# Patient Record
Sex: FEMALE | Race: BLACK OR AFRICAN AMERICAN | Employment: FULL TIME | URBAN - METROPOLITAN AREA
[De-identification: names, ages, dates, MRNs, and addresses within clinical notes are randomized per-mention and may not be internally consistent; named-entity substitution may affect disease eponyms.]

---

## 2017-01-01 ENCOUNTER — HOSPITAL ENCOUNTER (OUTPATIENT)
Dept: OTHER | Age: 47
Discharge: HOME | End: 2017-01-31
Attending: NEUROLOGICAL SURGERY | Admitting: NEUROLOGICAL SURGERY

## 2017-02-28 ENCOUNTER — OFFICE VISIT (OUTPATIENT)
Dept: FAMILY MEDICINE CLINIC | Age: 47
End: 2017-02-28

## 2017-02-28 VITALS
BODY MASS INDEX: 36.73 KG/M2 | SYSTOLIC BLOOD PRESSURE: 108 MMHG | HEART RATE: 74 BPM | WEIGHT: 248 LBS | HEIGHT: 69 IN | DIASTOLIC BLOOD PRESSURE: 64 MMHG

## 2017-02-28 DIAGNOSIS — J45.20 MILD INTERMITTENT ASTHMA WITHOUT COMPLICATION: ICD-10-CM

## 2017-02-28 DIAGNOSIS — R73.02 IMPAIRED GLUCOSE TOLERANCE: Primary | ICD-10-CM

## 2017-02-28 DIAGNOSIS — E66.01 SEVERE OBESITY (BMI 35.0-39.9): ICD-10-CM

## 2017-02-28 DIAGNOSIS — I10 ESSENTIAL HYPERTENSION: ICD-10-CM

## 2017-02-28 DIAGNOSIS — M79.672 LEFT FOOT PAIN: ICD-10-CM

## 2017-02-28 DIAGNOSIS — E78.49 OTHER HYPERLIPIDEMIA: ICD-10-CM

## 2017-02-28 LAB
A/G RATIO: 1.5 (ref 1.1–2.2)
ALBUMIN SERPL-MCNC: 4.6 G/DL (ref 3.4–5)
ALP BLD-CCNC: 66 U/L (ref 40–129)
ALT SERPL-CCNC: 18 U/L (ref 10–40)
ANION GAP SERPL CALCULATED.3IONS-SCNC: 17 MMOL/L (ref 3–16)
AST SERPL-CCNC: 17 U/L (ref 15–37)
BILIRUB SERPL-MCNC: 0.9 MG/DL (ref 0–1)
BUN BLDV-MCNC: 10 MG/DL (ref 7–20)
CALCIUM SERPL-MCNC: 9.8 MG/DL (ref 8.3–10.6)
CHLORIDE BLD-SCNC: 95 MMOL/L (ref 99–110)
CHOLESTEROL, TOTAL: 260 MG/DL (ref 0–199)
CO2: 24 MMOL/L (ref 21–32)
CREAT SERPL-MCNC: 0.6 MG/DL (ref 0.6–1.1)
GFR AFRICAN AMERICAN: >60
GFR NON-AFRICAN AMERICAN: >60
GLOBULIN: 3 G/DL
GLUCOSE BLD-MCNC: 102 MG/DL (ref 70–99)
HDLC SERPL-MCNC: 59 MG/DL (ref 40–60)
LDL CHOLESTEROL CALCULATED: 178 MG/DL
POTASSIUM SERPL-SCNC: 4.1 MMOL/L (ref 3.5–5.1)
SODIUM BLD-SCNC: 136 MMOL/L (ref 136–145)
TOTAL PROTEIN: 7.6 G/DL (ref 6.4–8.2)
TRIGL SERPL-MCNC: 114 MG/DL (ref 0–150)
VLDLC SERPL CALC-MCNC: 23 MG/DL

## 2017-02-28 PROCEDURE — 36415 COLL VENOUS BLD VENIPUNCTURE: CPT | Performed by: FAMILY MEDICINE

## 2017-02-28 PROCEDURE — 99214 OFFICE O/P EST MOD 30 MIN: CPT | Performed by: FAMILY MEDICINE

## 2017-02-28 RX ORDER — ATORVASTATIN CALCIUM 80 MG/1
80 TABLET, FILM COATED ORAL DAILY
Qty: 30 TABLET | Refills: 11 | Status: SHIPPED | OUTPATIENT
Start: 2017-02-28 | End: 2018-03-30 | Stop reason: SDUPTHER

## 2017-02-28 RX ORDER — METOPROLOL SUCCINATE 100 MG/1
100 TABLET, EXTENDED RELEASE ORAL DAILY
Qty: 30 TABLET | Refills: 5 | Status: SHIPPED | OUTPATIENT
Start: 2017-02-28 | End: 2018-04-25 | Stop reason: SDUPTHER

## 2017-02-28 RX ORDER — TRIAMTERENE AND HYDROCHLOROTHIAZIDE 37.5; 25 MG/1; MG/1
1 CAPSULE ORAL EVERY MORNING
Qty: 30 CAPSULE | Refills: 11 | Status: SHIPPED | OUTPATIENT
Start: 2017-02-28 | End: 2018-03-30 | Stop reason: SDUPTHER

## 2017-02-28 RX ORDER — GABAPENTIN 300 MG/1
300 CAPSULE ORAL NIGHTLY
Qty: 30 CAPSULE | Refills: 5 | Status: SHIPPED | OUTPATIENT
Start: 2017-02-28 | End: 2018-01-31

## 2017-02-28 RX ORDER — ALBUTEROL SULFATE 90 UG/1
2 AEROSOL, METERED RESPIRATORY (INHALATION) EVERY 6 HOURS PRN
Qty: 1 INHALER | Refills: 1 | Status: SHIPPED | OUTPATIENT
Start: 2017-02-28 | End: 2018-03-30 | Stop reason: SDUPTHER

## 2017-02-28 RX ORDER — SUMATRIPTAN 100 MG/1
1 TABLET, FILM COATED ORAL PRN
Refills: 11 | COMMUNITY
Start: 2017-02-07 | End: 2017-02-28

## 2017-02-28 ASSESSMENT — ENCOUNTER SYMPTOMS
CHEST TIGHTNESS: 0
SHORTNESS OF BREATH: 0

## 2017-03-01 LAB
ESTIMATED AVERAGE GLUCOSE: 125.5 MG/DL
HBA1C MFR BLD: 6 %

## 2017-03-06 ENCOUNTER — TELEPHONE (OUTPATIENT)
Dept: FAMILY MEDICINE CLINIC | Age: 47
End: 2017-03-06

## 2017-03-06 DIAGNOSIS — R73.02 IMPAIRED GLUCOSE TOLERANCE: Primary | ICD-10-CM

## 2017-05-10 ENCOUNTER — OFFICE VISIT (OUTPATIENT)
Dept: FAMILY MEDICINE CLINIC | Age: 47
End: 2017-05-10

## 2017-05-10 VITALS
WEIGHT: 252.2 LBS | HEART RATE: 78 BPM | DIASTOLIC BLOOD PRESSURE: 82 MMHG | BODY MASS INDEX: 37.36 KG/M2 | SYSTOLIC BLOOD PRESSURE: 118 MMHG | HEIGHT: 69 IN | TEMPERATURE: 98.5 F

## 2017-05-10 DIAGNOSIS — J06.9 VIRAL URI: Primary | ICD-10-CM

## 2017-05-10 PROCEDURE — 99213 OFFICE O/P EST LOW 20 MIN: CPT | Performed by: FAMILY MEDICINE

## 2017-05-10 RX ORDER — BENZONATATE 100 MG/1
200 CAPSULE ORAL 3 TIMES DAILY PRN
Qty: 30 CAPSULE | Refills: 0 | Status: SHIPPED | OUTPATIENT
Start: 2017-05-10 | End: 2017-09-05

## 2017-07-20 ENCOUNTER — OFFICE VISIT (OUTPATIENT)
Dept: FAMILY MEDICINE CLINIC | Age: 47
End: 2017-07-20

## 2017-07-20 VITALS
SYSTOLIC BLOOD PRESSURE: 108 MMHG | WEIGHT: 254.4 LBS | HEIGHT: 69 IN | BODY MASS INDEX: 37.68 KG/M2 | DIASTOLIC BLOOD PRESSURE: 62 MMHG | HEART RATE: 74 BPM

## 2017-07-20 DIAGNOSIS — N64.4 MASTODYNIA OF RIGHT BREAST: Primary | ICD-10-CM

## 2017-07-20 DIAGNOSIS — M77.11 RIGHT TENNIS ELBOW: ICD-10-CM

## 2017-07-20 DIAGNOSIS — Z85.3 HISTORY OF BREAST CANCER: ICD-10-CM

## 2017-07-20 PROCEDURE — 99213 OFFICE O/P EST LOW 20 MIN: CPT | Performed by: FAMILY MEDICINE

## 2017-08-15 ENCOUNTER — TELEPHONE (OUTPATIENT)
Dept: FAMILY MEDICINE CLINIC | Age: 47
End: 2017-08-15

## 2017-08-23 ENCOUNTER — HOSPITAL ENCOUNTER (OUTPATIENT)
Dept: WOMENS IMAGING | Age: 47
Discharge: HOME | End: 2017-08-23
Attending: FAMILY MEDICINE | Admitting: FAMILY MEDICINE

## 2017-08-23 DIAGNOSIS — N64.4 MASTODYNIA OF RIGHT BREAST: ICD-10-CM

## 2017-08-23 DIAGNOSIS — Z85.3 PERSONAL HISTORY OF MALIGNANT NEOPLASM OF BREAST: ICD-10-CM

## 2017-08-23 DIAGNOSIS — N64.4 BREAST PAIN, RIGHT: ICD-10-CM

## 2017-08-23 DIAGNOSIS — Z85.3 HISTORY OF BREAST CANCER: ICD-10-CM

## 2017-08-23 LAB
ALBUMIN SERPL-MCNC: 4.3 GM/DL (ref 3.4–5)
ALP BLD-CCNC: 80 IU/L (ref 40–128)
ALT SERPL-CCNC: 16 U/L (ref 10–40)
ANION GAP SERPL CALCULATED.3IONS-SCNC: 12 MMOL/L (ref 4–16)
AST SERPL-CCNC: 15 IU/L (ref 15–37)
BILIRUB SERPL-MCNC: 0.9 MG/DL (ref 0–1)
BUN BLDV-MCNC: 13 MG/DL (ref 6–23)
CALCIUM SERPL-MCNC: 9.5 MG/DL (ref 8.3–10.6)
CHLORIDE BLD-SCNC: 96 MMOL/L (ref 99–110)
CHOLESTEROL: 287 MG/DL
CO2: 30 MMOL/L (ref 21–32)
CREAT SERPL-MCNC: 0.8 MG/DL (ref 0.6–1.1)
ESTIMATED AVERAGE GLUCOSE: 117 MG/DL
GFR AFRICAN AMERICAN: >60 ML/MIN/1.73M2
GFR NON-AFRICAN AMERICAN: >60 ML/MIN/1.73M2
GLUCOSE FASTING: 92 MG/DL (ref 70–99)
HBA1C MFR BLD: 5.7 % (ref 4.2–6.3)
HDLC SERPL-MCNC: 50 MG/DL
LDL CHOLESTEROL DIRECT: 221 MG/DL
POTASSIUM SERPL-SCNC: 4 MMOL/L (ref 3.5–5.1)
SODIUM BLD-SCNC: 138 MMOL/L (ref 135–145)
TOTAL PROTEIN: 7.7 GM/DL (ref 6.4–8.2)
TRIGL SERPL-MCNC: 182 MG/DL

## 2017-08-30 ENCOUNTER — NURSE ONLY (OUTPATIENT)
Dept: FAMILY MEDICINE CLINIC | Age: 47
End: 2017-08-30

## 2017-08-30 DIAGNOSIS — Z23 NEED FOR INFLUENZA VACCINATION: Primary | ICD-10-CM

## 2017-08-30 PROCEDURE — 90471 IMMUNIZATION ADMIN: CPT | Performed by: FAMILY MEDICINE

## 2017-08-30 PROCEDURE — 90686 IIV4 VACC NO PRSV 0.5 ML IM: CPT | Performed by: FAMILY MEDICINE

## 2017-09-05 ENCOUNTER — OFFICE VISIT (OUTPATIENT)
Dept: FAMILY MEDICINE CLINIC | Age: 47
End: 2017-09-05

## 2017-09-05 VITALS
HEIGHT: 67 IN | RESPIRATION RATE: 14 BRPM | HEART RATE: 68 BPM | DIASTOLIC BLOOD PRESSURE: 72 MMHG | BODY MASS INDEX: 40.62 KG/M2 | WEIGHT: 258.8 LBS | SYSTOLIC BLOOD PRESSURE: 122 MMHG

## 2017-09-05 DIAGNOSIS — M77.11 RIGHT TENNIS ELBOW: ICD-10-CM

## 2017-09-05 DIAGNOSIS — E78.5 HYPERLIPIDEMIA, UNSPECIFIED HYPERLIPIDEMIA TYPE: ICD-10-CM

## 2017-09-05 DIAGNOSIS — G43.909 MIGRAINE WITHOUT STATUS MIGRAINOSUS, NOT INTRACTABLE, UNSPECIFIED MIGRAINE TYPE: ICD-10-CM

## 2017-09-05 DIAGNOSIS — I10 ESSENTIAL HYPERTENSION: ICD-10-CM

## 2017-09-05 DIAGNOSIS — Z00.00 ANNUAL PHYSICAL EXAM: Primary | ICD-10-CM

## 2017-09-05 DIAGNOSIS — J45.20 MILD INTERMITTENT ASTHMA WITHOUT COMPLICATION: ICD-10-CM

## 2017-09-05 DIAGNOSIS — F10.10 ALCOHOL ABUSE: ICD-10-CM

## 2017-09-05 DIAGNOSIS — R73.02 IMPAIRED GLUCOSE TOLERANCE: ICD-10-CM

## 2017-09-05 PROCEDURE — 99396 PREV VISIT EST AGE 40-64: CPT | Performed by: FAMILY MEDICINE

## 2017-09-05 PROCEDURE — 99214 OFFICE O/P EST MOD 30 MIN: CPT | Performed by: FAMILY MEDICINE

## 2017-09-05 RX ORDER — SUMATRIPTAN 100 MG/1
100 TABLET, FILM COATED ORAL
Qty: 9 TABLET | Refills: 11 | Status: SHIPPED | OUTPATIENT
Start: 2017-09-05 | End: 2018-04-25 | Stop reason: SDUPTHER

## 2017-10-12 ENCOUNTER — OFFICE VISIT (OUTPATIENT)
Dept: ORTHOPEDIC SURGERY | Age: 47
End: 2017-10-12

## 2017-10-12 VITALS — BODY MASS INDEX: 40.49 KG/M2 | RESPIRATION RATE: 16 BRPM | HEIGHT: 67 IN | WEIGHT: 258 LBS

## 2017-10-12 DIAGNOSIS — M77.11 LATERAL EPICONDYLITIS OF RIGHT ELBOW: ICD-10-CM

## 2017-10-12 DIAGNOSIS — R52 PAIN: Primary | ICD-10-CM

## 2017-10-12 PROCEDURE — 73080 X-RAY EXAM OF ELBOW: CPT | Performed by: ORTHOPAEDIC SURGERY

## 2017-10-12 PROCEDURE — 99244 OFF/OP CNSLTJ NEW/EST MOD 40: CPT | Performed by: ORTHOPAEDIC SURGERY

## 2017-10-12 PROCEDURE — 20551 NJX 1 TENDON ORIGIN/INSJ: CPT | Performed by: ORTHOPAEDIC SURGERY

## 2017-10-12 ASSESSMENT — ENCOUNTER SYMPTOMS
BACK PAIN: 1
COUGH: 1

## 2017-10-12 NOTE — LETTER
[facility]  [address]  [facility] [address]  Phone: [phone]  Fax: [phone]    Jessie Kaur MD        October 12, 2017     Patient: Neo Rahman   YOB: 1970   Date of Visit: 10/12/2017       To Whom it May Concern:    Emelyn Trevizo was seen in my clinic on 10/12/2017. If you have any questions or concerns, please don't hesitate to call.     Sincerely,           Jessie Kaur MD

## 2017-10-12 NOTE — PROGRESS NOTES
Review of Systems   Respiratory: Positive for cough. Musculoskeletal: Positive for back pain, myalgias and neck pain. Endo/Heme/Allergies: Positive for environmental allergies. All other systems reviewed and are negative. Scribe Authentication Statement  Sabina M[facility]dez [facility], scribed portions of this documentation for and in the presence of Dr. Baljinder Ellington on 10/12/17 at 4:02 PM.    Provider Ania Alvarez M.D., personally performed the services described in this documentation and they were scribed in my presence by the above listed scribe. The documentation is both accurate and complete. Ms. Osiel Cuevas c/o right elbow pain, pain is 3-4/10, lateral, aching. Dr. Benitez Joseph has treated her with tennis elbow brace, gabapentin, and inbuprofen. She is here today stating that the overall course is symptoms have progressed to a point and plateaued. 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 Referred by Dr. Benitez Joseph for right elbow pain    The patient's past medical history, medications, allergies,  family history, social history, and review of systems have been reviewed and are recorded in the chart. There are not any recent changes in their medical history. Physical Exam:  Vitals  Resp: 16  Height: 5' 7\" (170.2 cm)  Weight: 258 lb (117 kg)  Ms. Osiel Cuevas appears well, she is in no apparent distress, she demonstrates appropriate mood & affect. Gait is normal.    right Arm exam:  Skin: Skin color, texture, turgor normal. No rashes or lesions. Sensation is subjectively and objectively normal in the extremity. Vascular examination reveals 2+ radial.  Muscular strength is clinically appropriate bilaterally.     right elbow exam:  -Swelling is absent   -moderate tenderness to palpation of Her lateral epicondylar area, none medially, none posterio  -ROM is full range of motion  -there is no significant instability     taken and reviewed  3 views of the right elbow show no fracture, dislocation, swelling or degenerative changes noted      Impression:  right lateral epicondylitis    Plan:  Right elbow steroid injection complete today   NSAIDs as needed. Continue elbow strap as needed. Follow up as needed. Add Tylenol (also known as acetaminophen) as needed   Take 2 extra strength (500 mg) or 3 regular strength (325 mg) 3-4 times a day  It is OK to take Tylenol in conjunction with NSAID's (Advil, Aleve, Naprosyn, etc)  If taking other medications that have acetaminophen ensure total acetaminophen dose for any 24 period is less than 4,000 mg    The patient was advised that NSAID-type medications have two very important potential side effects: gastrointestinal irritation including hemorrhage and renal injuries. She was asked to take the medication with food and to stop if she experiences any GI upset. I asked her to call for vomiting, abdominal pain or black/bloody stools. The patient expresses understanding of these issues and questions were answered. right elbow lateral epicondyle injection procedure note    Procedure: The planned procedure/risks/benefits/alternatives were discussed with the patient. Risks include, but are not limited to, increased pain, drug reaction, infection, bleeding, lack of improvement, neurovascular injury, damage to tendons, and increased blood sugar levels. The patient understood and all of their questions were answered. The right elbow lateral epicondylar area was prepped with alcohol and betadine, then anesthetized with Ethyl Chloride spray. A 25 gauge needle was advanced into the area of maximal tenderness just distal to the lateral epicondyle without difficulty. The area was then injected with 1 ml 1% lidocaine, 1 ml 0.5% bupivicaine and 1 ml of depo-medrol 80mg/ml. The injection site was cleansed with isopropyl alcohol and a band-aid was placed. Complications:  None, the patient tolerated the procedure well. Instructions: The patient was advised to rest the elbow and decrease activity for the next 24 to 48 hours. May use prescription or OTC pain relievers as needed for any post-injection pain as well as ice. We greatly appreciate the referral from Dr. Chris Mario, and will send a copy of this note to her office.

## 2017-10-12 NOTE — PATIENT INSTRUCTIONS
Right elbow steroid injection   Tylenol  NSAIDs  Follow up as needed     Add Tylenol (also known as acetaminophen) as needed   Take 2 extra strength (500 mg) or 3 regular strength (325 mg) 3-4 times a day  It is OK to take Tylenol in conjunction with NSAID's (Advil, Aleve, Naprosyn, etc)  If taking other medications that have acetaminophen ensure total acetaminophen dose for any 24 period is less than 4,000 mg     The patient was advised that NSAID-type medications have two very important potential side effects: gastrointestinal irritation including hemorrhage and renal injuries. She was asked to take the medication with food and to stop if she experiences any GI upset. I asked her to call for vomiting, abdominal pain or black/bloody stools.  The patient expresses understanding of these issues and questions were answered.

## 2017-10-17 ENCOUNTER — OFFICE VISIT (OUTPATIENT)
Dept: FAMILY MEDICINE CLINIC | Age: 47
End: 2017-10-17

## 2017-10-17 VITALS
HEIGHT: 67 IN | DIASTOLIC BLOOD PRESSURE: 68 MMHG | WEIGHT: 257.2 LBS | SYSTOLIC BLOOD PRESSURE: 110 MMHG | HEART RATE: 69 BPM | BODY MASS INDEX: 40.37 KG/M2

## 2017-10-17 DIAGNOSIS — F43.21 GRIEF: ICD-10-CM

## 2017-10-17 DIAGNOSIS — R05.9 COUGH: ICD-10-CM

## 2017-10-17 DIAGNOSIS — E78.5 HYPERLIPIDEMIA, UNSPECIFIED HYPERLIPIDEMIA TYPE: Primary | ICD-10-CM

## 2017-10-17 LAB
CHOLESTEROL, TOTAL: 231 MG/DL (ref 0–199)
HDLC SERPL-MCNC: 55 MG/DL (ref 40–60)
LDL CHOLESTEROL CALCULATED: 133 MG/DL
TRIGL SERPL-MCNC: 216 MG/DL (ref 0–150)
VLDLC SERPL CALC-MCNC: 43 MG/DL

## 2017-10-17 PROCEDURE — 99213 OFFICE O/P EST LOW 20 MIN: CPT | Performed by: FAMILY MEDICINE

## 2017-10-17 PROCEDURE — 36415 COLL VENOUS BLD VENIPUNCTURE: CPT | Performed by: FAMILY MEDICINE

## 2017-10-17 ASSESSMENT — ENCOUNTER SYMPTOMS: COUGH: 1

## 2017-10-17 NOTE — PATIENT INSTRUCTIONS
Patient Education        Learning About Sleeping Well  What does sleeping well mean? Sleeping well means getting enough sleep. How much sleep is enough varies among people. The number of hours you sleep is not as important as how you feel when you wake up. If you do not feel refreshed, you probably need more sleep. Another sign of not getting enough sleep is feeling tired during the day. The average total nightly sleep time is 7½ to 8 hours. Healthy adults may need a little more or a little less than this. Why is getting enough sleep important? Getting enough quality sleep is a basic part of good health. When your sleep suffers, your mood and your thoughts can suffer too. You may find yourself feeling more grumpy or stressed. Not getting enough sleep also can lead to serious problems, including injury, accidents, anxiety, and depression. What might cause poor sleeping? Many things can cause sleep problems, including:  · Stress. Stress can be caused by fear about a single event, such as giving a speech. Or you may have ongoing stress, such as worry about work or school. · Depression, anxiety, and other mental or emotional conditions. · Changes in your sleep habits or surroundings. This includes changes that happen where you sleep, such as noise, light, or sleeping in a different bed. It also includes changes in your sleep pattern, such as having jet lag or working a late shift. · Health problems, such as pain, breathing problems, and restless legs syndrome. · Lack of regular exercise. How can you help yourself? Here are some tips that may help you sleep more soundly and wake up feeling more refreshed. Your sleeping area  · Use your bedroom only for sleeping and sex. A bit of light reading may help you fall asleep. But if it doesn't, do your reading elsewhere in the house. Don't watch TV in bed.   · Be sure your bed is big enough to stretch out comfortably, especially if you have a sleep

## 2017-10-17 NOTE — PROGRESS NOTES
Subjective:      Patient ID: Shilpa Guzman is a 49 y.o. female. Hyperlipidemia   This is a chronic (She did not go get her labs done for today's appointment. She is fasting today.) problem. The current episode started more than 1 year ago. The problem is uncontrolled. Recent lipid tests were reviewed and are high. Exacerbating diseases include obesity. The current treatment provides no improvement of lipids. Compliance problems include psychosocial issues. Risk factors for coronary artery disease include obesity. Asthma   She complains of cough. This is a chronic problem. The problem occurs daily. Progression since onset: her pharmacy did not give her the QVAR and this is why she is coughing. Her past medical history is significant for asthma. Past Medical History:   Diagnosis Date    Asthma     Cancer [facility])     right breast    History of breast cancer     HTN (hypertension) 2013    Hyperlipidemia 10/2012     Past Surgical History:   Procedure Laterality Date    BREAST BIOPSY      COLONOSCOPY      polyp. Repeat in     COLONOSCOPY  2016    diverticulosis    HYSTERECTOMY      Cysts, Endometriosis. 1 ovary remains    TONSILLECTOMY AND ADENOIDECTOMY      TUBAL LIGATION       Patient Active Problem List   Diagnosis    History of breast cancer    Hyperlipidemia    Headache    Asthma    Impaired glucose tolerance    Essential hypertension    Severe obesity (BMI 35.0-39.9) (HCC)    Left foot pain    Alcohol abuse    Cellulitis and abscess of trunk         Review of Systems   Respiratory: Positive for cough. Psychiatric/Behavioral: Positive for behavioral problems, dysphoric mood and sleep disturbance. Grief: Mother just  10 weeks ago from a stroke. Patient is having a hard time sleeping. She does not have any support at home from her . Her kids are supportive. She does not want any medication at this time.     Objective:   Physical Exam Constitutional: She appears well-developed and well-nourished. Obese   HENT:   Head: Normocephalic and atraumatic. Neck: Neck supple. Cardiovascular: Normal rate, regular rhythm and normal heart sounds. Pulmonary/Chest: Effort normal and breath sounds normal. No respiratory distress. She has no wheezes. Neurological: She is alert. She displays no tremor. Moving all extremities. No facial droop   Psychiatric: She has a normal mood and affect. Her speech is normal and behavior is normal. Judgment and thought content normal.   Tearful      Vitals:    10/17/17 1418   BP: 110/68   Site: Right Arm   Position: Sitting   Cuff Size: Large Adult   Pulse: 69   Weight: 257 lb 3.2 oz (116.7 kg)   Height: 5' 7\" (1.702 m)     Body mass index is 40.28 kg/m².      Wt Readings from Last 3 Encounters:   10/17/17 257 lb 3.2 oz (116.7 kg)   10/12/17 258 lb (117 kg)   09/05/17 258 lb 12.8 oz (117.4 kg)     BP Readings from Last 3 Encounters:   10/17/17 110/68   09/05/17 122/72   09/03/17 106/77      The 10-year ASCVD risk score (James Gauthier et al., 2013) is: 2.5%    Values used to calculate the score:      Age: 49 years      Sex: Female      Is Non- : Yes      Diabetic: No      Tobacco smoker: No      Systolic Blood Pressure: 758 mmHg      Is BP treated: Yes      HDL Cholesterol: 50 MG/DL      Total Cholesterol: 287 MG/DL    Lab Review   Admission on 09/03/2017, Discharged on 09/03/2017   Component Date Value    Ventricular Rate 09/07/2017 78     Atrial Rate 09/07/2017 78     P-R Interval 09/07/2017 148     QRS Duration 09/07/2017 82     Q-T Interval 09/07/2017 392     QTc Calculation (Bazett) 09/07/2017 446     P Axis 09/07/2017 71     R Axis 09/07/2017 54     T Axis 09/07/2017 48     Diagnosis 09/07/2017                      Value:Normal sinus rhythm  Normal ECG  No previous ECGs available  Confirmed by [facility] DOREEN MOBLEY ([id number]) on 9/4/2017 6:19:28 PM      WBC 09/03/2017 9.1     RBC 08/23/2017 16     AST 08/23/2017 15     Alkaline Phosphatase 08/23/2017 80     GFR Non- 08/23/2017 >60     GFR  08/23/2017 >60     Anion Gap 08/23/2017 12     Triglycerides 08/23/2017 182*    Cholesterol 08/23/2017 287*    HDL 08/23/2017 50     LDL Direct 08/23/2017 221*    Hemoglobin A1C 08/23/2017 5.7     eAG 08/23/2017 117        Assessment:      1. Hyperlipidemia, unspecified hyperlipidemia type  LIPID PANEL   2. Grief     3. Cough             Plan:      Sounds like her cough is asthma related. Pharmacy should be able to give her the refills of her Qvar.     Sorry For the loss of her mother

## 2017-10-18 ENCOUNTER — TELEPHONE (OUTPATIENT)
Dept: FAMILY MEDICINE CLINIC | Age: 47
End: 2017-10-18

## 2017-10-18 ENCOUNTER — PATIENT MESSAGE (OUTPATIENT)
Dept: FAMILY MEDICINE CLINIC | Age: 47
End: 2017-10-18

## 2017-10-18 DIAGNOSIS — J45.909 UNCOMPLICATED ASTHMA, UNSPECIFIED ASTHMA SEVERITY, UNSPECIFIED WHETHER PERSISTENT: Primary | ICD-10-CM

## 2017-10-18 NOTE — TELEPHONE ENCOUNTER
Patient went to the pharmacy to get QVAR refill and pharmacy said since she never got the February script filled it is .  Patient needs new script sent to [facility]

## 2018-01-31 ENCOUNTER — OFFICE VISIT (OUTPATIENT)
Dept: FAMILY MEDICINE CLINIC | Age: 48
End: 2018-01-31

## 2018-01-31 VITALS
HEIGHT: 67 IN | HEART RATE: 68 BPM | WEIGHT: 255.8 LBS | SYSTOLIC BLOOD PRESSURE: 124 MMHG | BODY MASS INDEX: 40.15 KG/M2 | DIASTOLIC BLOOD PRESSURE: 70 MMHG

## 2018-01-31 DIAGNOSIS — N89.8 VAGINAL DISCHARGE: Primary | ICD-10-CM

## 2018-01-31 PROCEDURE — 99213 OFFICE O/P EST LOW 20 MIN: CPT | Performed by: NURSE PRACTITIONER

## 2018-01-31 ASSESSMENT — PATIENT HEALTH QUESTIONNAIRE - PHQ9
SUM OF ALL RESPONSES TO PHQ9 QUESTIONS 1 & 2: 0
SUM OF ALL RESPONSES TO PHQ QUESTIONS 1-9: 0
1. LITTLE INTEREST OR PLEASURE IN DOING THINGS: 0
2. FEELING DOWN, DEPRESSED OR HOPELESS: 0

## 2018-01-31 ASSESSMENT — ENCOUNTER SYMPTOMS
SHORTNESS OF BREATH: 0
WHEEZING: 0
COUGH: 1

## 2018-01-31 NOTE — PROGRESS NOTES
Nel Vasquez  1970  49 y.o. SUBJECT MARILEE:    Chief Complaint   Patient presents with    Vaginal Discharge     x 1 week, yellow in color, patient denies odor. Denies any new partners. 49year old female presents for aforementioned complaints which began about a week ago after using baby powder for vaginal sweating      Vaginal Discharge   The patient's primary symptoms include vaginal discharge. The patient's pertinent negatives include no genital itching, genital lesions, genital odor, genital rash or pelvic pain. This is a new problem. The current episode started in the past 7 days. The problem occurs intermittently. The problem has been gradually worsening. The patient is experiencing no pain. She is not pregnant (s/p hysterectomy). Pertinent negatives include no dysuria, fever, frequency, headaches, hematuria or urgency. There has been no bleeding. Nothing aggravates the symptoms. She has tried nothing for the symptoms. She is not sexually active. No, her partner does not have an STD. She uses hysterectomy for contraception. Her past medical history is significant for vaginosis (about 3 years ago). There is no history of an STD.        Past Medical History:   Diagnosis Date    Asthma     Cancer [facility])     right breast    History of breast cancer 2009    HTN (hypertension) 9/18/2013    Hyperlipidemia 10/2012    Migraines        Current Outpatient Prescriptions on File Prior to Visit   Medication Sig Dispense Refill    SUMAtriptan (IMITREX) 100 MG tablet Take 1 tablet by mouth once as needed for Migraine 9 tablet 11    albuterol-ipratropium (COMBIVENT RESPIMAT)  MCG/ACT AERS inhaler Inhale 1 puff into the lungs every 6 hours 1 Inhaler 1    Elastic Bandages & Supports (TENNIS ELBOW NEOPRENE BRACE) MISC Wear daily (Patient taking differently: Wear daily) 1 each 0    atorvastatin (LIPITOR) 80 MG tablet Take 1 tablet by mouth daily 30 tablet 11    beclomethasone (QVAR) 80 MCG/ACT inhaler

## 2018-02-02 DIAGNOSIS — A59.01 TRICHOMONAL VAGINITIS: ICD-10-CM

## 2018-02-02 DIAGNOSIS — N76.0 BV (BACTERIAL VAGINOSIS): Primary | ICD-10-CM

## 2018-02-02 DIAGNOSIS — B96.89 BV (BACTERIAL VAGINOSIS): Primary | ICD-10-CM

## 2018-02-02 LAB
CANDIDA SPECIES, DNA PROBE: ABNORMAL
GARDNERELLA VAGINALIS, DNA PROBE: ABNORMAL
TRICHOMONAS VAGINALIS DNA: ABNORMAL

## 2018-02-02 RX ORDER — METRONIDAZOLE 500 MG/1
500 TABLET ORAL 2 TIMES DAILY
Qty: 14 TABLET | Refills: 0 | Status: SHIPPED | OUTPATIENT
Start: 2018-02-02 | End: 2018-02-09

## 2018-02-20 ENCOUNTER — OFFICE VISIT (OUTPATIENT)
Dept: ORTHOPEDIC SURGERY | Age: 48
End: 2018-02-20

## 2018-02-20 VITALS — RESPIRATION RATE: 16 BRPM | WEIGHT: 255 LBS | HEIGHT: 67 IN | BODY MASS INDEX: 40.02 KG/M2

## 2018-02-20 DIAGNOSIS — S76.312A HAMSTRING MUSCLE STRAIN, LEFT, INITIAL ENCOUNTER: Primary | ICD-10-CM

## 2018-02-20 DIAGNOSIS — R52 PAIN: ICD-10-CM

## 2018-02-20 DIAGNOSIS — M17.12 PRIMARY OSTEOARTHRITIS OF LEFT KNEE: ICD-10-CM

## 2018-02-20 PROCEDURE — 99215 OFFICE O/P EST HI 40 MIN: CPT | Performed by: ORTHOPAEDIC SURGERY

## 2018-02-20 RX ORDER — GABAPENTIN 300 MG/1
CAPSULE ORAL NIGHTLY
COMMUNITY
Start: 2018-02-19 | End: 2018-09-10

## 2018-02-20 RX ORDER — METRONIDAZOLE 500 MG/1
TABLET ORAL
Refills: 0 | COMMUNITY
Start: 2018-02-02 | End: 2018-04-25

## 2018-02-20 ASSESSMENT — ENCOUNTER SYMPTOMS
SHORTNESS OF BREATH: 1
COUGH: 1
GASTROINTESTINAL NEGATIVE: 1
BACK PAIN: 1
EYES NEGATIVE: 1

## 2018-02-20 NOTE — PROGRESS NOTES
Scribe Authentication Statement  Natacha Augustine, scribed portions of this documentation for and in the presence of Dr. Tristin Ahn on 2/20/18 at 4:06 PM.    Provider Ellen Nava M.D., personally performed the services described in this documentation and they were scribed in my presence by the above listed scribe. The documentation is both accurate and complete. Review of Systems   Constitutional: Negative. HENT: Negative. Eyes: Negative. Respiratory: Positive for cough and shortness of breath. Cardiovascular: Positive for palpitations and leg swelling. Gastrointestinal: Negative. Genitourinary: Negative. Musculoskeletal: Positive for back pain and joint pain. Skin: Negative. Neurological: Negative. Endo/Heme/Allergies: Positive for environmental allergies. Psychiatric/Behavioral: Positive for depression. HPI:  Iván Fernandez is a 49y.o. year old female who complains of Left knee pain and swelling. The Left knee pain assessment is:   Intensity: 5/10   Location:        Posterior, global   Description:    aching, pressure, sharp and throbbing    The symptoms started 4-5 months ago  Cause of injury was  Unknown, was sitting and stood up to walk, felt sharp pain in posterior knee. Previous treatment for this episode has included none. Symptoms improve with avoiding painful activities. The symptoms are worse with sitting still, standing, beginning of activity, stairs. The progression of symptoms, overall course: waxing and waning but worse overall.  Prior to this episode, knee history is: negative for prior surgery, trauma, arthritis or disorders      Past Medical History:   Diagnosis Date    Asthma     Cancer ([facility] [address]     right breast    History of breast cancer 2009    HTN (hypertension) 9/18/2013    Hyperlipidemia 10/2012    Migraines        Past Surgical History:   Procedure Laterality Date    BREAST BIOPSY  2009   

## 2018-03-01 ENCOUNTER — HOSPITAL ENCOUNTER (OUTPATIENT)
Dept: PHYSICAL THERAPY | Age: 48
Discharge: HOME | End: 2018-03-31
Attending: ORTHOPAEDIC SURGERY | Admitting: ORTHOPAEDIC SURGERY

## 2018-03-01 ASSESSMENT — PAIN SCALES - GENERAL: PAINLEVEL_OUTOF10: 5

## 2018-03-01 ASSESSMENT — PAIN DESCRIPTION - LOCATION: LOCATION: KNEE

## 2018-03-01 ASSESSMENT — PAIN DESCRIPTION - FREQUENCY: FREQUENCY: INTERMITTENT

## 2018-03-01 ASSESSMENT — PAIN DESCRIPTION - PAIN TYPE: TYPE: CHRONIC PAIN

## 2018-03-01 ASSESSMENT — PAIN DESCRIPTION - ONSET: ONSET: ON-GOING

## 2018-03-01 ASSESSMENT — PAIN DESCRIPTION - ORIENTATION: ORIENTATION: LEFT

## 2018-03-01 ASSESSMENT — PAIN DESCRIPTION - DESCRIPTORS: DESCRIPTORS: SHARP;ACHING

## 2018-03-30 ENCOUNTER — TELEPHONE (OUTPATIENT)
Dept: FAMILY MEDICINE CLINIC | Age: 48
End: 2018-03-30

## 2018-03-30 DIAGNOSIS — I10 ESSENTIAL HYPERTENSION: ICD-10-CM

## 2018-03-30 DIAGNOSIS — J45.20 MILD INTERMITTENT ASTHMA WITHOUT COMPLICATION: ICD-10-CM

## 2018-03-30 RX ORDER — TRIAMTERENE AND HYDROCHLOROTHIAZIDE 37.5; 25 MG/1; MG/1
1 CAPSULE ORAL EVERY MORNING
Qty: 30 CAPSULE | Refills: 0 | Status: SHIPPED | OUTPATIENT
Start: 2018-03-30 | End: 2018-06-01 | Stop reason: SDUPTHER

## 2018-03-30 RX ORDER — ATORVASTATIN CALCIUM 80 MG/1
80 TABLET, FILM COATED ORAL DAILY
Qty: 30 TABLET | Refills: 0 | Status: SHIPPED | OUTPATIENT
Start: 2018-03-30 | End: 2018-06-01 | Stop reason: SDUPTHER

## 2018-03-30 RX ORDER — ALBUTEROL SULFATE 90 UG/1
2 AEROSOL, METERED RESPIRATORY (INHALATION) EVERY 6 HOURS PRN
Qty: 1 INHALER | Refills: 0 | Status: SHIPPED | OUTPATIENT
Start: 2018-03-30 | End: 2018-12-11 | Stop reason: SDUPTHER

## 2018-04-01 ENCOUNTER — HOSPITAL ENCOUNTER (OUTPATIENT)
Dept: OTHER | Age: 48
Discharge: HOME | End: 2018-04-30
Attending: ORTHOPAEDIC SURGERY | Admitting: ORTHOPAEDIC SURGERY

## 2018-04-14 ENCOUNTER — HOSPITAL ENCOUNTER (OUTPATIENT)
Dept: PHYSICAL THERAPY | Age: 48
Discharge: HOME | End: 2018-04-14
Admitting: ORTHOPAEDIC SURGERY

## 2018-04-19 ENCOUNTER — TELEPHONE (OUTPATIENT)
Dept: FAMILY MEDICINE CLINIC | Age: 48
End: 2018-04-19

## 2018-04-19 DIAGNOSIS — I10 ESSENTIAL HYPERTENSION: Primary | ICD-10-CM

## 2018-04-19 RX ORDER — METOPROLOL SUCCINATE 100 MG/1
100 TABLET, EXTENDED RELEASE ORAL DAILY
Qty: 30 TABLET | Refills: 0 | Status: SHIPPED | OUTPATIENT
Start: 2018-04-19 | End: 2018-04-25

## 2018-04-25 ENCOUNTER — OFFICE VISIT (OUTPATIENT)
Dept: FAMILY MEDICINE CLINIC | Age: 48
End: 2018-04-25

## 2018-04-25 VITALS
HEIGHT: 67 IN | BODY MASS INDEX: 41.97 KG/M2 | DIASTOLIC BLOOD PRESSURE: 70 MMHG | SYSTOLIC BLOOD PRESSURE: 102 MMHG | WEIGHT: 267.4 LBS | HEART RATE: 74 BPM

## 2018-04-25 DIAGNOSIS — R73.02 IMPAIRED GLUCOSE TOLERANCE: ICD-10-CM

## 2018-04-25 DIAGNOSIS — E66.01 SEVERE OBESITY (BMI 35.0-39.9): ICD-10-CM

## 2018-04-25 DIAGNOSIS — E78.5 HYPERLIPIDEMIA, UNSPECIFIED HYPERLIPIDEMIA TYPE: ICD-10-CM

## 2018-04-25 DIAGNOSIS — G43.909 MIGRAINE WITHOUT STATUS MIGRAINOSUS, NOT INTRACTABLE, UNSPECIFIED MIGRAINE TYPE: ICD-10-CM

## 2018-04-25 DIAGNOSIS — E66.01 OBESITY, CLASS III, BMI 40-49.9 (MORBID OBESITY) (HCC): ICD-10-CM

## 2018-04-25 DIAGNOSIS — I10 ESSENTIAL HYPERTENSION: Primary | ICD-10-CM

## 2018-04-25 DIAGNOSIS — F32.2 SEVERE SINGLE CURRENT EPISODE OF MAJOR DEPRESSIVE DISORDER, WITHOUT PSYCHOTIC FEATURES (HCC): ICD-10-CM

## 2018-04-25 DIAGNOSIS — F43.21 GRIEF: ICD-10-CM

## 2018-04-25 PROBLEM — E66.813 OBESITY, CLASS III, BMI 40-49.9 (MORBID OBESITY) (HCC): Status: ACTIVE | Noted: 2018-04-25

## 2018-04-25 PROBLEM — F10.10 ALCOHOL ABUSE: Status: RESOLVED | Noted: 2017-09-05 | Resolved: 2018-04-25

## 2018-04-25 LAB
CHOLESTEROL, TOTAL: 267 MG/DL (ref 0–199)
HDLC SERPL-MCNC: 47 MG/DL (ref 40–60)
LDL CHOLESTEROL CALCULATED: 179 MG/DL
TRIGL SERPL-MCNC: 204 MG/DL (ref 0–150)
VLDLC SERPL CALC-MCNC: 41 MG/DL

## 2018-04-25 PROCEDURE — 99214 OFFICE O/P EST MOD 30 MIN: CPT | Performed by: FAMILY MEDICINE

## 2018-04-25 PROCEDURE — 36415 COLL VENOUS BLD VENIPUNCTURE: CPT | Performed by: FAMILY MEDICINE

## 2018-04-25 RX ORDER — METOPROLOL SUCCINATE 100 MG/1
100 TABLET, EXTENDED RELEASE ORAL DAILY
Qty: 30 TABLET | Refills: 5 | Status: SHIPPED | OUTPATIENT
Start: 2018-04-25 | End: 2018-12-11 | Stop reason: SDUPTHER

## 2018-04-25 RX ORDER — SUMATRIPTAN 100 MG/1
100 TABLET, FILM COATED ORAL
Qty: 9 TABLET | Refills: 11 | Status: SHIPPED | OUTPATIENT
Start: 2018-04-25 | End: 2018-09-05

## 2018-04-25 ASSESSMENT — ENCOUNTER SYMPTOMS: SHORTNESS OF BREATH: 0

## 2018-04-26 ENCOUNTER — TELEPHONE (OUTPATIENT)
Dept: FAMILY MEDICINE CLINIC | Age: 48
End: 2018-04-26

## 2018-04-26 ENCOUNTER — OFFICE VISIT (OUTPATIENT)
Dept: ORTHOPEDIC SURGERY | Age: 48
End: 2018-04-26

## 2018-04-26 VITALS — WEIGHT: 267 LBS | RESPIRATION RATE: 16 BRPM | HEIGHT: 67 IN | BODY MASS INDEX: 41.91 KG/M2

## 2018-04-26 DIAGNOSIS — M77.11 LATERAL EPICONDYLITIS OF RIGHT ELBOW: Primary | ICD-10-CM

## 2018-04-26 LAB
ESTIMATED AVERAGE GLUCOSE: 131.2 MG/DL
HBA1C MFR BLD: 6.2 %

## 2018-04-26 PROCEDURE — 20550 NJX 1 TENDON SHEATH/LIGAMENT: CPT | Performed by: ORTHOPAEDIC SURGERY

## 2018-04-26 PROCEDURE — 99213 OFFICE O/P EST LOW 20 MIN: CPT | Performed by: ORTHOPAEDIC SURGERY

## 2018-04-26 RX ORDER — BECLOMETHASONE DIPROPIONATE HFA 80 UG/1
AEROSOL, METERED RESPIRATORY (INHALATION)
Refills: 0 | COMMUNITY
Start: 2018-04-20 | End: 2018-12-11 | Stop reason: SDUPTHER

## 2018-04-26 ASSESSMENT — ENCOUNTER SYMPTOMS
EYES NEGATIVE: 1
GASTROINTESTINAL NEGATIVE: 1
COUGH: 1
BACK PAIN: 1

## 2018-05-01 ENCOUNTER — HOSPITAL ENCOUNTER (OUTPATIENT)
Dept: OTHER | Age: 48
Discharge: HOME | End: 2018-05-31
Attending: ORTHOPAEDIC SURGERY | Admitting: ORTHOPAEDIC SURGERY

## 2018-05-14 ENCOUNTER — OFFICE VISIT (OUTPATIENT)
Dept: FAMILY MEDICINE CLINIC | Age: 48
End: 2018-05-14

## 2018-05-14 VITALS
SYSTOLIC BLOOD PRESSURE: 120 MMHG | BODY MASS INDEX: 41.4 KG/M2 | HEIGHT: 67 IN | WEIGHT: 263.8 LBS | DIASTOLIC BLOOD PRESSURE: 70 MMHG | HEART RATE: 57 BPM

## 2018-05-14 DIAGNOSIS — F33.41 RECURRENT MAJOR DEPRESSIVE DISORDER, IN PARTIAL REMISSION (HCC): Primary | ICD-10-CM

## 2018-05-14 DIAGNOSIS — E78.5 HYPERLIPIDEMIA, UNSPECIFIED HYPERLIPIDEMIA TYPE: ICD-10-CM

## 2018-05-14 DIAGNOSIS — R73.02 IMPAIRED GLUCOSE TOLERANCE: ICD-10-CM

## 2018-05-14 PROCEDURE — 99213 OFFICE O/P EST LOW 20 MIN: CPT | Performed by: FAMILY MEDICINE

## 2018-05-14 RX ORDER — SUMATRIPTAN 100 MG/1
100 TABLET, FILM COATED ORAL
COMMUNITY
End: 2019-03-11

## 2018-06-01 DIAGNOSIS — I10 ESSENTIAL HYPERTENSION: ICD-10-CM

## 2018-06-01 DIAGNOSIS — E78.5 HYPERLIPIDEMIA, UNSPECIFIED HYPERLIPIDEMIA TYPE: Primary | ICD-10-CM

## 2018-06-01 RX ORDER — TRIAMTERENE AND HYDROCHLOROTHIAZIDE 37.5; 25 MG/1; MG/1
1 CAPSULE ORAL EVERY MORNING
Qty: 30 CAPSULE | Refills: 5 | Status: SHIPPED | OUTPATIENT
Start: 2018-06-01 | End: 2018-12-11 | Stop reason: SDUPTHER

## 2018-06-01 RX ORDER — ATORVASTATIN CALCIUM 80 MG/1
80 TABLET, FILM COATED ORAL DAILY
Qty: 30 TABLET | Refills: 5 | Status: SHIPPED | OUTPATIENT
Start: 2018-06-01 | End: 2018-12-11 | Stop reason: SDUPTHER

## 2018-08-09 ENCOUNTER — OFFICE VISIT (OUTPATIENT)
Dept: ORTHOPEDIC SURGERY | Age: 48
End: 2018-08-09

## 2018-08-09 VITALS — HEIGHT: 67 IN | BODY MASS INDEX: 41.12 KG/M2 | WEIGHT: 262 LBS | RESPIRATION RATE: 14 BRPM

## 2018-08-09 DIAGNOSIS — M17.12 PRIMARY OSTEOARTHRITIS OF LEFT KNEE: ICD-10-CM

## 2018-08-09 DIAGNOSIS — S83.282A TEAR OF LATERAL MENISCUS OF LEFT KNEE, CURRENT, UNSPECIFIED TEAR TYPE, INITIAL ENCOUNTER: Primary | ICD-10-CM

## 2018-08-09 PROCEDURE — 99213 OFFICE O/P EST LOW 20 MIN: CPT | Performed by: PHYSICIAN ASSISTANT

## 2018-08-09 ASSESSMENT — ENCOUNTER SYMPTOMS
COUGH: 1
EYES NEGATIVE: 1
SHORTNESS OF BREATH: 1
BACK PAIN: 1
GASTROINTESTINAL NEGATIVE: 1

## 2018-08-09 NOTE — PROGRESS NOTES
inhaler Inhale 2 puffs into the lungs 2 times daily 1 Inhaler 0    albuterol sulfate  (90 Base) MCG/ACT inhaler Inhale 2 puffs into the lungs every 6 hours as needed for Wheezing or Shortness of Breath 1 Inhaler 0    gabapentin (NEURONTIN) 300 MG capsule nightly. Julianna Shen albuterol-ipratropium (COMBIVENT RESPIMAT)  MCG/ACT AERS inhaler Inhale 1 puff into the lungs every 6 hours 1 Inhaler 1    Elastic Bandages & Supports (TENNIS ELBOW NEOPRENE BRACE) MISC Wear daily (Patient taking differently: Wear daily) 1 each 0     Current Facility-Administered Medications   Medication Dose Route Frequency Provider Last Rate Last Dose    promethazine (PHENERGAN) injection 25 mg  25 mg Intramuscular Q6H PRN Marisa Soria MD   25 mg at 03/25/14 1541       No Known Allergies    Review of Systems:  See above      Physical Exam:   Resp 14   Ht 5' 7\" (1.702 m)   Wt 262 lb (118.8 kg)   LMP  (Exact Date)   BMI 41.04 kg/m²        Gait is trace Antalgic. The patient can bear weight on the injured extremity. Gen/Psych: Examination reveals a pleasant individual in no acute distress. The patient is oriented to time, place and person. The patient's mood and affect are appropriate.     Lymph: The lymphatic examination bilaterally reveals all areas to be without enlargement or induration.      Skin intact without lymphadenopathy, discoloration, or abnormal temperature.      Vascular: There is intact, symmetric circulation in both lower extremities.     left leg/knee exam:  Leg alignment:     neutral  Quadriceps/hamstring atrophy:   none  Knee effusion:    none   Knee erythema:   no  ROM:     0-140  Lachman:    no  Pivot Shift:    no  Posterior drawer:   no  Lateral patella glide at 30 deg's: 15%       With no apprehension  Medial patella glide at 30 deg's: 5mm  Increased ER at 30 deg's:  no  Varus laxity at 0 and 30 deg's: no  Valgus laxity at 0 and 30 deg's: no  Recurvatum:    no  Tenderness at:   Lateral joint line with a positive lateral Payam's     Knee strength is 5/5 flexion and extension  There is + L2-S1 motor and sensory function. Reviewed left knee x-rays 4 views done previously which showed some mild osteoarthritis but no significant joint space narrowing. Impression:  Left lateral meniscus tear (most likely) v. Early arthritis of the lateral compartment      Plan:  Natural history and expected course discussed. Questions answered. Given the persistent sharp lateral joint line pain that she experiences I feel that an MRI is necessary to rule out a lateral meniscus tear. I talked with patient about treatment plans and just give this more time versus ordering an MRI to look for possible lateral meniscus tear in the patient would prefer going forward with the MRI to  Evaluate for a lateral meniscus tear  MRI left knee   Follow up once MRI is complete    Central scheduling [phone]    The patient was seen and evaluated with KELLY Wilson.

## 2018-08-30 ENCOUNTER — HOSPITAL ENCOUNTER (OUTPATIENT)
Dept: MRI IMAGING | Age: 48
Discharge: HOME | End: 2018-08-30
Attending: PHYSICIAN ASSISTANT | Admitting: PHYSICIAN ASSISTANT

## 2018-08-30 DIAGNOSIS — S83.282A TEAR OF LATERAL MENISCUS OF LEFT KNEE, CURRENT, UNSPECIFIED TEAR TYPE, INITIAL ENCOUNTER: ICD-10-CM

## 2018-09-05 ENCOUNTER — OFFICE VISIT (OUTPATIENT)
Dept: FAMILY MEDICINE CLINIC | Age: 48
End: 2018-09-05

## 2018-09-05 VITALS
BODY MASS INDEX: 40.77 KG/M2 | HEART RATE: 68 BPM | WEIGHT: 269 LBS | HEIGHT: 68 IN | DIASTOLIC BLOOD PRESSURE: 70 MMHG | SYSTOLIC BLOOD PRESSURE: 112 MMHG

## 2018-09-05 DIAGNOSIS — G44.209 TENSION HEADACHE: Primary | ICD-10-CM

## 2018-09-05 DIAGNOSIS — Z23 NEED FOR INFLUENZA VACCINATION: ICD-10-CM

## 2018-09-05 DIAGNOSIS — Z85.3 HISTORY OF BREAST CANCER: ICD-10-CM

## 2018-09-05 DIAGNOSIS — Z12.39 SCREENING BREAST EXAMINATION: ICD-10-CM

## 2018-09-05 PROCEDURE — 90686 IIV4 VACC NO PRSV 0.5 ML IM: CPT | Performed by: FAMILY MEDICINE

## 2018-09-05 PROCEDURE — 96372 THER/PROPH/DIAG INJ SC/IM: CPT | Performed by: FAMILY MEDICINE

## 2018-09-05 PROCEDURE — 90471 IMMUNIZATION ADMIN: CPT | Performed by: FAMILY MEDICINE

## 2018-09-05 PROCEDURE — 99213 OFFICE O/P EST LOW 20 MIN: CPT | Performed by: FAMILY MEDICINE

## 2018-09-05 RX ORDER — KETOROLAC TROMETHAMINE 30 MG/ML
60 INJECTION, SOLUTION INTRAMUSCULAR; INTRAVENOUS ONCE
Status: COMPLETED | OUTPATIENT
Start: 2018-09-05 | End: 2018-09-05

## 2018-09-05 RX ORDER — METHOCARBAMOL 500 MG/1
500 TABLET, FILM COATED ORAL 4 TIMES DAILY
Qty: 40 TABLET | Refills: 0 | Status: SHIPPED | OUTPATIENT
Start: 2018-09-05 | End: 2018-12-11

## 2018-09-05 RX ADMIN — KETOROLAC TROMETHAMINE 60 MG: 30 INJECTION, SOLUTION INTRAMUSCULAR; INTRAVENOUS at 11:05

## 2018-09-05 NOTE — PROGRESS NOTES
04/25/18 102/70          No results found for this visit on 09/05/18. The 10-year ASCVD risk score (Alicia Weiss, et al., 2013) is: 3%    Values used to calculate the score:      Age: 51 years      Sex: Female      Is Non- : Yes      Diabetic: No      Tobacco smoker: No      Systolic Blood Pressure: 569 mmHg      Is BP treated: Yes      HDL Cholesterol: 47 mg/dL      Total Cholesterol: 267 mg/dL  Lab Review         Assessment:       Diagnosis Orders   1. Tension headache  methocarbamol (ROBAXIN) 500 MG tablet    ketorolac (TORADOL) injection 60 mg   2. Need for influenza vaccination  INFLUENZA, QUADV, 3 YRS AND OLDER, IM, PF, PREFILL SYR OR SDV, 0.5ML (FLUZONE QUADV, PF)   3. Screening breast examination  Mammography screening bilateral   4. History of breast cancer  Mammography screening bilateral           Plan:      See orders    Will let the ortho doctor manage the leg pain. Ice/heat/ROM.   Try her NSAIDS and Ibuprofen

## 2018-09-05 NOTE — LETTER
[address]  Phone: [phone]  Fax: [phone]    Jace Hardy MD        September 5, 2018     Patient: Sharifa Rasheed   YOB: 1970   Date of Visit: 9/5/2018       To Whom it May Concern:    Mariya Poon was seen in my clinic on 9/5/2018. If you have any questions or concerns, please don't hesitate to call.     Sincerely,         Jace Hardy MD

## 2018-09-06 ENCOUNTER — TELEPHONE (OUTPATIENT)
Dept: FAMILY MEDICINE CLINIC | Age: 48
End: 2018-09-06

## 2018-09-06 ENCOUNTER — TELEPHONE (OUTPATIENT)
Dept: ORTHOPEDIC SURGERY | Age: 48
End: 2018-09-06

## 2018-09-06 NOTE — TELEPHONE ENCOUNTER
Patient's MRI of the knee is normal and she can follow up as needed with me to discuss further options.

## 2018-09-07 NOTE — TELEPHONE ENCOUNTER
Looks like I last prescribed it in February. Since I'm seeing her Monday, will write it then. This is a controlled substance and needs proper documentation at the time of the appointment.

## 2018-09-10 ENCOUNTER — OFFICE VISIT (OUTPATIENT)
Dept: FAMILY MEDICINE CLINIC | Age: 48
End: 2018-09-10

## 2018-09-10 VITALS
HEART RATE: 63 BPM | WEIGHT: 264 LBS | BODY MASS INDEX: 41.44 KG/M2 | SYSTOLIC BLOOD PRESSURE: 110 MMHG | DIASTOLIC BLOOD PRESSURE: 70 MMHG | HEIGHT: 67 IN

## 2018-09-10 DIAGNOSIS — G89.29 CHRONIC PAIN OF LEFT LOWER EXTREMITY: ICD-10-CM

## 2018-09-10 DIAGNOSIS — Z00.00 ANNUAL PHYSICAL EXAM: Primary | ICD-10-CM

## 2018-09-10 DIAGNOSIS — M79.605 CHRONIC PAIN OF LEFT LOWER EXTREMITY: ICD-10-CM

## 2018-09-10 DIAGNOSIS — E66.01 OBESITY, CLASS III, BMI 40-49.9 (MORBID OBESITY) (HCC): ICD-10-CM

## 2018-09-10 LAB — GLUCOSE BLD-MCNC: 121 MG/DL

## 2018-09-10 PROCEDURE — 82962 GLUCOSE BLOOD TEST: CPT | Performed by: FAMILY MEDICINE

## 2018-09-10 PROCEDURE — 99396 PREV VISIT EST AGE 40-64: CPT | Performed by: FAMILY MEDICINE

## 2018-09-10 RX ORDER — GABAPENTIN 300 MG/1
300 CAPSULE ORAL NIGHTLY
Qty: 30 CAPSULE | Refills: 5 | Status: SHIPPED | OUTPATIENT
Start: 2018-09-10 | End: 2019-03-11
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
## 2018-09-11 ENCOUNTER — HOSPITAL ENCOUNTER (OUTPATIENT)
Dept: WOMENS IMAGING | Age: 48
Discharge: HOME | End: 2018-09-11
Attending: FAMILY MEDICINE | Admitting: FAMILY MEDICINE

## 2018-09-11 DIAGNOSIS — Z85.3 HISTORY OF BREAST CANCER: ICD-10-CM

## 2018-09-11 DIAGNOSIS — Z12.39 SCREENING BREAST EXAMINATION: ICD-10-CM

## 2018-10-09 ENCOUNTER — OFFICE VISIT (OUTPATIENT)
Dept: ORTHOPEDIC SURGERY | Age: 48
End: 2018-10-09

## 2018-10-09 VITALS — BODY MASS INDEX: 41.44 KG/M2 | HEIGHT: 67 IN | RESPIRATION RATE: 16 BRPM | WEIGHT: 264 LBS

## 2018-10-09 DIAGNOSIS — M77.11 LATERAL EPICONDYLITIS OF RIGHT ELBOW: Primary | ICD-10-CM

## 2018-10-09 PROCEDURE — 20550 NJX 1 TENDON SHEATH/LIGAMENT: CPT | Performed by: PHYSICIAN ASSISTANT

## 2018-10-09 PROCEDURE — 99212 OFFICE O/P EST SF 10 MIN: CPT | Performed by: PHYSICIAN ASSISTANT

## 2018-10-09 ASSESSMENT — ENCOUNTER SYMPTOMS
EYES NEGATIVE: 1
GASTROINTESTINAL NEGATIVE: 1
BACK PAIN: 1

## 2018-10-09 NOTE — LETTER
[facility]  [address] [place] [address]. [address] [place] [address]  Phone: [phone]  Fax: [phone]    Mary Villalpando        October 9, 2018     Patient: Lee Rock   YOB: 1970   Date of Visit: 10/9/2018       To Whom it May Concern:    Marlin Jiménez was seen in my clinic on 10/9/2018. She may return to work on 10/9/18. If you have any questions or concerns, please don't hesitate to call.     Sincerely,         Palomo Lafleur PA-C

## 2018-10-09 NOTE — PATIENT INSTRUCTIONS
Start Physical therapy  Continue to wear elbow strap as needed  Follow-up with Dr. Abram Robison if persistent pain after 6 weeks.

## 2018-10-22 ENCOUNTER — HOSPITAL ENCOUNTER (OUTPATIENT)
Dept: OCCUPATIONAL THERAPY | Age: 48
Discharge: HOME | End: 2018-10-22

## 2018-10-22 NOTE — FLOWSHEET NOTE
Occupational Therapy  Cancellation/No-show Note  Patient Name:  Indira June  :  1970   Date:  10/22/2018  Cancelled visits to date: 1  No-shows to date: 0    For today's appointment patient:  [x]    Cancelled  []    Rescheduled appointment  []    No-show     Reason given by patient:  []    Patient ill  []    Conflicting appointment  []    No transportation    []    Conflict with work  []    No reason given  []    Other:     Comments:  Cancelled evaluation OT    Electronically signed by:  VANESA Jacques, 10/22/2018, 5:25 PM

## 2018-12-11 ENCOUNTER — OFFICE VISIT (OUTPATIENT)
Dept: FAMILY MEDICINE CLINIC | Age: 48
End: 2018-12-11
Payer: COMMERCIAL

## 2018-12-11 VITALS
HEART RATE: 73 BPM | BODY MASS INDEX: 41.4 KG/M2 | WEIGHT: 263.8 LBS | SYSTOLIC BLOOD PRESSURE: 120 MMHG | DIASTOLIC BLOOD PRESSURE: 84 MMHG | OXYGEN SATURATION: 97 % | TEMPERATURE: 98.2 F | HEIGHT: 67 IN

## 2018-12-11 DIAGNOSIS — Z77.22 EXPOSURE TO SECOND HAND TOBACCO SMOKE: ICD-10-CM

## 2018-12-11 DIAGNOSIS — J20.9 ACUTE BRONCHITIS, UNSPECIFIED ORGANISM: ICD-10-CM

## 2018-12-11 DIAGNOSIS — R73.02 IMPAIRED GLUCOSE TOLERANCE: Primary | ICD-10-CM

## 2018-12-11 DIAGNOSIS — E78.5 HYPERLIPIDEMIA, UNSPECIFIED HYPERLIPIDEMIA TYPE: ICD-10-CM

## 2018-12-11 DIAGNOSIS — I10 ESSENTIAL HYPERTENSION: ICD-10-CM

## 2018-12-11 DIAGNOSIS — J45.20 MILD INTERMITTENT ASTHMA WITHOUT COMPLICATION: ICD-10-CM

## 2018-12-11 DIAGNOSIS — E66.01 OBESITY, CLASS III, BMI 40-49.9 (MORBID OBESITY) (HCC): ICD-10-CM

## 2018-12-11 LAB
ANION GAP SERPL CALCULATED.3IONS-SCNC: 10 MMOL/L (ref 3–16)
BUN BLDV-MCNC: 8 MG/DL (ref 7–20)
CALCIUM SERPL-MCNC: 9.8 MG/DL (ref 8.3–10.6)
CHLORIDE BLD-SCNC: 105 MMOL/L (ref 99–110)
CHOLESTEROL, TOTAL: 218 MG/DL (ref 0–199)
CO2: 30 MMOL/L (ref 21–32)
CREAT SERPL-MCNC: 0.6 MG/DL (ref 0.6–1.1)
GFR AFRICAN AMERICAN: >60
GFR NON-AFRICAN AMERICAN: >60
GLUCOSE BLD-MCNC: 88 MG/DL (ref 70–99)
HDLC SERPL-MCNC: 49 MG/DL (ref 40–60)
LDL CHOLESTEROL CALCULATED: 142 MG/DL
POTASSIUM SERPL-SCNC: 4.3 MMOL/L (ref 3.5–5.1)
SODIUM BLD-SCNC: 145 MMOL/L (ref 136–145)
TRIGL SERPL-MCNC: 134 MG/DL (ref 0–150)
VLDLC SERPL CALC-MCNC: 27 MG/DL

## 2018-12-11 PROCEDURE — 36415 COLL VENOUS BLD VENIPUNCTURE: CPT | Performed by: FAMILY MEDICINE

## 2018-12-11 PROCEDURE — 99214 OFFICE O/P EST MOD 30 MIN: CPT | Performed by: FAMILY MEDICINE

## 2018-12-11 PROCEDURE — 94640 AIRWAY INHALATION TREATMENT: CPT | Performed by: FAMILY MEDICINE

## 2018-12-11 RX ORDER — ATORVASTATIN CALCIUM 80 MG/1
80 TABLET, FILM COATED ORAL DAILY
Qty: 30 TABLET | Refills: 2 | Status: SHIPPED | OUTPATIENT
Start: 2018-12-11 | End: 2019-03-11 | Stop reason: SDUPTHER

## 2018-12-11 RX ORDER — ALBUTEROL SULFATE 90 UG/1
2 AEROSOL, METERED RESPIRATORY (INHALATION) EVERY 6 HOURS PRN
Qty: 1 INHALER | Refills: 2 | Status: SHIPPED | OUTPATIENT
Start: 2018-12-11 | End: 2020-03-10 | Stop reason: SDUPTHER

## 2018-12-11 RX ORDER — PROMETHAZINE HYDROCHLORIDE AND CODEINE PHOSPHATE 6.25; 1 MG/5ML; MG/5ML
5 SYRUP ORAL EVERY 4 HOURS PRN
Qty: 100 ML | Refills: 0 | Status: SHIPPED | OUTPATIENT
Start: 2018-12-11 | End: 2018-12-16

## 2018-12-11 RX ORDER — METOPROLOL SUCCINATE 100 MG/1
100 TABLET, EXTENDED RELEASE ORAL DAILY
Qty: 30 TABLET | Refills: 2 | Status: SHIPPED | OUTPATIENT
Start: 2018-12-11 | End: 2019-03-11 | Stop reason: SDUPTHER

## 2018-12-11 RX ORDER — BECLOMETHASONE DIPROPIONATE HFA 80 UG/1
2 AEROSOL, METERED RESPIRATORY (INHALATION) 2 TIMES DAILY
Qty: 1 INHALER | Refills: 2 | Status: SHIPPED | OUTPATIENT
Start: 2018-12-11 | End: 2019-03-11 | Stop reason: SDUPTHER

## 2018-12-11 RX ORDER — ALBUTEROL SULFATE 2.5 MG/3ML
2.5 SOLUTION RESPIRATORY (INHALATION) ONCE
Status: COMPLETED | OUTPATIENT
Start: 2018-12-11 | End: 2018-12-11

## 2018-12-11 RX ORDER — TRIAMTERENE AND HYDROCHLOROTHIAZIDE 37.5; 25 MG/1; MG/1
1 CAPSULE ORAL EVERY MORNING
Qty: 30 CAPSULE | Refills: 2 | Status: SHIPPED | OUTPATIENT
Start: 2018-12-11 | End: 2019-03-11

## 2018-12-11 RX ADMIN — ALBUTEROL SULFATE 2.5 MG: 2.5 SOLUTION RESPIRATORY (INHALATION) at 12:30

## 2018-12-11 ASSESSMENT — ENCOUNTER SYMPTOMS: COUGH: 1

## 2018-12-11 NOTE — PROGRESS NOTES
Patient ID: Ismael Harmon 1970    . URI    This is a new (Is living with a smoker and believes this is causing her to be sick.) problem. Episode onset: 2 days. Associated symptoms include coughing, headaches and sinus pain. Pertinent negatives include no chest pain. Associated symptoms comments: SOB. Hypertension   This is a chronic problem. The current episode started more than 1 year ago. Associated symptoms include headaches and shortness of breath. Pertinent negatives include no chest pain or palpitations. Past treatments include beta blockers and calcium channel blockers. Hyperlipidemia   This is a chronic problem. The current episode started more than 1 year ago. The problem is uncontrolled. Recent lipid tests were reviewed and are high. Associated symptoms include shortness of breath. Pertinent negatives include no chest pain. Current antihyperlipidemic treatment includes statins. The current treatment provides mild improvement of lipids. Compliance problems include psychosocial issues. Risk factors for coronary artery disease include obesity, stress and a sedentary lifestyle. Headache    Associated symptoms include coughing. Mental Health Problem   The primary symptoms include dysphoric mood. The current episode started more than 1 month ago. This is a new problem. The onset of the illness is precipitated by a stressful event and emotional stress (death of mother, then sister, then uncle.   unfaithful again so going through divorce). The degree of incapacity that she is experiencing as a consequence of her illness is moderate. Sequelae of the illness include harmed interpersonal relations. Additional symptoms of the illness include anhedonia and headaches. She does not admit to suicidal ideas. She does not have a plan to commit suicide. She has not already injured self. Review of Systems   HENT: Positive for sinus pain.     Respiratory: Positive for cough and shortness of breath. Negative for chest tightness. Cardiovascular: Negative for chest pain, palpitations and leg swelling. Neurological: Positive for headaches. Psychiatric/Behavioral: Positive for dysphoric mood. The patient is nervous/anxious. Patient Active Problem List   Diagnosis    History of breast cancer    Hyperlipidemia    Headache    Asthma    Impaired glucose tolerance    Essential hypertension    Left foot pain    Obesity, Class III, BMI 40-49.9 (morbid obesity) ([address]    Chronic pain of left lower extremity       Past Medical History:   Diagnosis Date    Asthma     History of breast cancer     HTN (hypertension) 2013    Hyperlipidemia 10/2012    Migraines        Past Surgical History:   Procedure Laterality Date    BREAST BIOPSY      COLONOSCOPY      polyp. Repeat in     COLONOSCOPY  2016    diverticulosis    HYSTERECTOMY      Cysts, Endometriosis. 1 ovary remains    TONSILLECTOMY AND ADENOIDECTOMY      TUBAL LIGATION         Family History   Problem Relation Age of Onset    Hypertension Father     Stroke Maternal Aunt     Diabetes Paternal Aunt     Diabetes Paternal Uncle     Stroke Maternal Grandmother     Depression Son     Asthma Son     Stroke Mother 79         in    Corry Fuentes Sister                Current Outpatient Prescriptions on File Prior to Visit   Medication Sig Dispense Refill    sertraline (ZOLOFT) 50 MG tablet Take 1 tablet by mouth daily 30 tablet 11    Elastic Bandages & Supports (TENNIS ELBOW NEOPRENE BRACE) MISC Wear daily (Patient taking differently: Wear daily) 1 each 0    gabapentin (NEURONTIN) 300 MG capsule Take 1 capsule by mouth nightly for 153 days. . 30 capsule 5    SUMAtriptan (IMITREX) 100 MG tablet Take 100 mg by mouth once as needed for Migraine       Current Facility-Administered Medications on File Prior to Visit   Medication Dose Route Frequency Provider Last Rate Last Dose    promethazine

## 2018-12-12 ENCOUNTER — TELEPHONE (OUTPATIENT)
Dept: FAMILY MEDICINE CLINIC | Age: 48
End: 2018-12-12

## 2018-12-12 LAB
ESTIMATED AVERAGE GLUCOSE: 119.8 MG/DL
HBA1C MFR BLD: 5.8 %

## 2018-12-12 ASSESSMENT — ENCOUNTER SYMPTOMS
SINUS PAIN: 1
CHEST TIGHTNESS: 0
SHORTNESS OF BREATH: 1

## 2018-12-14 ENCOUNTER — TELEPHONE (OUTPATIENT)
Dept: FAMILY MEDICINE CLINIC | Age: 48
End: 2018-12-14

## 2018-12-14 DIAGNOSIS — J20.9 ACUTE BRONCHITIS, UNSPECIFIED ORGANISM: Primary | ICD-10-CM

## 2018-12-14 RX ORDER — SULFAMETHOXAZOLE AND TRIMETHOPRIM 800; 160 MG/1; MG/1
1 TABLET ORAL 2 TIMES DAILY
Qty: 14 TABLET | Refills: 0 | Status: SHIPPED | OUTPATIENT
Start: 2018-12-14 | End: 2018-12-21

## 2019-03-11 ENCOUNTER — OFFICE VISIT (OUTPATIENT)
Dept: FAMILY MEDICINE CLINIC | Age: 49
End: 2019-03-11
Payer: COMMERCIAL

## 2019-03-11 VITALS
BODY MASS INDEX: 39.65 KG/M2 | WEIGHT: 252.6 LBS | HEART RATE: 97 BPM | DIASTOLIC BLOOD PRESSURE: 70 MMHG | HEIGHT: 67 IN | SYSTOLIC BLOOD PRESSURE: 100 MMHG

## 2019-03-11 DIAGNOSIS — E78.5 HYPERLIPIDEMIA, UNSPECIFIED HYPERLIPIDEMIA TYPE: ICD-10-CM

## 2019-03-11 DIAGNOSIS — F33.41 RECURRENT MAJOR DEPRESSIVE DISORDER, IN PARTIAL REMISSION (HCC): ICD-10-CM

## 2019-03-11 DIAGNOSIS — G43.909 MIGRAINE WITHOUT STATUS MIGRAINOSUS, NOT INTRACTABLE, UNSPECIFIED MIGRAINE TYPE: ICD-10-CM

## 2019-03-11 DIAGNOSIS — F33.1 MODERATE EPISODE OF RECURRENT MAJOR DEPRESSIVE DISORDER (HCC): Primary | ICD-10-CM

## 2019-03-11 DIAGNOSIS — J45.20 MILD INTERMITTENT ASTHMA WITHOUT COMPLICATION: ICD-10-CM

## 2019-03-11 DIAGNOSIS — I10 ESSENTIAL HYPERTENSION: ICD-10-CM

## 2019-03-11 PROCEDURE — 99214 OFFICE O/P EST MOD 30 MIN: CPT | Performed by: FAMILY MEDICINE

## 2019-03-11 PROCEDURE — G0444 DEPRESSION SCREEN ANNUAL: HCPCS | Performed by: FAMILY MEDICINE

## 2019-03-11 RX ORDER — METOPROLOL SUCCINATE 100 MG/1
100 TABLET, EXTENDED RELEASE ORAL DAILY
Qty: 30 TABLET | Refills: 5 | Status: SHIPPED | OUTPATIENT
Start: 2019-03-11 | End: 2019-08-20 | Stop reason: SDUPTHER

## 2019-03-11 RX ORDER — METOPROLOL SUCCINATE 100 MG/1
100 TABLET, EXTENDED RELEASE ORAL DAILY
Qty: 30 TABLET | Refills: 2 | Status: SHIPPED | OUTPATIENT
Start: 2019-03-11 | End: 2019-03-11 | Stop reason: SDUPTHER

## 2019-03-11 RX ORDER — ATORVASTATIN CALCIUM 80 MG/1
80 TABLET, FILM COATED ORAL DAILY
Qty: 30 TABLET | Refills: 5 | Status: SHIPPED | OUTPATIENT
Start: 2019-03-11 | End: 2019-08-20 | Stop reason: SDUPTHER

## 2019-03-11 RX ORDER — ATORVASTATIN CALCIUM 80 MG/1
80 TABLET, FILM COATED ORAL DAILY
Qty: 30 TABLET | Refills: 2 | Status: SHIPPED | OUTPATIENT
Start: 2019-03-11 | End: 2019-03-11 | Stop reason: SDUPTHER

## 2019-03-11 RX ORDER — BECLOMETHASONE DIPROPIONATE HFA 80 UG/1
2 AEROSOL, METERED RESPIRATORY (INHALATION) 2 TIMES DAILY
Qty: 1 INHALER | Refills: 2 | Status: SHIPPED | OUTPATIENT
Start: 2019-03-11 | End: 2019-03-11 | Stop reason: SDUPTHER

## 2019-03-11 RX ORDER — ATORVASTATIN CALCIUM 80 MG/1
80 TABLET, FILM COATED ORAL DAILY
Qty: 30 TABLET | Refills: 5 | Status: SHIPPED | OUTPATIENT
Start: 2019-03-11 | End: 2019-04-17

## 2019-03-11 RX ORDER — SUMATRIPTAN 100 MG/1
100 TABLET, FILM COATED ORAL
Qty: 9 TABLET | Refills: 11 | Status: SHIPPED | OUTPATIENT
Start: 2019-03-11 | End: 2020-03-10 | Stop reason: SDUPTHER

## 2019-03-11 RX ORDER — BECLOMETHASONE DIPROPIONATE HFA 80 UG/1
2 AEROSOL, METERED RESPIRATORY (INHALATION) 2 TIMES DAILY
Qty: 1 INHALER | Refills: 5 | Status: SHIPPED | OUTPATIENT
Start: 2019-03-11 | End: 2019-08-20 | Stop reason: SDUPTHER

## 2019-03-11 RX ORDER — SERTRALINE HYDROCHLORIDE 100 MG/1
100 TABLET, FILM COATED ORAL DAILY
Qty: 30 TABLET | Refills: 0 | Status: SHIPPED | OUTPATIENT
Start: 2019-03-11 | End: 2019-04-24 | Stop reason: SDUPTHER

## 2019-03-11 ASSESSMENT — PATIENT HEALTH QUESTIONNAIRE - PHQ9
SUM OF ALL RESPONSES TO PHQ QUESTIONS 1-9: 20
1. LITTLE INTEREST OR PLEASURE IN DOING THINGS: 3
7. TROUBLE CONCENTRATING ON THINGS, SUCH AS READING THE NEWSPAPER OR WATCHING TELEVISION: 3
6. FEELING BAD ABOUT YOURSELF - OR THAT YOU ARE A FAILURE OR HAVE LET YOURSELF OR YOUR FAMILY DOWN: 3
SUM OF ALL RESPONSES TO PHQ9 QUESTIONS 1 & 2: 6
9. THOUGHTS THAT YOU WOULD BE BETTER OFF DEAD, OR OF HURTING YOURSELF: 0
8. MOVING OR SPEAKING SO SLOWLY THAT OTHER PEOPLE COULD HAVE NOTICED. OR THE OPPOSITE, BEING SO FIGETY OR RESTLESS THAT YOU HAVE BEEN MOVING AROUND A LOT MORE THAN USUAL: 3
5. POOR APPETITE OR OVEREATING: 1
SUM OF ALL RESPONSES TO PHQ QUESTIONS 1-9: 20
4. FEELING TIRED OR HAVING LITTLE ENERGY: 3
3. TROUBLE FALLING OR STAYING ASLEEP: 1
2. FEELING DOWN, DEPRESSED OR HOPELESS: 3
10. IF YOU CHECKED OFF ANY PROBLEMS, HOW DIFFICULT HAVE THESE PROBLEMS MADE IT FOR YOU TO DO YOUR WORK, TAKE CARE OF THINGS AT HOME, OR GET ALONG WITH OTHER PEOPLE: 2

## 2019-03-12 ASSESSMENT — ENCOUNTER SYMPTOMS: SHORTNESS OF BREATH: 1

## 2019-04-17 ENCOUNTER — APPOINTMENT (OUTPATIENT)
Dept: GENERAL RADIOLOGY | Age: 49
End: 2019-04-17
Payer: COMMERCIAL

## 2019-04-17 ENCOUNTER — HOSPITAL ENCOUNTER (EMERGENCY)
Age: 49
Discharge: HOME | End: 2019-04-17
Attending: EMERGENCY MEDICINE
Payer: COMMERCIAL

## 2019-04-17 ENCOUNTER — OFFICE VISIT (OUTPATIENT)
Dept: FAMILY MEDICINE CLINIC | Age: 49
End: 2019-04-17
Payer: COMMERCIAL

## 2019-04-17 VITALS
SYSTOLIC BLOOD PRESSURE: 104 MMHG | HEIGHT: 67 IN | TEMPERATURE: 98.1 F | BODY MASS INDEX: 39.58 KG/M2 | DIASTOLIC BLOOD PRESSURE: 80 MMHG | OXYGEN SATURATION: 97 % | WEIGHT: 252.2 LBS | HEART RATE: 60 BPM

## 2019-04-17 VITALS
RESPIRATION RATE: 18 BRPM | HEART RATE: 54 BPM | OXYGEN SATURATION: 99 % | HEIGHT: 68 IN | SYSTOLIC BLOOD PRESSURE: 124 MMHG | WEIGHT: 252 LBS | DIASTOLIC BLOOD PRESSURE: 82 MMHG | BODY MASS INDEX: 38.19 KG/M2 | TEMPERATURE: 98.5 F

## 2019-04-17 DIAGNOSIS — R42 DIZZINESS: ICD-10-CM

## 2019-04-17 DIAGNOSIS — R00.2 PALPITATIONS: Primary | ICD-10-CM

## 2019-04-17 DIAGNOSIS — R51.9 ACUTE NONINTRACTABLE HEADACHE, UNSPECIFIED HEADACHE TYPE: ICD-10-CM

## 2019-04-17 DIAGNOSIS — G44.209 ACUTE NON INTRACTABLE TENSION-TYPE HEADACHE: ICD-10-CM

## 2019-04-17 DIAGNOSIS — R00.2 HEART PALPITATIONS: Primary | ICD-10-CM

## 2019-04-17 LAB
ALBUMIN SERPL-MCNC: 4.3 GM/DL (ref 3.4–5)
ALP BLD-CCNC: 73 IU/L (ref 40–129)
ALT SERPL-CCNC: 20 U/L (ref 10–40)
ANION GAP SERPL CALCULATED.3IONS-SCNC: 11 MMOL/L (ref 4–16)
AST SERPL-CCNC: 20 IU/L (ref 15–37)
BASOPHILS ABSOLUTE: 0.1 K/CU MM
BASOPHILS RELATIVE PERCENT: 0.9 % (ref 0–1)
BILIRUB SERPL-MCNC: 1 MG/DL (ref 0–1)
BUN BLDV-MCNC: 8 MG/DL (ref 6–23)
CALCIUM SERPL-MCNC: 9.8 MG/DL (ref 8.3–10.6)
CHLORIDE BLD-SCNC: 101 MMOL/L (ref 99–110)
CO2: 25 MMOL/L (ref 21–32)
CREAT SERPL-MCNC: 0.7 MG/DL (ref 0.6–1.1)
DIFFERENTIAL TYPE: ABNORMAL
EOSINOPHILS ABSOLUTE: 0.1 K/CU MM
EOSINOPHILS RELATIVE PERCENT: 1.4 % (ref 0–3)
GFR AFRICAN AMERICAN: >60 ML/MIN/1.73M2
GFR NON-AFRICAN AMERICAN: >60 ML/MIN/1.73M2
GLUCOSE BLD-MCNC: 94 MG/DL (ref 70–99)
HCT VFR BLD CALC: 42.6 % (ref 37–47)
HEMOGLOBIN: 13.1 GM/DL (ref 12.5–16)
IMMATURE NEUTROPHIL %: 0.1 % (ref 0–0.43)
LYMPHOCYTES ABSOLUTE: 2.4 K/CU MM
LYMPHOCYTES RELATIVE PERCENT: 30.1 % (ref 24–44)
MCH RBC QN AUTO: 29.3 PG (ref 27–31)
MCHC RBC AUTO-ENTMCNC: 30.8 % (ref 32–36)
MCV RBC AUTO: 95.3 FL (ref 78–100)
MONOCYTES ABSOLUTE: 0.5 K/CU MM
MONOCYTES RELATIVE PERCENT: 6 % (ref 0–4)
NUCLEATED RBC %: 0 %
PDW BLD-RTO: 13.5 % (ref 11.7–14.9)
PLATELET # BLD: 267 K/CU MM (ref 140–440)
PMV BLD AUTO: 10.6 FL (ref 7.5–11.1)
POTASSIUM SERPL-SCNC: 3.8 MMOL/L (ref 3.5–5.1)
RBC # BLD: 4.47 M/CU MM (ref 4.2–5.4)
REASON FOR REJECTION: NORMAL
REJECTED TEST: NORMAL
SEGMENTED NEUTROPHILS ABSOLUTE COUNT: 4.9 K/CU MM
SEGMENTED NEUTROPHILS RELATIVE PERCENT: 61.5 % (ref 36–66)
SODIUM BLD-SCNC: 137 MMOL/L (ref 135–145)
TOTAL IMMATURE NEUTOROPHIL: 0.01 K/CU MM
TOTAL NUCLEATED RBC: 0 K/CU MM
TOTAL PROTEIN: 7.7 GM/DL (ref 6.4–8.2)
TROPONIN T: <0.01 NG/ML
WBC # BLD: 7.9 K/CU MM (ref 4–10.5)

## 2019-04-17 PROCEDURE — 96372 THER/PROPH/DIAG INJ SC/IM: CPT | Performed by: FAMILY MEDICINE

## 2019-04-17 PROCEDURE — 71046 X-RAY EXAM CHEST 2 VIEWS: CPT

## 2019-04-17 PROCEDURE — 93000 ELECTROCARDIOGRAM COMPLETE: CPT | Performed by: FAMILY MEDICINE

## 2019-04-17 PROCEDURE — 93226 XTRNL ECG REC<48 HR SCAN A/R: CPT

## 2019-04-17 PROCEDURE — 84484 ASSAY OF TROPONIN QUANT: CPT

## 2019-04-17 PROCEDURE — 93005 ELECTROCARDIOGRAM TRACING: CPT | Performed by: EMERGENCY MEDICINE

## 2019-04-17 PROCEDURE — 99285 EMERGENCY DEPT VISIT HI MDM: CPT

## 2019-04-17 PROCEDURE — 99215 OFFICE O/P EST HI 40 MIN: CPT | Performed by: FAMILY MEDICINE

## 2019-04-17 PROCEDURE — 36415 COLL VENOUS BLD VENIPUNCTURE: CPT

## 2019-04-17 PROCEDURE — 93225 XTRNL ECG REC<48 HRS REC: CPT

## 2019-04-17 PROCEDURE — 80053 COMPREHEN METABOLIC PANEL: CPT

## 2019-04-17 PROCEDURE — 85025 COMPLETE CBC W/AUTO DIFF WBC: CPT

## 2019-04-17 RX ORDER — TRIAMTERENE AND HYDROCHLOROTHIAZIDE 37.5; 25 MG/1; MG/1
1 CAPSULE ORAL DAILY
Refills: 2 | COMMUNITY
Start: 2019-03-11 | End: 2019-04-24 | Stop reason: SDUPTHER

## 2019-04-17 RX ORDER — KETOROLAC TROMETHAMINE 30 MG/ML
60 INJECTION, SOLUTION INTRAMUSCULAR; INTRAVENOUS ONCE
Status: COMPLETED | OUTPATIENT
Start: 2019-04-17 | End: 2019-04-17

## 2019-04-17 RX ADMIN — KETOROLAC TROMETHAMINE 60 MG: 30 INJECTION, SOLUTION INTRAMUSCULAR; INTRAVENOUS at 11:49

## 2019-04-17 ASSESSMENT — ENCOUNTER SYMPTOMS
NAUSEA: 1
PHOTOPHOBIA: 1
VOMITING: 0

## 2019-04-17 NOTE — PROGRESS NOTES
Patient ID: Indira June 1970    Chief Complaint   Patient presents with    Headache     since this morning    Nausea     since today as headache getting worse    Dizziness     it's every day     Palpitations    Swelling     of hand         Headache    This is a new problem. The current episode started today. The pain is located in the frontal region. The pain quality is not similar to prior headaches. Associated symptoms include dizziness, nausea and photophobia. Pertinent negatives include no vomiting. She has tried nothing for the symptoms. The treatment provided no relief. Her past medical history is significant for migraine headaches. Dizziness   Associated symptoms include headaches and nausea. Pertinent negatives include no vomiting. Associated symptoms comments: palpitations. Palpitations    This is a new problem. The current episode started in the past 7 days. The problem occurs constantly. The problem has been unchanged. Nothing aggravates the symptoms. Associated symptoms include dizziness and nausea. Pertinent negatives include no vomiting. Review of Systems   Unable to perform ROS: Acuity of condition   Constitutional: Positive for activity change. Eyes: Positive for photophobia. Cardiovascular: Positive for palpitations. Gastrointestinal: Positive for nausea. Negative for vomiting. Neurological: Positive for dizziness and headaches.        Patient Active Problem List   Diagnosis    History of breast cancer    Hyperlipidemia    Headache    Asthma    Impaired glucose tolerance    Essential hypertension    Left foot pain    Obesity, Class III, BMI 40-49.9 (morbid obesity) ([address]    Chronic pain of left lower extremity       Past Medical History:   Diagnosis Date    Asthma     History of breast cancer 2009    HTN (hypertension) 9/18/2013    Hyperlipidemia 10/2012    Migraines        Past Surgical History:   Procedure Laterality Date    BREAST BIOPSY  2009    Eyes: Pupils are equal, round, and reactive to light. Neck: Neck supple. No tracheal deviation present. No thyromegaly present. Cardiovascular: Normal rate, regular rhythm, S1 normal, S2 normal and normal heart sounds. Pulmonary/Chest: Effort normal and breath sounds normal. No respiratory distress. She has no wheezes. Abdominal: Soft. She exhibits no distension and no mass. There is no tenderness. Musculoskeletal: She exhibits no edema. Neurological: She is alert and oriented to person, place, and time. No cranial nerve deficit or sensory deficit. She displays no seizure activity. .     Skin: Skin is warm, dry and intact. Psychiatric: She has a normal mood and affect. Her speech is normal and behavior is normal. Judgment and thought content normal. She is not actively hallucinating. Cognition and memory are normal. She is attentive. Nursing note and vitals reviewed. Vitals:    04/17/19 1100   BP: 104/80   Site: Left Upper Arm   Position: Sitting   Cuff Size: Large Adult   Pulse: 60   Temp: 98.1 °F (36.7 °C)   TempSrc: Oral   SpO2: 97%   Weight: 252 lb 3.2 oz (114.4 kg)   Height: 5' 7\" (1.702 m)     Body mass index is 39.5 kg/m². Wt Readings from Last 3 Encounters:   04/17/19 252 lb 3.2 oz (114.4 kg)   03/11/19 252 lb 9.6 oz (114.6 kg)   12/11/18 263 lb 12.8 oz (119.7 kg)     BP Readings from Last 3 Encounters:   04/17/19 129/85   04/17/19 104/80   03/11/19 100/70      EKG: normal EKG, normal sinus rhythm. No results found for this visit on 04/17/19. Assessment:       Diagnosis Orders   1. Heart palpitations  EKG 12 lead   2. Acute non intractable tension-type headache  ketorolac (TORADOL) injection 60 mg   3. Dizziness             Plan:      Spoke to charge nurse in ER. Gave summary of cardiac symptoms. Needs to be on heart monitor    F/u after ER    Patient's  will come to pick her up.     Patient did report relief in her headache after getting the Toradol shot    Severity of symptoms to high complex to be addressed here.

## 2019-04-17 NOTE — ED PROVIDER NOTES
Triage Chief Complaint:   Palpitations (x 1 week; sent by Dr Jose Stern, EKG in PCP office was NSR; pt denies chest pain; reports associated shortness of breath) and Dizziness (intermittent; reports this occurs with quick movements such as change in position from sitting to standing after sitting for a while)    [place]:  Kelsie Gandhi is a 51 y.o. female that presents with palpitations and headache. She states that over a week she has had intermittent palpitations that last for a few seconds at a time usually while sitting down or lying in bed. She denies any triggering events, alleviating or exacerbating factors. Sometimes has some associated shortness of breath. Denies chest pain, fever, cough, vomiting, nausea, vomiting, diarrhea. States that she had gradual onset frontal throbbing headache today and has not had a headache like that a few years. This was better after Toradol at her PCPs office. She was sent for further evaluation of palpitations. Denies lower extremity pain or swelling. Denies drug or alcohol use, recent medication changes. ROS:  At least 14 systems reviewed and otherwise acutely negative except as in the [address] Past Medical History:   Diagnosis Date    Asthma     History of breast cancer     HTN (hypertension) 2013    Hyperlipidemia 10/2012    Migraines      Past Surgical History:   Procedure Laterality Date    BREAST BIOPSY      COLONOSCOPY      polyp. Repeat in     COLONOSCOPY  2016    diverticulosis    HYSTERECTOMY      Cysts, Endometriosis.   1 ovary remains    TONSILLECTOMY AND ADENOIDECTOMY      TUBAL LIGATION       Family History   Problem Relation Age of Onset    Hypertension Father     Stroke Maternal Aunt     Diabetes Paternal Aunt     Diabetes Paternal Uncle     Stroke Maternal Grandmother     Depression Son     Asthma Son     Stroke Mother 79         in 2017   Conchis Marie Sister              Social History Socioeconomic History    Marital status: Legally      Spouse name: Not on file    Number of children: Not on file    Years of education: Not on file    Highest education level: Not on file   Occupational History    Not on file   Social Needs    Financial resource strain: Not on file    Food insecurity:     Worry: Not on file     Inability: Not on file    Transportation needs:     Medical: Not on file     Non-medical: Not on file   Tobacco Use    Smoking status: Never Smoker    Smokeless tobacco: Never Used   Substance and Sexual Activity    Alcohol use: Yes     Comment: occasionally    Drug use: No    Sexual activity: Not Currently     Partners: Male   Lifestyle    Physical activity:     Days per week: Not on file     Minutes per session: Not on file    Stress: Not on file   Relationships    Social connections:     Talks on phone: Not on file     Gets together: Not on file     Attends Jain service: Not on file     Active member of club or organization: Not on file     Attends meetings of clubs or organizations: Not on file     Relationship status: Not on file    Intimate partner violence:     Fear of current or ex partner: Not on file     Emotionally abused: Not on file     Physically abused: Not on file     Forced sexual activity: Not on file   Other Topics Concern    Not on file   Social History Narrative    Not on file     Current Facility-Administered Medications   Medication Dose Route Frequency Provider Last Rate Last Dose    promethazine (PHENERGAN) injection 25 mg  25 mg Intramuscular Q6H PRN Beatriz Schaeffer MD   25 mg at 03/25/14 9160     Current Outpatient Medications   Medication Sig Dispense Refill    triamterene-hydrochlorothiazide (DYAZIDE) 37.5-25 MG per capsule Take 1 capsule by mouth daily  2    sertraline (ZOLOFT) 100 MG tablet Take 1 tablet by mouth daily 30 tablet 0    SUMAtriptan (IMITREX) 100 MG tablet Take 1 tablet by mouth once as needed for Migraine 9 tablet 11    atorvastatin (LIPITOR) 80 MG tablet Take 1 tablet by mouth daily 30 tablet 5    QVAR REDIHALER 80 MCG/ACT AERB inhaler Inhale 2 puffs into the lungs 2 times daily 1 Inhaler 5    metoprolol succinate (TOPROL XL) 100 MG extended release tablet Take 1 tablet by mouth daily 30 tablet 5    albuterol sulfate  (90 Base) MCG/ACT inhaler Inhale 2 puffs into the lungs every 6 hours as needed for Wheezing or Shortness of Breath 1 Inhaler 2    Elastic Bandages & Supports (TENNIS ELBOW NEOPRENE BRACE) MISC Wear daily (Patient taking differently: Wear daily) 1 each 0     No Known Allergies    Nursing Notes Reviewed    Physical Exam:  ED Triage Vitals   Enc Vitals Group      BP 04/17/19 1236 129/85      Pulse 04/17/19 1236 52      Resp 04/17/19 1236 18      Temp 04/17/19 1236 98.5 °F (36.9 °C)      Temp Source 04/17/19 1236 Oral      SpO2 04/17/19 1236 100 %      Weight 04/17/19 1305 252 lb (114.3 kg)      Height 04/17/19 1305 5' 8\" (1.727 m)      Head Circumference --       Peak Flow --       Pain Score --       Pain Loc --       Pain Edu? --       Excl. in [address]? --      GENERAL APPEARANCE: Awake and alert. Cooperative. No acute distress. HEAD: Normocephalic. Atraumatic. EYES: EOM's grossly intact. Sclera anicteric. ENT: Mucous membranes are moist. Tolerates saliva. No trismus. NECK: Supple. Trachea midline. HEART: RRR. Radial pulses 2+. LUNGS: Respirations unlabored. CTAB  ABDOMEN: Soft. Non-tender. No guarding or rebound. EXTREMITIES: No acute deformities. SKIN: Warm and dry. NEUROLOGICAL: No gross facial drooping. Moves all 4 extremities spontaneously. PSYCHIATRIC: Normal mood.     I have reviewed and interpreted all of the currently available lab results from this visit (if applicable):  Results for orders placed or performed during the hospital encounter of 04/17/19   CBC auto diff   Result Value Ref Range    WBC 7.9 4.0 - 10.5 K/CU MM    RBC 4.47 4.2 - 5.4 M/CU MM    Hemoglobin 13.1 12.5 -

## 2019-04-18 PROCEDURE — 93010 ELECTROCARDIOGRAM REPORT: CPT | Performed by: INTERNAL MEDICINE

## 2019-04-22 LAB
EKG ATRIAL RATE: 53 BPM
EKG DIAGNOSIS: NORMAL
EKG P AXIS: 50 DEGREES
EKG P-R INTERVAL: 136 MS
EKG Q-T INTERVAL: 430 MS
EKG QRS DURATION: 86 MS
EKG QTC CALCULATION (BAZETT): 403 MS
EKG R AXIS: 23 DEGREES
EKG T AXIS: 34 DEGREES
EKG VENTRICULAR RATE: 53 BPM

## 2019-04-22 NOTE — PROCEDURES
[address] [place] [address]    PATIENT NAME: Julia Fong                     :        1970  MED REC NO:   [id number]                          ROOM:       ED18  ACCOUNT NO:   [de-identified]                           ADMIT DATE: 2019  PROVIDER:     Lizbeth Valdez MD    HOLTER MONITOR 24-HOURS    DATE OF STUDY:  2019    INDICATION:  Tachycardia. This is a 24-hour Holter monitor. Minimum heart rate is 45 beats per  minute. Average heart rate is 65 beats per minute. Maximum heart rate  is 119 beats per minute. The longest R-R interval is 1.5 seconds. The  patient's entire rhythm is sinus rhythm. The patient with a maximum  rate of 119, sinus tachycardia noted. The patient has isolated PVCs and  PACs noted. The longest run of short SVT of 4 beats present, but no  sustained supraventricular or ventricular arrhythmia present. IMPRESSION:  1. Sinus rhythm. 2.  Sinus tachycardia. 3.  Isolated PVCs. 4.  Short run of SVT noted. The longest was 4 beats present.         Patel Simpson MD    D: 2019 15:11:26       T: 2019 16:42:24     [email or web address]  Job#: [id number]     Doc#: [id number]    CC:

## 2019-04-23 LAB
ACQUISITION DURATION: NORMAL S
AVERAGE HEART RATE: 65 BPM
EKG DIAGNOSIS: NORMAL
FASTEST SUPRAVENTRICULAR RATE: 128 BPM
HOOKUP DATE: NORMAL
HOOKUP TIME: NORMAL
LONGEST SUPRAVENTRICULAR RATE: 82 BPM
MAX HEART RATE TIME/DATE: NORMAL
MAX HEART RATE: 119 BPM
MIN HEART RATE TIME/DATE: NORMAL
MIN HEART RATE: 45 BPM
NUMBER OF FASTEST SUPRAVENTRICULAR BEATS: 3
NUMBER OF LONGEST SUPRAVENTRICULAR BEATS: 4
NUMBER OF QRS COMPLEXES: NORMAL
NUMBER OF SUPRAVENTRICULAR BEATS IN RUNS: 7
NUMBER OF SUPRAVENTRICULAR COUPLETS: 0
NUMBER OF SUPRAVENTRICULAR ECTOPICS: 33
NUMBER OF SUPRAVENTRICULAR ISOLATED BEATS: 26
NUMBER OF SUPRAVENTRICULAR RUNS: 2
NUMBER OF VENTRICULAR BEATS IN RUNS: 0
NUMBER OF VENTRICULAR BIGEMINAL CYCLES: 0
NUMBER OF VENTRICULAR COUPLETS: 0
NUMBER OF VENTRICULAR ECTOPICS: 305
NUMBER OF VENTRICULAR ISOLATED BEATS: 305
NUMBER OF VENTRICULAR RUNS: 0

## 2019-04-24 ENCOUNTER — OFFICE VISIT (OUTPATIENT)
Dept: FAMILY MEDICINE CLINIC | Age: 49
End: 2019-04-24
Payer: COMMERCIAL

## 2019-04-24 VITALS
BODY MASS INDEX: 37.56 KG/M2 | HEART RATE: 62 BPM | SYSTOLIC BLOOD PRESSURE: 100 MMHG | WEIGHT: 247.8 LBS | HEIGHT: 68 IN | DIASTOLIC BLOOD PRESSURE: 80 MMHG

## 2019-04-24 DIAGNOSIS — R00.2 PALPITATIONS: Primary | ICD-10-CM

## 2019-04-24 DIAGNOSIS — F33.1 MODERATE EPISODE OF RECURRENT MAJOR DEPRESSIVE DISORDER (HCC): ICD-10-CM

## 2019-04-24 PROCEDURE — 99213 OFFICE O/P EST LOW 20 MIN: CPT | Performed by: FAMILY MEDICINE

## 2019-04-24 RX ORDER — SERTRALINE HYDROCHLORIDE 100 MG/1
100 TABLET, FILM COATED ORAL DAILY
Qty: 30 TABLET | Refills: 11 | Status: SHIPPED | OUTPATIENT
Start: 2019-04-24 | End: 2020-03-10 | Stop reason: SDUPTHER

## 2019-04-24 RX ORDER — TRIAMTERENE AND HYDROCHLOROTHIAZIDE 37.5; 25 MG/1; MG/1
1 CAPSULE ORAL DAILY
Qty: 30 CAPSULE | Refills: 2 | Status: SHIPPED | OUTPATIENT
Start: 2019-04-24 | End: 2019-08-20 | Stop reason: SDUPTHER

## 2019-04-24 NOTE — PROGRESS NOTES
Patient ID: Martha Palacio 1970    . Chief Complaint   Patient presents with    ED Follow-up    Results     holter monitor    Palpitations         HPI   Palpitations: Still having palpitations. Not as badly as before. But still daily. Worried because multiple family members with heart disease. Denies excessive anxiety. Palpitations ongoing for over a week now. Review of Systems   Neurological: Negative for headaches. Patient Active Problem List   Diagnosis    History of breast cancer    Hyperlipidemia    Headache    Asthma    Impaired glucose tolerance    Essential hypertension    Left foot pain    Obesity, Class III, BMI 40-49.9 (morbid obesity) ([address]    Chronic pain of left lower extremity       Past Medical History:   Diagnosis Date    Asthma     History of breast cancer     HTN (hypertension) 2013    Hyperlipidemia 10/2012    Migraines        Past Surgical History:   Procedure Laterality Date    BREAST BIOPSY      COLONOSCOPY      polyp. Repeat in     COLONOSCOPY  2016    diverticulosis    HYSTERECTOMY      Cysts, Endometriosis.   1 ovary remains    TONSILLECTOMY AND ADENOIDECTOMY      TUBAL LIGATION         Family History   Problem Relation Age of Onset    Hypertension Father     Stroke Maternal Aunt     Diabetes Paternal Aunt     Diabetes Paternal Uncle     Stroke Maternal Grandmother     Depression Son     Asthma Son     Stroke Mother 79         in    Fred Vu Sister                Current Outpatient Medications on File Prior to Visit   Medication Sig Dispense Refill    atorvastatin (LIPITOR) 80 MG tablet Take 1 tablet by mouth daily 30 tablet 5    QVAR REDIHALER 80 MCG/ACT AERB inhaler Inhale 2 puffs into the lungs 2 times daily 1 Inhaler 5    metoprolol succinate (TOPROL XL) 100 MG extended release tablet Take 1 tablet by mouth daily 30 tablet 5    albuterol sulfate  (90 Base) MCG/ACT inhaler Inhale 2 puffs into the lungs every 6 hours as needed for Wheezing or Shortness of Breath 1 Inhaler 2    SUMAtriptan (IMITREX) 100 MG tablet Take 1 tablet by mouth once as needed for Migraine 9 tablet 11    Elastic Bandages & Supports (TENNIS ELBOW NEOPRENE BRACE) MISC Wear daily (Patient taking differently: Wear daily) 1 each 0     Current Facility-Administered Medications on File Prior to Visit   Medication Dose Route Frequency Provider Last Rate Last Dose    promethazine (PHENERGAN) injection 25 mg  25 mg Intramuscular Q6H PRN Syeda Handley MD   25 mg at 03/25/14 1541                   Objective:     Physical Exam   Constitutional: She appears well-developed and well-nourished. Obese   HENT:   Head: Normocephalic and atraumatic. Neck: Neck supple. Cardiovascular: Normal rate, regular rhythm, S1 normal, S2 normal and normal heart sounds. Pulmonary/Chest: Effort normal and breath sounds normal. No respiratory distress. She has no wheezes. Neurological: She is alert. Skin: Skin is warm, dry and intact. Psychiatric: She has a normal mood and affect. Nursing note and vitals reviewed. Vitals:    04/24/19 1542 04/24/19 1549   BP: (!) 140/80 100/80   Pulse: 62    Weight: 247 lb 12.8 oz (112.4 kg)    Height: 5' 8\" (1.727 m)      Body mass index is 37.68 kg/m². Wt Readings from Last 3 Encounters:   04/24/19 247 lb 12.8 oz (112.4 kg)   04/17/19 252 lb (114.3 kg)   04/17/19 252 lb 3.2 oz (114.4 kg)     BP Readings from Last 3 Encounters:   04/24/19 100/80   04/17/19 124/82   04/17/19 104/80          No results found for this visit on 04/24/19.   The 10-year ASCVD risk score (Anthony Cheung et al., 2013) is: 1.5%    Values used to calculate the score:      Age: 51 years      Sex: Female      Is Non- : Yes      Diabetic: No      Tobacco smoker: No      Systolic Blood Pressure: 125 mmHg      Is BP treated: Yes      HDL Cholesterol: 49 mg/dL      Total Cholesterol: 218 mg/dL  Lab Review Admission on 04/17/2019, Discharged on 04/17/2019   Component Date Value    Ventricular Rate 04/17/2019 53     Atrial Rate 04/17/2019 53     P-R Interval 04/17/2019 136     QRS Duration 04/17/2019 86     Q-T Interval 04/17/2019 430     QTc Calculation (Bazett) 04/17/2019 403     P Axis 04/17/2019 50     R Axis 04/17/2019 23     T Axis 04/17/2019 34     Diagnosis 04/17/2019                      Value:Sinus bradycardia  Otherwise normal ECG  When compared with ECG of 03-SEP-2017 07:19,  QT has shortened  Confirmed by Jasmyne Knott MD, Sabrina Craft ([id number]) on 4/18/2019 5:01:58 AM      WBC 04/17/2019 7.9     RBC 04/17/2019 4.47     Hemoglobin 04/17/2019 13.1     Hematocrit 04/17/2019 42.6     MCV 04/17/2019 95.3     MCH 04/17/2019 29.3     MCHC 04/17/2019 30.8*    RDW 04/17/2019 13.5     Platelets 12/86/6939 267     MPV 04/17/2019 10.6     Differential Type 04/17/2019 AUTOMATED DIFFERENTIAL     Segs Relative 04/17/2019 61.5     Lymphocytes % 04/17/2019 30.1     Monocytes % 04/17/2019 6.0*    Eosinophils % 04/17/2019 1.4     Basophils % 04/17/2019 0.9     Segs Absolute 04/17/2019 4.9     Lymphocytes # 04/17/2019 2.4     Monocytes # 04/17/2019 0.5     Eosinophils # 04/17/2019 0.1     Basophils # 04/17/2019 0.1     Nucleated RBC % 04/17/2019 0.0     Total Nucleated RBC 04/17/2019 0.0     Total Immature Neutrophil 04/17/2019 0.01     Immature Neutrophil % 04/17/2019 0.1     Rejected Test 04/17/2019 CMPR TROT     Reason for Rejection 04/17/2019 HEMOLYZED     Sodium 04/17/2019 137     Potassium 04/17/2019 3.8     Chloride 04/17/2019 101     CO2 04/17/2019 25     BUN 04/17/2019 8     CREATININE 04/17/2019 0.7     Glucose 04/17/2019 94     Calcium 04/17/2019 9.8     Alb 04/17/2019 4.3     Total Protein 04/17/2019 7.7     Total Bilirubin 04/17/2019 1.0     ALT 04/17/2019 20     AST 04/17/2019 20     Alkaline Phosphatase 04/17/2019 73     GFR Non- 04/17/2019 >60  GFR  04/17/2019 >60     Anion Gap 04/17/2019 11     Troponin T 04/17/2019 <0.010     Hookup Date 04/17/2019 Apr 17 2019      Hookup Time 04/17/2019 15:08:00     Acquisition Duration 04/17/2019 76393     Number Of QRS Complexes 04/17/2019 75935     Number Of Ventricular Ec* 04/17/2019 305     Number Of Ventricular Is* 04/17/2019 305     Number Of Ventricular Bi* 04/17/2019 0     Number Of Ventricular Co* 04/17/2019 0     Number Of Ventricular Ru* 04/17/2019 0     Number Of Ventricular Be* 04/17/2019 0     Number Of Superventricul* 04/17/2019 33     Number Of Suptaventricul* 04/17/2019 26     Number Of Supraventricul* 04/17/2019 0     Number Of Supraventricul* 04/17/2019 2     Number Of Supraventricul* 04/17/2019 7     Number Of Longest Suprav* 04/17/2019 4     Longest Supraventricular* 04/17/2019 82     Number Of Fastest Suprav* 04/17/2019 3     Fastest Supraventricular* 04/17/2019 128     Average Heart Rate 04/17/2019 65     Max Heart Rate 04/17/2019 119     Min Heart Rate 04/17/2019 45     Max Heart Rate Time/Date 04/17/2019 [id number]     Min Heart Rate Time/Date 04/17/2019 [id number]     Diagnosis 04/17/2019                      Value:dictated -- [id number]  Confirmed by SAVANNA MOBLEY, Tomasa Kirkland ([id number]) on 4/21/2019 3:11:30 PM             Assessment:       Diagnosis Orders   1. Palpitations  Cardiac event monitor   2. Moderate episode of recurrent major depressive disorder (HCC)  sertraline (ZOLOFT) 100 MG tablet           Plan:      Event monitor.   Has appt to see cardiology in 3 weeks

## 2019-04-24 NOTE — LETTER
[facility] [address]  Phone: [phone]  Fax: [phone]    Leah Hubbard MD        April 24, 2019     Patient: Lisa Foreman   YOB: 1970   Date of Visit: 4/24/2019       To Whom it May Concern:    Maurice Remy was seen in my clinic on 4/24/2019. She may return to work on 4/25/19 without restrictions. If you have any questions or concerns, please don't hesitate to call.     Sincerely,         Leah Hubbard MD

## 2019-04-29 ENCOUNTER — TELEPHONE (OUTPATIENT)
Dept: FAMILY MEDICINE CLINIC | Age: 49
End: 2019-04-29

## 2019-05-06 ENCOUNTER — TELEPHONE (OUTPATIENT)
Dept: FAMILY MEDICINE CLINIC | Age: 49
End: 2019-05-06

## 2019-06-06 ENCOUNTER — TELEPHONE (OUTPATIENT)
Dept: FAMILY MEDICINE CLINIC | Age: 49
End: 2019-06-06

## 2019-06-06 NOTE — TELEPHONE ENCOUNTER
Patient received my chart results from ted in chart 4/17/19 has not been reviewed. Does she need to make an appointment?  Or can you review

## 2019-06-07 NOTE — TELEPHONE ENCOUNTER
There was no evidence of heart palpitations. In fact, it showed that the heart rate at times was slow, and we know this is due to her blood pressure medication. The feeling of having heart palpitations is most likely the result of anxiety; She can f/u if needed for the treatment of anxiety.

## 2019-06-12 ENCOUNTER — OFFICE VISIT (OUTPATIENT)
Dept: FAMILY MEDICINE CLINIC | Age: 49
End: 2019-06-12
Payer: COMMERCIAL

## 2019-06-12 VITALS
BODY MASS INDEX: 38.07 KG/M2 | WEIGHT: 251.2 LBS | HEIGHT: 68 IN | HEART RATE: 61 BPM | TEMPERATURE: 98.3 F | SYSTOLIC BLOOD PRESSURE: 127 MMHG | DIASTOLIC BLOOD PRESSURE: 72 MMHG

## 2019-06-12 DIAGNOSIS — R39.9 UTI SYMPTOMS: Primary | ICD-10-CM

## 2019-06-12 PROBLEM — R07.89 RIGHT-SIDED CHEST WALL PAIN: Status: ACTIVE | Noted: 2019-06-12

## 2019-06-12 LAB
BILIRUBIN, POC: NEGATIVE
BLOOD URINE, POC: NORMAL
CLARITY, POC: CLEAR
COLOR, POC: NORMAL
GLUCOSE URINE, POC: NEGATIVE
KETONES, POC: NEGATIVE
LEUKOCYTE EST, POC: NEGATIVE
NITRITE, POC: NORMAL
PH, POC: 8.5
PROTEIN, POC: NORMAL
SPECIFIC GRAVITY, POC: 0.02
UROBILINOGEN, POC: NORMAL

## 2019-06-12 PROCEDURE — 81002 URINALYSIS NONAUTO W/O SCOPE: CPT | Performed by: FAMILY MEDICINE

## 2019-06-12 PROCEDURE — 99213 OFFICE O/P EST LOW 20 MIN: CPT | Performed by: FAMILY MEDICINE

## 2019-06-12 RX ORDER — SULFAMETHOXAZOLE AND TRIMETHOPRIM 800; 160 MG/1; MG/1
1 TABLET ORAL 2 TIMES DAILY
Qty: 10 TABLET | Refills: 0 | Status: SHIPPED | OUTPATIENT
Start: 2019-06-12 | End: 2019-06-17

## 2019-06-12 NOTE — PROGRESS NOTES
Patient ID: Brooke Zafar 1970    Chief Complaint   Patient presents with    Urinary Tract Infection     x's 1d,tinkle,not normal flow, urge to urinate doesn't go away    Abdominal Pain     Right side         HPI UTI: see above    Review of Systems   Constitutional: Negative for chills, diaphoresis and fever. Patient Active Problem List   Diagnosis    History of breast cancer    Hyperlipidemia    Headache    Asthma    [facility] of right [facility] breast ([facility] [address]    Impaired glucose tolerance    Essential hypertension    Left foot pain    Obesity, Class III, BMI 40-49.9 (morbid obesity) ([facility] [address]    Chronic pain of left lower extremity    Right-sided chest wall pain       Past Medical History:   Diagnosis Date    Asthma     History of breast cancer     HTN (hypertension) 2013    Hyperlipidemia 10/2012    Migraines        Past Surgical History:   Procedure Laterality Date    BREAST BIOPSY      COLONOSCOPY      polyp. Repeat in     COLONOSCOPY  2016    diverticulosis    HYSTERECTOMY      Cysts, Endometriosis.   1 ovary remains    TONSILLECTOMY AND ADENOIDECTOMY      TUBAL LIGATION         Family History   Problem Relation Age of Onset    Hypertension Father     Stroke Maternal Aunt     Diabetes Paternal Aunt     Diabetes Paternal Uncle     Stroke Maternal Grandmother     Depression Son     Asthma Son     Stroke Mother 79         in 2017   Omayra Davis Sister                Current Outpatient Medications on File Prior to Visit   Medication Sig Dispense Refill    sertraline (ZOLOFT) 100 MG tablet Take 1 tablet by mouth daily 30 tablet 11    triamterene-hydrochlorothiazide (DYAZIDE) 37.5-25 MG per capsule Take 1 capsule by mouth daily 30 capsule 2    atorvastatin (LIPITOR) 80 MG tablet Take 1 tablet by mouth daily 30 tablet 5    QVAR REDIHALER 80 MCG/ACT AERB inhaler Inhale 2 puffs into the lungs 2 times daily 1 Inhaler 5    Elastic Bandages & Supports (TENNIS ELBOW NEOPRENE BRACE) MISC Wear daily (Patient taking differently: Wear daily) 1 each 0    SUMAtriptan (IMITREX) 100 MG tablet Take 1 tablet by mouth once as needed for Migraine 9 tablet 11    metoprolol succinate (TOPROL XL) 100 MG extended release tablet Take 1 tablet by mouth daily 30 tablet 5    albuterol sulfate  (90 Base) MCG/ACT inhaler Inhale 2 puffs into the lungs every 6 hours as needed for Wheezing or Shortness of Breath 1 Inhaler 2     Current Facility-Administered Medications on File Prior to Visit   Medication Dose Route Frequency Provider Last Rate Last Dose    promethazine (PHENERGAN) injection 25 mg  25 mg Intramuscular Q6H PRN Cleo Ring MD   25 mg at 03/25/14 1541                   Objective:           Physical Exam   Constitutional: She appears well-developed and well-nourished. HENT:   Head: Normocephalic and atraumatic. Neck: Neck supple. No tracheal deviation present. No thyromegaly present. Cardiovascular: Normal rate, regular rhythm and normal heart sounds. Pulmonary/Chest: Effort normal and breath sounds normal. No respiratory distress. She has no wheezes. Abdominal: Soft. She exhibits no distension and no mass. There is tenderness in the right lower quadrant. There is no CVA tenderness. Musculoskeletal: She exhibits no edema. Neurological: She is alert. Skin: Skin is warm, dry and intact. Psychiatric: She has a normal mood and affect. Nursing note and vitals reviewed. Vitals:    06/12/19 1339   BP: 127/72   Site: Left Upper Arm   Position: Sitting   Cuff Size: Medium Adult   Pulse: 61   Temp: 98.3 °F (36.8 °C)   Weight: 251 lb 3.2 oz (113.9 kg)   Height: 5' 8\" (1.727 m)     Body mass index is 38.19 kg/m².      Wt Readings from Last 3 Encounters:   06/12/19 251 lb 3.2 oz (113.9 kg)   06/11/19 252 lb (114.3 kg)   04/24/19 247 lb 12.8 oz (112.4 kg)     BP Readings from Last 3 Encounters:   06/12/19 127/72   06/11/19 118/72

## 2019-06-15 LAB
ORGANISM: ABNORMAL
URINE CULTURE, ROUTINE: ABNORMAL
URINE CULTURE, ROUTINE: ABNORMAL

## 2019-06-17 RX ORDER — NITROFURANTOIN 25; 75 MG/1; MG/1
100 CAPSULE ORAL 2 TIMES DAILY
Qty: 14 CAPSULE | Refills: 0 | Status: SHIPPED | OUTPATIENT
Start: 2019-06-17 | End: 2019-06-24

## 2019-08-07 ENCOUNTER — OFFICE VISIT (OUTPATIENT)
Dept: ORTHOPEDIC SURGERY | Age: 49
End: 2019-08-07
Payer: COMMERCIAL

## 2019-08-07 VITALS
WEIGHT: 246.8 LBS | BODY MASS INDEX: 38.74 KG/M2 | HEIGHT: 67 IN | RESPIRATION RATE: 14 BRPM | OXYGEN SATURATION: 97 % | HEART RATE: 78 BPM

## 2019-08-07 DIAGNOSIS — M77.11 LATERAL EPICONDYLITIS OF RIGHT ELBOW: Primary | ICD-10-CM

## 2019-08-07 PROCEDURE — 99212 OFFICE O/P EST SF 10 MIN: CPT | Performed by: PHYSICIAN ASSISTANT

## 2019-08-07 ASSESSMENT — ENCOUNTER SYMPTOMS
EYES NEGATIVE: 1
GASTROINTESTINAL NEGATIVE: 1
BACK PAIN: 1

## 2019-08-07 NOTE — PROGRESS NOTES
Review of Systems   Constitutional: Negative. HENT: Negative. Eyes: Negative. Cardiovascular: Negative. Gastrointestinal: Negative. Genitourinary: Negative. Musculoskeletal: Positive for back pain and myalgias. Negative for neck pain. Skin: Negative. Neurological: Negative. Endo/Heme/Allergies: Positive for environmental allergies. Psychiatric/Behavioral: The patient is nervous/anxious. All other systems reviewed and are negative. I reviewed and agree with the portions of the HPI, review of systems, vital documentation and plan performed by my staff and have added/addended where appropriate. HPI: Mayo Kaur is a 41-year-old female who presents to the office today for a follow up of right lateral elbow pain. Pain remains as a constant, aching, sharp pain , radiating down the right forearm. Previously treated with  corticosteroid injection for lateral epicondylitis which did provide good relief for several weeks with soreness but patient wore elbow brace which seemed to decrease the ache. Pain returned about a month ago and numbness within the right hand and loss of strength. Last injection was administered in October by Viki MENDOZA. Patient wishes to discuss other treatment options for elbow pain. The patient's past medical history, medications, allergies,  family history, social history, and review of systems have been reviewed and are recorded in the chart. There are not any recent changes in their medical history. Physical Exam:  Vitals  Pulse: 78  Resp: 14  SpO2: 97 %  Height: 5' 7\" (170.2 cm)  Weight: 246 lb 12.8 oz (111.9 kg)  Ms. Stone Hollins appears well, she is in no apparent distress, she demonstrates appropriate mood & affect. Gait is normal.  Patient is well-nourished, non-toxic appearing. Body habitus is obese    Gen/Psych:Examination reveals a pleasant individual in no acute distress. The patient is oriented to time, place and person.   The patient's mood and affect are appropriate. Patient appears well nourished. Body habitus is normal obese. Head: Head is atraumatic normocephalic,  ears are symmetric,     Eyes: Eyes show equal pupils bilaterally, extraocular muscles intact    Ears:  Hearing is intact    Nose: Nares are patent bilaterally, no epistaxis,no rhinorrhea     Lymph:  No obvious lymphedema in bilateral upper extremities     Skin intact in bilateral upper extremities with no ulcerations, lesions, rash, erythema. Vascular: There are no varicosities in bilateral upper  extremities, sensation intact to light touch over bilateral upper extremities. elbow exam: Right  Inspection: No erythema, edema, or gross deformity   Carrying angle: 3 degrees valgus  Palpation: Tenderness to palpation over the proximal forearm, extensor tendon, and lateral condyle of the humerus  Range of motion:   Extension: 3 Degree flexion contracture   Flexion: 115 degrees   Supination: 80 degrees   Pronation: 80 degrees  Positive Cozens sign    Prior x-rays reviewed showed no acute abnormality of the right elbow with no significant degenerative changes.          Impression:  right lateral epicondylitis-recurrent    Plan:  Patient has had 3 injections for tennis elbow, tried home exercises and an armband and at this point is seeking further treatment outside of injection  Fitted with right arm band for tennis elbow  Therapy ordered  Take steroid as prescribed  MRI of right elbow ordered  Follow-up after MRI

## 2019-08-12 ENCOUNTER — TELEPHONE (OUTPATIENT)
Dept: ORTHOPEDIC SURGERY | Age: 49
End: 2019-08-12

## 2019-08-13 RX ORDER — PREDNISONE 20 MG/1
20 TABLET ORAL 2 TIMES DAILY
Qty: 10 TABLET | Refills: 0 | Status: SHIPPED | OUTPATIENT
Start: 2019-08-13 | End: 2019-08-18

## 2019-08-15 ENCOUNTER — HOSPITAL ENCOUNTER (OUTPATIENT)
Dept: MRI IMAGING | Age: 49
Discharge: HOME | End: 2019-08-15
Payer: COMMERCIAL

## 2019-08-15 DIAGNOSIS — M77.11 LATERAL EPICONDYLITIS OF RIGHT ELBOW: ICD-10-CM

## 2019-08-15 PROCEDURE — 73221 MRI JOINT UPR EXTREM W/O DYE: CPT

## 2019-08-20 ENCOUNTER — OFFICE VISIT (OUTPATIENT)
Dept: FAMILY MEDICINE CLINIC | Age: 49
End: 2019-08-20
Payer: COMMERCIAL

## 2019-08-20 ENCOUNTER — TELEPHONE (OUTPATIENT)
Dept: ORTHOPEDIC SURGERY | Age: 49
End: 2019-08-20

## 2019-08-20 VITALS
HEART RATE: 56 BPM | HEIGHT: 67 IN | WEIGHT: 247.8 LBS | SYSTOLIC BLOOD PRESSURE: 105 MMHG | DIASTOLIC BLOOD PRESSURE: 70 MMHG | BODY MASS INDEX: 38.89 KG/M2

## 2019-08-20 DIAGNOSIS — Z12.31 ENCOUNTER FOR SCREENING MAMMOGRAM FOR MALIGNANT NEOPLASM OF BREAST: Primary | ICD-10-CM

## 2019-08-20 DIAGNOSIS — I10 ESSENTIAL HYPERTENSION: ICD-10-CM

## 2019-08-20 DIAGNOSIS — R73.02 IMPAIRED GLUCOSE TOLERANCE: ICD-10-CM

## 2019-08-20 DIAGNOSIS — E78.5 HYPERLIPIDEMIA, UNSPECIFIED HYPERLIPIDEMIA TYPE: ICD-10-CM

## 2019-08-20 DIAGNOSIS — J45.20 MILD INTERMITTENT ASTHMA WITHOUT COMPLICATION: ICD-10-CM

## 2019-08-20 LAB
A/G RATIO: 1.5 (ref 1.1–2.2)
ALBUMIN SERPL-MCNC: 4.5 G/DL (ref 3.4–5)
ALP BLD-CCNC: 80 U/L (ref 40–129)
ALT SERPL-CCNC: 15 U/L (ref 10–40)
ANION GAP SERPL CALCULATED.3IONS-SCNC: 13 MMOL/L (ref 3–16)
AST SERPL-CCNC: 14 U/L (ref 15–37)
BILIRUB SERPL-MCNC: 0.4 MG/DL (ref 0–1)
BUN BLDV-MCNC: 13 MG/DL (ref 7–20)
CALCIUM SERPL-MCNC: 10 MG/DL (ref 8.3–10.6)
CHLORIDE BLD-SCNC: 100 MMOL/L (ref 99–110)
CHOLESTEROL, TOTAL: 291 MG/DL (ref 0–199)
CO2: 26 MMOL/L (ref 21–32)
CREAT SERPL-MCNC: 0.6 MG/DL (ref 0.6–1.1)
GFR AFRICAN AMERICAN: >60
GFR NON-AFRICAN AMERICAN: >60
GLOBULIN: 3 G/DL
GLUCOSE BLD-MCNC: 99 MG/DL (ref 70–99)
HDLC SERPL-MCNC: 64 MG/DL (ref 40–60)
LDL CHOLESTEROL CALCULATED: 200 MG/DL
POTASSIUM SERPL-SCNC: 4.1 MMOL/L (ref 3.5–5.1)
SODIUM BLD-SCNC: 139 MMOL/L (ref 136–145)
TOTAL PROTEIN: 7.5 G/DL (ref 6.4–8.2)
TRIGL SERPL-MCNC: 135 MG/DL (ref 0–150)
VLDLC SERPL CALC-MCNC: 27 MG/DL

## 2019-08-20 PROCEDURE — 99214 OFFICE O/P EST MOD 30 MIN: CPT | Performed by: FAMILY MEDICINE

## 2019-08-20 PROCEDURE — 36415 COLL VENOUS BLD VENIPUNCTURE: CPT | Performed by: FAMILY MEDICINE

## 2019-08-20 RX ORDER — ATORVASTATIN CALCIUM 80 MG/1
80 TABLET, FILM COATED ORAL DAILY
Qty: 30 TABLET | Refills: 5 | Status: SHIPPED | OUTPATIENT
Start: 2019-08-20 | End: 2020-03-10 | Stop reason: SDUPTHER

## 2019-08-20 RX ORDER — TRIAMTERENE AND HYDROCHLOROTHIAZIDE 37.5; 25 MG/1; MG/1
1 CAPSULE ORAL DAILY
Qty: 30 CAPSULE | Refills: 5 | Status: SHIPPED | OUTPATIENT
Start: 2019-08-20 | End: 2020-03-10 | Stop reason: SDUPTHER

## 2019-08-20 RX ORDER — METOPROLOL SUCCINATE 100 MG/1
100 TABLET, EXTENDED RELEASE ORAL DAILY
Qty: 30 TABLET | Refills: 5 | Status: SHIPPED | OUTPATIENT
Start: 2019-08-20 | End: 2020-03-10 | Stop reason: SDUPTHER

## 2019-08-20 RX ORDER — BECLOMETHASONE DIPROPIONATE HFA 80 UG/1
2 AEROSOL, METERED RESPIRATORY (INHALATION) 2 TIMES DAILY
Qty: 1 INHALER | Refills: 5 | Status: SHIPPED | OUTPATIENT
Start: 2019-08-20 | End: 2020-03-10 | Stop reason: SDUPTHER

## 2019-08-20 ASSESSMENT — ENCOUNTER SYMPTOMS: SHORTNESS OF BREATH: 1

## 2019-08-20 NOTE — LETTER
[facility]  [address]  Phone: [phone]  Fax: [phone]    Delena Meckel, MD        August 20, 2019     Patient: Chanelle Garnicar   YOB: 1970   Date of Visit: 8/20/2019       To Whom it May Concern:    Edwige Remy was seen in my clinic on 8/20/2019. She may return to work on 8/20/19. If you have any questions or concerns, please don't hesitate to call.     Sincerely,         Delena Meckel, MD

## 2019-08-20 NOTE — PROGRESS NOTES
Are you taking prescription, herbal, or over the counter medications to help with your mood ? Yes    Are you having any side effects with your medication? No    If on medications, are you taking your medications daily? Yes    Are you working with a psychologist / psychiatrist?  No    Have you felt your symptoms are better, worse, or unchanged since your last visit   unchanged    Do you want to discuss changes to your treatment?  No

## 2019-08-20 NOTE — TELEPHONE ENCOUNTER
Impression   1. Common extensor tendinosis with high-grade partial insertional tear. Pt called to schedule an appointment for MRI results.  She she schedule with PA or Dr. Carlos Jackson

## 2019-08-20 NOTE — PROGRESS NOTES
puffs into the lungs every 6 hours as needed for Wheezing or Shortness of Breath (Patient not taking: Reported on 8/20/2019) 1 Inhaler 2     Current Facility-Administered Medications on File Prior to Visit   Medication Dose Route Frequency Provider Last Rate Last Dose    promethazine (PHENERGAN) injection 25 mg  25 mg Intramuscular Q6H PRN Linda Solomon MD   25 mg at 03/25/14 1541                   Objective:         Physical Exam   Constitutional: She is oriented to person, place, and time. She appears well-developed and well-nourished. No distress. HENT:   Head: Normocephalic and atraumatic. Neck: Neck supple. No tracheal deviation present. No thyromegaly present. Pulmonary/Chest: Right breast exhibits no inverted nipple, no mass, no nipple discharge, no skin change and no tenderness. Left breast exhibits no inverted nipple, no mass, no nipple discharge, no skin change and no tenderness. Breasts are symmetrical.   Lymphadenopathy:     She has no axillary adenopathy. Neurological: She is alert and oriented to person, place, and time. .     Skin: Skin is warm, dry and intact. Psychiatric: She has a normal mood and affect. Her speech is normal and behavior is normal. Judgment and thought content normal. She is not actively hallucinating. Cognition and memory are normal. She is attentive. Nursing note and vitals reviewed. Vitals:    08/20/19 0820 08/20/19 0831   BP: 110/68 105/70   Site: Left Upper Arm    Position: Sitting    Cuff Size: Large Adult    Pulse: 56    Weight: 247 lb 12.8 oz (112.4 kg)    Height: 5' 7\" (1.702 m)      Body mass index is 38.81 kg/m². Wt Readings from Last 3 Encounters:   08/20/19 247 lb 12.8 oz (112.4 kg)   08/07/19 246 lb 12.8 oz (111.9 kg)   06/24/19 258 lb (117 kg)     BP Readings from Last 3 Encounters:   08/20/19 105/70   06/24/19 108/70   06/12/19 127/72          No results found for this visit on 08/20/19.   The 10-year ASCVD risk score (Christopher Bautistac., et al., 2013) is: 2%    Values used to calculate the score:      Age: 47 years      Sex: Female      Is Non- : Yes      Diabetic: No      Tobacco smoker: No      Systolic Blood Pressure: 947 mmHg      Is BP treated: Yes      HDL Cholesterol: 49 mg/dL      Total Cholesterol: 218 mg/dL  Lab Review   Office Visit on 06/12/2019   Component Date Value    Color, UA 06/12/2019 straw     Clarity, UA 06/12/2019 clear     Glucose, UA POC 06/12/2019 negative     Bilirubin, UA 06/12/2019 negative     Ketones, UA 06/12/2019 negative     Spec Grav, UA 06/12/2019 0.020     Blood, UA POC 06/12/2019 moderate     pH, UA 06/12/2019 8.5     Protein, UA POC 06/12/2019 30mg/dL     Urobilinogen, UA 06/12/2019 1.0 E.U./dL     Leukocytes, UA 06/12/2019 negative     Nitrite, UA 06/12/2019 small     Organism 06/12/2019 Escherichia coli*    Urine Culture, Routine 06/12/2019 >100,000 CFU/ml    Admission on 04/17/2019, Discharged on 04/17/2019   Component Date Value    Ventricular Rate 04/17/2019 53     Atrial Rate 04/17/2019 53     P-R Interval 04/17/2019 136     QRS Duration 04/17/2019 86     Q-T Interval 04/17/2019 430     QTc Calculation (Bazett) 04/17/2019 403     P Axis 04/17/2019 50     R Axis 04/17/2019 23     T Axis 04/17/2019 34     Diagnosis 04/17/2019                      Value:Sinus bradycardia  Otherwise normal ECG  When compared with ECG of 03-SEP-2017 07:19,  QT has shortened  Confirmed by Sugey Morales MD, Vianney Lyle ([id number]) on 4/18/2019 5:01:58 AM      WBC 04/17/2019 7.9     RBC 04/17/2019 4.47     Hemoglobin 04/17/2019 13.1     Hematocrit 04/17/2019 42.6     MCV 04/17/2019 95.3     MCH 04/17/2019 29.3     MCHC 04/17/2019 30.8*    RDW 04/17/2019 13.5     Platelets 36/52/3699 267     MPV 04/17/2019 10.6     Differential Type 04/17/2019 AUTOMATED DIFFERENTIAL     Segs Relative 04/17/2019 61.5     Lymphocytes % 04/17/2019 30.1     Monocytes % 04/17/2019 6.0*    Eosinophils % 04/17/2019 45     Max Heart Rate Time/Date 04/17/2019 [id number]     Min Heart Rate Time/Date 04/17/2019 [id number]     Diagnosis 04/17/2019                      Value:dictated -- [id number]  Confirmed by SAVANNA MOBLEY, Alfredo Gomez ([id number]) on 4/21/2019 3:11:30 PM             Assessment:       Diagnosis Orders   1. Encounter for screening mammogram for malignant neoplasm of breast  BECKY Screening Bilateral   2. Hyperlipidemia, unspecified hyperlipidemia type  Lipid Panel    Comprehensive Metabolic Panel    atorvastatin (LIPITOR) 80 MG tablet   3. Impaired glucose tolerance  Hemoglobin A1C   4. Essential hypertension  metoprolol succinate (TOPROL XL) 100 MG extended release tablet   5. Mild intermittent asthma without complication  QVAR REDIHALER 80 MCG/ACT AERB inhaler           Plan:        See orders    Asthma doing well. Continue current medication    30 minutes of walking per day or 2 1/2 hours per week of walking or 10,000 steps    HTN stable. Continue current medication    Hyperlipid: patient states compliant. Continue current medications.   Weight loss recommended

## 2019-08-21 LAB
ESTIMATED AVERAGE GLUCOSE: 119.8 MG/DL
HBA1C MFR BLD: 5.8 %

## 2019-08-23 DIAGNOSIS — E78.5 HYPERLIPIDEMIA, UNSPECIFIED HYPERLIPIDEMIA TYPE: Primary | ICD-10-CM

## 2019-08-23 RX ORDER — EZETIMIBE 10 MG/1
10 TABLET ORAL DAILY
Qty: 90 TABLET | Refills: 1 | Status: SHIPPED | OUTPATIENT
Start: 2019-08-23 | End: 2019-12-03

## 2019-08-30 ENCOUNTER — OFFICE VISIT (OUTPATIENT)
Dept: FAMILY MEDICINE CLINIC | Age: 49
End: 2019-08-30
Payer: COMMERCIAL

## 2019-08-30 VITALS
OXYGEN SATURATION: 97 % | BODY MASS INDEX: 38.42 KG/M2 | HEIGHT: 67 IN | TEMPERATURE: 99.1 F | RESPIRATION RATE: 15 BRPM | HEART RATE: 69 BPM | SYSTOLIC BLOOD PRESSURE: 118 MMHG | WEIGHT: 244.8 LBS | DIASTOLIC BLOOD PRESSURE: 80 MMHG

## 2019-08-30 DIAGNOSIS — J20.9 ACUTE BRONCHITIS, UNSPECIFIED ORGANISM: Primary | ICD-10-CM

## 2019-08-30 PROCEDURE — 99213 OFFICE O/P EST LOW 20 MIN: CPT | Performed by: FAMILY MEDICINE

## 2019-08-30 RX ORDER — DOXYCYCLINE HYCLATE 100 MG
100 TABLET ORAL 2 TIMES DAILY
Qty: 14 TABLET | Refills: 0 | Status: SHIPPED | OUTPATIENT
Start: 2019-08-30 | End: 2019-09-06

## 2019-08-30 RX ORDER — BENZONATATE 100 MG/1
100-200 CAPSULE ORAL 3 TIMES DAILY PRN
Qty: 30 CAPSULE | Refills: 0 | Status: SHIPPED | OUTPATIENT
Start: 2019-08-30 | End: 2019-09-06

## 2019-08-30 NOTE — LETTER
[address] [facility]  [address]  Phone: [phone]  Fax: [phone]    Aram Narayan MD        August 30, 2019     Patient: Dirk Rangel   YOB: 1970   Date of Visit: 8/30/2019       To Whom it May Concern:    Mendez Sykes was seen in my clinic on 8/30/2019. She may return to work on 9/3/19. If you have any questions or concerns, please don't hesitate to call.     Sincerely,         Aram Narayan MD

## 2019-09-12 ENCOUNTER — HOSPITAL ENCOUNTER (OUTPATIENT)
Dept: WOMENS IMAGING | Age: 49
Discharge: HOME | End: 2019-09-12
Payer: COMMERCIAL

## 2019-09-12 DIAGNOSIS — Z12.31 ENCOUNTER FOR SCREENING MAMMOGRAM FOR MALIGNANT NEOPLASM OF BREAST: ICD-10-CM

## 2019-09-12 PROCEDURE — 77063 BREAST TOMOSYNTHESIS BI: CPT

## 2019-09-13 ENCOUNTER — OFFICE VISIT (OUTPATIENT)
Dept: FAMILY MEDICINE CLINIC | Age: 49
End: 2019-09-13
Payer: COMMERCIAL

## 2019-09-13 VITALS
SYSTOLIC BLOOD PRESSURE: 120 MMHG | HEART RATE: 56 BPM | BODY MASS INDEX: 39.33 KG/M2 | HEIGHT: 67 IN | WEIGHT: 250.6 LBS | TEMPERATURE: 97.3 F | DIASTOLIC BLOOD PRESSURE: 70 MMHG

## 2019-09-13 DIAGNOSIS — Z00.00 ANNUAL PHYSICAL EXAM: Primary | ICD-10-CM

## 2019-09-13 DIAGNOSIS — N89.8 VAGINAL DISCHARGE: ICD-10-CM

## 2019-09-13 PROCEDURE — 99396 PREV VISIT EST AGE 40-64: CPT | Performed by: FAMILY MEDICINE

## 2019-09-13 NOTE — PROGRESS NOTES
08/20/2019 135     HDL 08/20/2019 64*    LDL Calculated 08/20/2019 200*    VLDL Cholesterol Calcula* 08/20/2019 27     Sodium 08/20/2019 139     Potassium 08/20/2019 4.1     Chloride 08/20/2019 100     CO2 08/20/2019 26     Anion Gap 08/20/2019 13     Glucose 08/20/2019 99     BUN 08/20/2019 13     CREATININE 08/20/2019 0.6     GFR Non- 08/20/2019 >60     GFR  08/20/2019 >60     Calcium 08/20/2019 10.0     Total Protein 08/20/2019 7.5     Alb 08/20/2019 4.5     Albumin/Globulin Ratio 08/20/2019 1.5     Total Bilirubin 08/20/2019 0.4     Alkaline Phosphatase 08/20/2019 80     ALT 08/20/2019 15     AST 08/20/2019 14*    Globulin 08/20/2019 3.0         Assessment/Plan:    Nicky Ames was seen today for annual exam and vaginal discharge. Diagnoses and all orders for this visit:    Annual physical exam    Vaginal discharge  -     Cancel: C.trachomatis N.gonorrhoeae DNA, Urine  -     Cancel: Vaginal Pathogens Probes *A  -     Vaginal Pathogens Probes *A  -     C.trachomatis N.gonorrhoeae DNA        Goal of 5 vegetables and fruits per day or half the plate be vegetable and fruits  2-3 serving of dairy per day.      30 minutes of walking per day or 2 1/2 hours per week of walking or 10,000 steps           Per USPTF guidelines, patient is not needing mammogram at this time

## 2019-09-13 NOTE — PATIENT INSTRUCTIONS
problems digesting milk, try eating cheese or yogurt instead. Limit fats and oils, including those used in cooking. When you do use fats, choose oils that are liquid at room temperature (unsaturated fats). These include canola oil and olive oil. Avoid foods with trans fats, such as many fried foods, cookies, and snack foods. Where can you learn more? Go to https://chpepiceweb.ASSET4. org and sign in to your [employer] account. Enter B798 in the [employer] box to learn more about \"Learning About Dietary Guidelines. \"     If you do not have an account, please click on the \"Sign Up Now\" link. Current as of: November 7, 2018  Content Version: 12.0  © 3655-3821 Healthwise, [employer]. Care instructions adapted under license by [facility] ([facility]). If you have questions about a medical condition or this instruction, always ask your healthcare professional. [place] [address] any warranty or liability for your use of this information. Learning About Eating More Fruits and Vegetables  What are some quick tips for eating more fruits and vegetables? We're all encouraged to eat more fruits and vegetables. Yet it can seem like one more chore on the daily to-do list. But you can add color and crunch to your meals--and lots of nutrition--with these quick tips. · Brighten up your breakfast.  ? Add sliced fruit or frozen berries to your yogurt, pancakes, or cereal.  ? Blend fresh or frozen fruit, veggies, and yogurt with a little fruit juice, and you've got a tasty smoothie. ? Make your scrambled eggs a gourmet treat by adding onions, celery, and bell peppers. ? Bake up some bran muffins with grated carrots added into the mix. · Make a livelier lunch. ? Jazz up tuna or chicken salad with apple chunks, celery, or grapes--or all of them! ? Add cucumbers, avocado slices, tomatoes, and lettuce to your sandwiches.   ? Kick up the flavor of grilled cheese sandwiches with spinach and liability for your use of this information. The diagnoses and medications listed in this after visit summary may not be accurate at the time of check out. Please check MY CHART in 28-48 hours for possible corrections. Late cancellation policy: So that we can better accommodate people who are sick, please give our office 24 hour notice for an appointment cancellation. Thank you. Missed appointments: Your care is very important to us. It is important that you keep your scheduled appointments. Multiple missed appointments may lead to a dismissal from the office. Later arrival policy: If you are more than 10 minutes late for your appointment, you will be asked to reschedule. Please allow 5-7 business days for paperwork to be processed. It is important that you check your MY Chart messages, as they include appointment reminders, test results, and other important information. If you have forgotten your password, please call [phone].

## 2019-09-16 ENCOUNTER — TELEPHONE (OUTPATIENT)
Dept: FAMILY MEDICINE CLINIC | Age: 49
End: 2019-09-16

## 2019-09-16 DIAGNOSIS — N76.0 BACTERIAL VAGINOSIS: Primary | ICD-10-CM

## 2019-09-16 DIAGNOSIS — B37.31 VAGINAL YEAST INFECTION: ICD-10-CM

## 2019-09-16 DIAGNOSIS — B96.89 BACTERIAL VAGINOSIS: Primary | ICD-10-CM

## 2019-09-16 LAB
C TRACH DNA GENITAL QL NAA+PROBE: NEGATIVE
N. GONORRHOEAE DNA: NEGATIVE

## 2019-09-16 RX ORDER — FLUCONAZOLE 150 MG/1
150 TABLET ORAL ONCE
Qty: 1 TABLET | Refills: 0 | Status: SHIPPED | OUTPATIENT
Start: 2019-09-16 | End: 2019-09-16

## 2019-09-16 RX ORDER — METRONIDAZOLE 500 MG/1
500 TABLET ORAL 2 TIMES DAILY
Qty: 14 TABLET | Refills: 0 | Status: SHIPPED | OUTPATIENT
Start: 2019-09-16 | End: 2019-09-23

## 2019-09-16 NOTE — TELEPHONE ENCOUNTER
Showed that you have a bacterial infection which is NOT an STD. I will send an antibiotic for this. Please do not drink any alcohol while you take this medication    Also shows yeast infection. Appropriate medication sent.

## 2019-09-23 ENCOUNTER — OFFICE VISIT (OUTPATIENT)
Dept: ORTHOPEDIC SURGERY | Age: 49
End: 2019-09-23
Payer: COMMERCIAL

## 2019-09-23 VITALS — HEART RATE: 74 BPM | RESPIRATION RATE: 14 BRPM | OXYGEN SATURATION: 96 %

## 2019-09-23 DIAGNOSIS — M77.11 LATERAL EPICONDYLITIS OF RIGHT ELBOW: Primary | ICD-10-CM

## 2019-09-23 PROCEDURE — 99203 OFFICE O/P NEW LOW 30 MIN: CPT | Performed by: ORTHOPAEDIC SURGERY

## 2019-09-23 ASSESSMENT — ENCOUNTER SYMPTOMS
SHORTNESS OF BREATH: 0
COLOR CHANGE: 0

## 2019-09-25 ENCOUNTER — TELEPHONE (OUTPATIENT)
Dept: ORTHOPEDIC SURGERY | Age: 49
End: 2019-09-25

## 2019-09-30 ENCOUNTER — TELEPHONE (OUTPATIENT)
Dept: FAMILY MEDICINE CLINIC | Age: 49
End: 2019-09-30

## 2019-10-02 ENCOUNTER — OFFICE VISIT (OUTPATIENT)
Dept: ORTHOPEDIC SURGERY | Age: 49
End: 2019-10-02
Payer: COMMERCIAL

## 2019-10-02 VITALS — WEIGHT: 245 LBS | HEIGHT: 67 IN | BODY MASS INDEX: 38.45 KG/M2 | RESPIRATION RATE: 16 BRPM

## 2019-10-02 DIAGNOSIS — M77.11 LATERAL EPICONDYLITIS OF RIGHT ELBOW: ICD-10-CM

## 2019-10-02 DIAGNOSIS — M19.041 PRIMARY OSTEOARTHRITIS OF RIGHT HAND: Primary | ICD-10-CM

## 2019-10-02 PROCEDURE — 99212 OFFICE O/P EST SF 10 MIN: CPT | Performed by: PHYSICIAN ASSISTANT

## 2019-10-02 RX ORDER — PREDNISONE 20 MG/1
20 TABLET ORAL 2 TIMES DAILY
Qty: 10 TABLET | Refills: 0 | Status: SHIPPED | OUTPATIENT
Start: 2019-10-02 | End: 2019-10-07

## 2019-10-02 RX ORDER — IBUPROFEN 200 MG
800 TABLET ORAL EVERY 6 HOURS PRN
COMMUNITY
End: 2021-08-26

## 2019-10-02 ASSESSMENT — ENCOUNTER SYMPTOMS
BACK PAIN: 1
EYES NEGATIVE: 1
GASTROINTESTINAL NEGATIVE: 1

## 2019-10-04 ENCOUNTER — TELEPHONE (OUTPATIENT)
Dept: ORTHOPEDIC SURGERY | Age: 49
End: 2019-10-04

## 2019-10-08 ENCOUNTER — TELEPHONE (OUTPATIENT)
Dept: ORTHOPEDIC SURGERY | Age: 49
End: 2019-10-08

## 2019-11-18 ENCOUNTER — OFFICE VISIT (OUTPATIENT)
Dept: FAMILY MEDICINE CLINIC | Age: 49
End: 2019-11-18
Payer: COMMERCIAL

## 2019-11-18 VITALS
RESPIRATION RATE: 20 BRPM | OXYGEN SATURATION: 91 % | HEIGHT: 67 IN | WEIGHT: 254.8 LBS | DIASTOLIC BLOOD PRESSURE: 80 MMHG | TEMPERATURE: 98.5 F | HEART RATE: 71 BPM | BODY MASS INDEX: 39.99 KG/M2 | SYSTOLIC BLOOD PRESSURE: 128 MMHG

## 2019-11-18 DIAGNOSIS — Z01.818 PRE-OP EXAMINATION: Primary | ICD-10-CM

## 2019-11-18 DIAGNOSIS — Z23 NEEDS FLU SHOT: ICD-10-CM

## 2019-11-18 PROCEDURE — 93000 ELECTROCARDIOGRAM COMPLETE: CPT | Performed by: FAMILY MEDICINE

## 2019-11-18 PROCEDURE — 90471 IMMUNIZATION ADMIN: CPT | Performed by: FAMILY MEDICINE

## 2019-11-18 PROCEDURE — 90686 IIV4 VACC NO PRSV 0.5 ML IM: CPT | Performed by: FAMILY MEDICINE

## 2019-11-18 PROCEDURE — 99212 OFFICE O/P EST SF 10 MIN: CPT | Performed by: FAMILY MEDICINE

## 2019-11-25 ENCOUNTER — ANESTHESIA EVENT (OUTPATIENT)
Dept: OPERATING ROOM | Age: 49
End: 2019-11-25
Payer: COMMERCIAL

## 2019-11-25 ENCOUNTER — TELEPHONE (OUTPATIENT)
Dept: ORTHOPEDIC SURGERY | Age: 49
End: 2019-11-25

## 2019-12-03 ENCOUNTER — HOSPITAL ENCOUNTER (OUTPATIENT)
Dept: PREADMISSION TESTING | Age: 49
Discharge: HOME | End: 2019-12-07
Payer: COMMERCIAL

## 2019-12-03 VITALS
BODY MASS INDEX: 38.8 KG/M2 | TEMPERATURE: 97.6 F | WEIGHT: 256 LBS | RESPIRATION RATE: 16 BRPM | DIASTOLIC BLOOD PRESSURE: 65 MMHG | HEIGHT: 68 IN | SYSTOLIC BLOOD PRESSURE: 122 MMHG | OXYGEN SATURATION: 97 % | HEART RATE: 69 BPM

## 2019-12-03 LAB
ANION GAP SERPL CALCULATED.3IONS-SCNC: 13 MMOL/L (ref 4–16)
BUN BLDV-MCNC: 11 MG/DL (ref 6–23)
CALCIUM SERPL-MCNC: 9.9 MG/DL (ref 8.3–10.6)
CHLORIDE BLD-SCNC: 100 MMOL/L (ref 99–110)
CO2: 27 MMOL/L (ref 21–32)
CREAT SERPL-MCNC: 0.7 MG/DL (ref 0.6–1.1)
GFR AFRICAN AMERICAN: >60 ML/MIN/1.73M2
GFR NON-AFRICAN AMERICAN: >60 ML/MIN/1.73M2
GLUCOSE BLD-MCNC: 78 MG/DL (ref 70–99)
HCT VFR BLD CALC: 42.7 % (ref 37–47)
HEMOGLOBIN: 13.5 GM/DL (ref 12.5–16)
MCH RBC QN AUTO: 28.9 PG (ref 27–31)
MCHC RBC AUTO-ENTMCNC: 31.6 % (ref 32–36)
MCV RBC AUTO: 91.4 FL (ref 78–100)
PDW BLD-RTO: 12.9 % (ref 11.7–14.9)
PLATELET # BLD: 277 K/CU MM (ref 140–440)
PMV BLD AUTO: 9.6 FL (ref 7.5–11.1)
POTASSIUM SERPL-SCNC: 3.8 MMOL/L (ref 3.5–5.1)
RBC # BLD: 4.67 M/CU MM (ref 4.2–5.4)
SODIUM BLD-SCNC: 140 MMOL/L (ref 135–145)
WBC # BLD: 6.1 K/CU MM (ref 4–10.5)

## 2019-12-03 PROCEDURE — 36415 COLL VENOUS BLD VENIPUNCTURE: CPT

## 2019-12-03 PROCEDURE — 80048 BASIC METABOLIC PNL TOTAL CA: CPT

## 2019-12-03 PROCEDURE — 85027 COMPLETE CBC AUTOMATED: CPT

## 2019-12-04 DIAGNOSIS — M77.11 LATERAL EPICONDYLITIS OF RIGHT ELBOW: Primary | ICD-10-CM

## 2019-12-04 RX ORDER — HYDROCODONE BITARTRATE AND ACETAMINOPHEN 5; 325 MG/1; MG/1
1 TABLET ORAL EVERY 6 HOURS PRN
Qty: 10 TABLET | Refills: 0 | Status: SHIPPED | OUTPATIENT
Start: 2019-12-04 | End: 2019-12-11

## 2019-12-04 RX ORDER — CEPHALEXIN 250 MG/1
250 CAPSULE ORAL 4 TIMES DAILY
Qty: 4 CAPSULE | Refills: 0 | Status: SHIPPED | OUTPATIENT
Start: 2019-12-04 | End: 2019-12-05

## 2019-12-09 ENCOUNTER — OFFICE VISIT (OUTPATIENT)
Dept: FAMILY MEDICINE CLINIC | Age: 49
End: 2019-12-09
Payer: COMMERCIAL

## 2019-12-09 VITALS
DIASTOLIC BLOOD PRESSURE: 70 MMHG | RESPIRATION RATE: 15 BRPM | BODY MASS INDEX: 39.26 KG/M2 | WEIGHT: 258.2 LBS | OXYGEN SATURATION: 98 % | SYSTOLIC BLOOD PRESSURE: 110 MMHG | HEART RATE: 81 BPM | TEMPERATURE: 98.3 F

## 2019-12-09 DIAGNOSIS — J02.9 SORE THROAT: ICD-10-CM

## 2019-12-09 DIAGNOSIS — J01.00 ACUTE NON-RECURRENT MAXILLARY SINUSITIS: Primary | ICD-10-CM

## 2019-12-09 PROCEDURE — 99213 OFFICE O/P EST LOW 20 MIN: CPT | Performed by: NURSE PRACTITIONER

## 2019-12-09 RX ORDER — AZITHROMYCIN 250 MG/1
250 TABLET, FILM COATED ORAL SEE ADMIN INSTRUCTIONS
Qty: 6 TABLET | Refills: 0 | Status: SHIPPED | OUTPATIENT
Start: 2019-12-09 | End: 2019-12-14

## 2019-12-09 ASSESSMENT — ENCOUNTER SYMPTOMS
RHINORRHEA: 0
SHORTNESS OF BREATH: 0
SINUS PRESSURE: 1
SORE THROAT: 1
NAUSEA: 0
WHEEZING: 0
TROUBLE SWALLOWING: 0
CHEST TIGHTNESS: 0
SINUS PAIN: 0
COUGH: 0

## 2019-12-10 ENCOUNTER — TELEPHONE (OUTPATIENT)
Dept: ORTHOPEDIC SURGERY | Age: 49
End: 2019-12-10

## 2019-12-10 ENCOUNTER — ANESTHESIA (OUTPATIENT)
Dept: OPERATING ROOM | Age: 49
End: 2019-12-10
Payer: COMMERCIAL

## 2019-12-10 ENCOUNTER — HOSPITAL ENCOUNTER (OUTPATIENT)
Age: 49
Setting detail: OUTPATIENT SURGERY
Discharge: HOME | End: 2019-12-10
Attending: ORTHOPAEDIC SURGERY | Admitting: ORTHOPAEDIC SURGERY
Payer: COMMERCIAL

## 2019-12-10 VITALS
HEART RATE: 73 BPM | RESPIRATION RATE: 16 BRPM | WEIGHT: 256 LBS | OXYGEN SATURATION: 97 % | TEMPERATURE: 97 F | BODY MASS INDEX: 38.8 KG/M2 | SYSTOLIC BLOOD PRESSURE: 114 MMHG | DIASTOLIC BLOOD PRESSURE: 65 MMHG | HEIGHT: 68 IN

## 2019-12-10 VITALS
OXYGEN SATURATION: 98 % | RESPIRATION RATE: 6 BRPM | DIASTOLIC BLOOD PRESSURE: 68 MMHG | TEMPERATURE: 97.7 F | SYSTOLIC BLOOD PRESSURE: 101 MMHG

## 2019-12-10 PROCEDURE — 3600000003 HC SURGERY LEVEL 3 BASE: Performed by: ORTHOPAEDIC SURGERY

## 2019-12-10 PROCEDURE — 7100000011 HC PHASE II RECOVERY - ADDTL 15 MIN: Performed by: ORTHOPAEDIC SURGERY

## 2019-12-10 PROCEDURE — 3700000001 HC ADD 15 MINUTES (ANESTHESIA): Performed by: ORTHOPAEDIC SURGERY

## 2019-12-10 PROCEDURE — 2580000003 HC RX 258

## 2019-12-10 PROCEDURE — 2500000003 HC RX 250 WO HCPCS: Performed by: NURSE ANESTHETIST, CERTIFIED REGISTERED

## 2019-12-10 PROCEDURE — 3700000000 HC ANESTHESIA ATTENDED CARE: Performed by: ORTHOPAEDIC SURGERY

## 2019-12-10 PROCEDURE — 6360000002 HC RX W HCPCS: Performed by: ORTHOPAEDIC SURGERY

## 2019-12-10 PROCEDURE — 6370000000 HC RX 637 (ALT 250 FOR IP)

## 2019-12-10 PROCEDURE — 2580000003 HC RX 258: Performed by: ORTHOPAEDIC SURGERY

## 2019-12-10 PROCEDURE — 3600000013 HC SURGERY LEVEL 3 ADDTL 15MIN: Performed by: ORTHOPAEDIC SURGERY

## 2019-12-10 PROCEDURE — 7100000000 HC PACU RECOVERY - FIRST 15 MIN: Performed by: ORTHOPAEDIC SURGERY

## 2019-12-10 PROCEDURE — 7100000010 HC PHASE II RECOVERY - FIRST 15 MIN: Performed by: ORTHOPAEDIC SURGERY

## 2019-12-10 PROCEDURE — 24358 REPAIR ELBOW W/DEB OPEN: CPT | Performed by: ORTHOPAEDIC SURGERY

## 2019-12-10 PROCEDURE — 2709999900 HC NON-CHARGEABLE SUPPLY: Performed by: ORTHOPAEDIC SURGERY

## 2019-12-10 PROCEDURE — 6360000002 HC RX W HCPCS: Performed by: NURSE ANESTHETIST, CERTIFIED REGISTERED

## 2019-12-10 PROCEDURE — 7100000001 HC PACU RECOVERY - ADDTL 15 MIN: Performed by: ORTHOPAEDIC SURGERY

## 2019-12-10 PROCEDURE — 6360000002 HC RX W HCPCS: Performed by: ANESTHESIOLOGY

## 2019-12-10 PROCEDURE — 2500000003 HC RX 250 WO HCPCS: Performed by: ORTHOPAEDIC SURGERY

## 2019-12-10 RX ORDER — KETOROLAC TROMETHAMINE 30 MG/ML
INJECTION, SOLUTION INTRAMUSCULAR; INTRAVENOUS PRN
Status: DISCONTINUED | OUTPATIENT
Start: 2019-12-10 | End: 2019-12-10 | Stop reason: SDUPTHER

## 2019-12-10 RX ORDER — FENTANYL CITRATE 50 UG/ML
25 INJECTION, SOLUTION INTRAMUSCULAR; INTRAVENOUS EVERY 5 MIN PRN
Status: DISCONTINUED | OUTPATIENT
Start: 2019-12-10 | End: 2019-12-10 | Stop reason: HOSPADM

## 2019-12-10 RX ORDER — FENTANYL CITRATE 50 UG/ML
INJECTION, SOLUTION INTRAMUSCULAR; INTRAVENOUS PRN
Status: DISCONTINUED | OUTPATIENT
Start: 2019-12-10 | End: 2019-12-10 | Stop reason: SDUPTHER

## 2019-12-10 RX ORDER — HYDROMORPHONE HCL 110MG/55ML
0.5 PATIENT CONTROLLED ANALGESIA SYRINGE INTRAVENOUS EVERY 5 MIN PRN
Status: DISCONTINUED | OUTPATIENT
Start: 2019-12-10 | End: 2019-12-10 | Stop reason: HOSPADM

## 2019-12-10 RX ORDER — SODIUM CHLORIDE, SODIUM LACTATE, POTASSIUM CHLORIDE, CALCIUM CHLORIDE 600; 310; 30; 20 MG/100ML; MG/100ML; MG/100ML; MG/100ML
INJECTION, SOLUTION INTRAVENOUS CONTINUOUS
Status: DISCONTINUED | OUTPATIENT
Start: 2019-12-10 | End: 2019-12-10 | Stop reason: HOSPADM

## 2019-12-10 RX ORDER — ONDANSETRON 2 MG/ML
4 INJECTION INTRAMUSCULAR; INTRAVENOUS
Status: DISCONTINUED | OUTPATIENT
Start: 2019-12-10 | End: 2019-12-10 | Stop reason: HOSPADM

## 2019-12-10 RX ORDER — CEFAZOLIN SODIUM 2 G/100ML
2 INJECTION, SOLUTION INTRAVENOUS
Status: COMPLETED | OUTPATIENT
Start: 2019-12-10 | End: 2019-12-10

## 2019-12-10 RX ORDER — OXYCODONE HYDROCHLORIDE 5 MG/1
5 TABLET ORAL EVERY 4 HOURS PRN
Status: DISCONTINUED | OUTPATIENT
Start: 2019-12-10 | End: 2019-12-10 | Stop reason: HOSPADM

## 2019-12-10 RX ORDER — SODIUM CHLORIDE, SODIUM LACTATE, POTASSIUM CHLORIDE, CALCIUM CHLORIDE 600; 310; 30; 20 MG/100ML; MG/100ML; MG/100ML; MG/100ML
INJECTION, SOLUTION INTRAVENOUS
Status: COMPLETED
Start: 2019-12-10 | End: 2019-12-10

## 2019-12-10 RX ORDER — HYDRALAZINE HYDROCHLORIDE 20 MG/ML
5 INJECTION INTRAMUSCULAR; INTRAVENOUS EVERY 10 MIN PRN
Status: DISCONTINUED | OUTPATIENT
Start: 2019-12-10 | End: 2019-12-10 | Stop reason: HOSPADM

## 2019-12-10 RX ORDER — SODIUM CHLORIDE 0.9 % (FLUSH) 0.9 %
10 SYRINGE (ML) INJECTION PRN
Status: DISCONTINUED | OUTPATIENT
Start: 2019-12-10 | End: 2019-12-10 | Stop reason: HOSPADM

## 2019-12-10 RX ORDER — LIDOCAINE HYDROCHLORIDE 20 MG/ML
INJECTION, SOLUTION EPIDURAL; INFILTRATION; INTRACAUDAL; PERINEURAL PRN
Status: DISCONTINUED | OUTPATIENT
Start: 2019-12-10 | End: 2019-12-10 | Stop reason: SDUPTHER

## 2019-12-10 RX ORDER — MIDAZOLAM HYDROCHLORIDE 1 MG/ML
INJECTION INTRAMUSCULAR; INTRAVENOUS PRN
Status: DISCONTINUED | OUTPATIENT
Start: 2019-12-10 | End: 2019-12-10 | Stop reason: SDUPTHER

## 2019-12-10 RX ORDER — ACETAMINOPHEN 10 MG/ML
INJECTION, SOLUTION INTRAVENOUS PRN
Status: DISCONTINUED | OUTPATIENT
Start: 2019-12-10 | End: 2019-12-10 | Stop reason: SDUPTHER

## 2019-12-10 RX ORDER — BUPIVACAINE HYDROCHLORIDE 5 MG/ML
INJECTION, SOLUTION EPIDURAL; INTRACAUDAL
Status: COMPLETED | OUTPATIENT
Start: 2019-12-10 | End: 2019-12-10

## 2019-12-10 RX ORDER — EPHEDRINE SULFATE 50 MG/ML
INJECTION, SOLUTION INTRAVENOUS PRN
Status: DISCONTINUED | OUTPATIENT
Start: 2019-12-10 | End: 2019-12-10 | Stop reason: SDUPTHER

## 2019-12-10 RX ORDER — OXYCODONE HYDROCHLORIDE AND ACETAMINOPHEN 5; 325 MG/1; MG/1
TABLET ORAL
Status: COMPLETED
Start: 2019-12-10 | End: 2019-12-10

## 2019-12-10 RX ORDER — OXYCODONE HYDROCHLORIDE 5 MG/1
10 TABLET ORAL EVERY 4 HOURS PRN
Status: DISCONTINUED | OUTPATIENT
Start: 2019-12-10 | End: 2019-12-10 | Stop reason: HOSPADM

## 2019-12-10 RX ORDER — HYDROMORPHONE HCL 110MG/55ML
0.25 PATIENT CONTROLLED ANALGESIA SYRINGE INTRAVENOUS EVERY 5 MIN PRN
Status: DISCONTINUED | OUTPATIENT
Start: 2019-12-10 | End: 2019-12-10 | Stop reason: HOSPADM

## 2019-12-10 RX ORDER — SODIUM CHLORIDE, SODIUM LACTATE, POTASSIUM CHLORIDE, CALCIUM CHLORIDE 600; 310; 30; 20 MG/100ML; MG/100ML; MG/100ML; MG/100ML
INJECTION, SOLUTION INTRAVENOUS ONCE
Status: COMPLETED | OUTPATIENT
Start: 2019-12-10 | End: 2019-12-10

## 2019-12-10 RX ORDER — ONDANSETRON 2 MG/ML
INJECTION INTRAMUSCULAR; INTRAVENOUS PRN
Status: DISCONTINUED | OUTPATIENT
Start: 2019-12-10 | End: 2019-12-10 | Stop reason: SDUPTHER

## 2019-12-10 RX ORDER — DOCUSATE SODIUM 100 MG/1
100 CAPSULE, LIQUID FILLED ORAL 2 TIMES DAILY
Status: DISCONTINUED | OUTPATIENT
Start: 2019-12-10 | End: 2019-12-10 | Stop reason: HOSPADM

## 2019-12-10 RX ORDER — LABETALOL 20 MG/4 ML (5 MG/ML) INTRAVENOUS SYRINGE
5 EVERY 10 MIN PRN
Status: DISCONTINUED | OUTPATIENT
Start: 2019-12-10 | End: 2019-12-10 | Stop reason: HOSPADM

## 2019-12-10 RX ORDER — PROPOFOL 10 MG/ML
INJECTION, EMULSION INTRAVENOUS PRN
Status: DISCONTINUED | OUTPATIENT
Start: 2019-12-10 | End: 2019-12-10 | Stop reason: SDUPTHER

## 2019-12-10 RX ORDER — SODIUM CHLORIDE 0.9 % (FLUSH) 0.9 %
10 SYRINGE (ML) INJECTION EVERY 12 HOURS SCHEDULED
Status: DISCONTINUED | OUTPATIENT
Start: 2019-12-10 | End: 2019-12-10 | Stop reason: HOSPADM

## 2019-12-10 RX ORDER — FENTANYL CITRATE 50 UG/ML
50 INJECTION, SOLUTION INTRAMUSCULAR; INTRAVENOUS EVERY 5 MIN PRN
Status: DISCONTINUED | OUTPATIENT
Start: 2019-12-10 | End: 2019-12-10 | Stop reason: HOSPADM

## 2019-12-10 RX ORDER — GLYCOPYRROLATE 1 MG/5 ML
SYRINGE (ML) INTRAVENOUS PRN
Status: DISCONTINUED | OUTPATIENT
Start: 2019-12-10 | End: 2019-12-10 | Stop reason: SDUPTHER

## 2019-12-10 RX ORDER — DEXAMETHASONE SODIUM PHOSPHATE 4 MG/ML
INJECTION, SOLUTION INTRA-ARTICULAR; INTRALESIONAL; INTRAMUSCULAR; INTRAVENOUS; SOFT TISSUE PRN
Status: DISCONTINUED | OUTPATIENT
Start: 2019-12-10 | End: 2019-12-10 | Stop reason: SDUPTHER

## 2019-12-10 RX ORDER — PROMETHAZINE HYDROCHLORIDE 25 MG/ML
6.25 INJECTION, SOLUTION INTRAMUSCULAR; INTRAVENOUS
Status: DISCONTINUED | OUTPATIENT
Start: 2019-12-10 | End: 2019-12-10 | Stop reason: HOSPADM

## 2019-12-10 RX ADMIN — Medication 0.2 MG: at 08:39

## 2019-12-10 RX ADMIN — EPHEDRINE SULFATE 10 MG: 50 INJECTION, SOLUTION INTRAMUSCULAR; INTRAVENOUS; SUBCUTANEOUS at 08:49

## 2019-12-10 RX ADMIN — DEXAMETHASONE SODIUM PHOSPHATE 8 MG: 4 INJECTION, SOLUTION INTRAMUSCULAR; INTRAVENOUS at 08:23

## 2019-12-10 RX ADMIN — CEFAZOLIN SODIUM 2 G: 2 INJECTION, SOLUTION INTRAVENOUS at 08:16

## 2019-12-10 RX ADMIN — KETOROLAC TROMETHAMINE 30 MG: 30 INJECTION, SOLUTION INTRAMUSCULAR; INTRAVENOUS at 08:55

## 2019-12-10 RX ADMIN — ACETAMINOPHEN 1000 MG: 10 INJECTION, SOLUTION INTRAVENOUS at 08:31

## 2019-12-10 RX ADMIN — SODIUM CHLORIDE, POTASSIUM CHLORIDE, SODIUM LACTATE AND CALCIUM CHLORIDE 1000 ML: 600; 310; 30; 20 INJECTION, SOLUTION INTRAVENOUS at 07:30

## 2019-12-10 RX ADMIN — OXYCODONE HYDROCHLORIDE AND ACETAMINOPHEN 1 TABLET: 5; 325 TABLET ORAL at 10:53

## 2019-12-10 RX ADMIN — LIDOCAINE HYDROCHLORIDE 2 ML: 20 INJECTION, SOLUTION EPIDURAL; INFILTRATION; INTRACAUDAL; PERINEURAL at 08:21

## 2019-12-10 RX ADMIN — SODIUM CHLORIDE, POTASSIUM CHLORIDE, SODIUM LACTATE AND CALCIUM CHLORIDE: 600; 310; 30; 20 INJECTION, SOLUTION INTRAVENOUS at 08:16

## 2019-12-10 RX ADMIN — MIDAZOLAM 2 MG: 1 INJECTION INTRAMUSCULAR; INTRAVENOUS at 08:21

## 2019-12-10 RX ADMIN — ONDANSETRON 4 MG: 2 INJECTION INTRAMUSCULAR; INTRAVENOUS at 08:20

## 2019-12-10 RX ADMIN — FENTANYL CITRATE 50 MCG: 50 INJECTION INTRAMUSCULAR; INTRAVENOUS at 09:23

## 2019-12-10 RX ADMIN — FENTANYL CITRATE 100 MCG: 50 INJECTION INTRAMUSCULAR; INTRAVENOUS at 08:21

## 2019-12-10 RX ADMIN — SODIUM CHLORIDE, SODIUM LACTATE, POTASSIUM CHLORIDE, CALCIUM CHLORIDE 1000 ML: 600; 310; 30; 20 INJECTION, SOLUTION INTRAVENOUS at 07:30

## 2019-12-10 RX ADMIN — PROPOFOL 150 MG: 10 INJECTION, EMULSION INTRAVENOUS at 08:21

## 2019-12-10 ASSESSMENT — PULMONARY FUNCTION TESTS
PIF_VALUE: 5
PIF_VALUE: 5
PIF_VALUE: 15
PIF_VALUE: 18
PIF_VALUE: 1
PIF_VALUE: 1
PIF_VALUE: 15
PIF_VALUE: 15
PIF_VALUE: 21
PIF_VALUE: 16
PIF_VALUE: 23
PIF_VALUE: 14
PIF_VALUE: 13
PIF_VALUE: 1
PIF_VALUE: 23
PIF_VALUE: 15
PIF_VALUE: 1
PIF_VALUE: 19
PIF_VALUE: 14
PIF_VALUE: 4
PIF_VALUE: 6
PIF_VALUE: 23
PIF_VALUE: 14
PIF_VALUE: 14
PIF_VALUE: 2
PIF_VALUE: 5
PIF_VALUE: 1
PIF_VALUE: 15
PIF_VALUE: 24
PIF_VALUE: 14
PIF_VALUE: 1
PIF_VALUE: 5
PIF_VALUE: 15
PIF_VALUE: 22
PIF_VALUE: 6
PIF_VALUE: 5
PIF_VALUE: 13
PIF_VALUE: 23
PIF_VALUE: 19
PIF_VALUE: 23
PIF_VALUE: 14
PIF_VALUE: 14
PIF_VALUE: 24
PIF_VALUE: 2
PIF_VALUE: 39
PIF_VALUE: 18
PIF_VALUE: 1

## 2019-12-10 ASSESSMENT — PAIN DESCRIPTION - DESCRIPTORS
DESCRIPTORS: THROBBING
DESCRIPTORS: THROBBING

## 2019-12-10 ASSESSMENT — PAIN DESCRIPTION - PAIN TYPE
TYPE: SURGICAL PAIN
TYPE: SURGICAL PAIN

## 2019-12-10 ASSESSMENT — PAIN DESCRIPTION - ONSET: ONSET: AWAKENED FROM SLEEP

## 2019-12-10 ASSESSMENT — PAIN DESCRIPTION - ORIENTATION
ORIENTATION: RIGHT
ORIENTATION: RIGHT

## 2019-12-10 ASSESSMENT — PAIN SCALES - GENERAL
PAINLEVEL_OUTOF10: 8
PAINLEVEL_OUTOF10: 8
PAINLEVEL_OUTOF10: 6
PAINLEVEL_OUTOF10: 4

## 2019-12-10 ASSESSMENT — PAIN DESCRIPTION - LOCATION
LOCATION: ELBOW
LOCATION: ELBOW

## 2019-12-10 ASSESSMENT — PAIN - FUNCTIONAL ASSESSMENT: PAIN_FUNCTIONAL_ASSESSMENT: 0-10

## 2019-12-10 ASSESSMENT — PAIN DESCRIPTION - FREQUENCY: FREQUENCY: CONTINUOUS

## 2019-12-31 ENCOUNTER — OFFICE VISIT (OUTPATIENT)
Dept: ORTHOPEDIC SURGERY | Age: 49
End: 2019-12-31

## 2019-12-31 VITALS
RESPIRATION RATE: 16 BRPM | HEIGHT: 68 IN | WEIGHT: 255.95 LBS | BODY MASS INDEX: 38.79 KG/M2 | OXYGEN SATURATION: 98 % | HEART RATE: 64 BPM | TEMPERATURE: 98.1 F

## 2019-12-31 DIAGNOSIS — Z98.890 S/P TENDON REPAIR: Primary | ICD-10-CM

## 2019-12-31 PROCEDURE — 99024 POSTOP FOLLOW-UP VISIT: CPT | Performed by: ORTHOPAEDIC SURGERY

## 2020-01-08 ENCOUNTER — OFFICE VISIT (OUTPATIENT)
Dept: ORTHOPEDIC SURGERY | Age: 50
End: 2020-01-08

## 2020-01-08 VITALS — BODY MASS INDEX: 38.95 KG/M2 | RESPIRATION RATE: 16 BRPM | HEIGHT: 68 IN | WEIGHT: 257 LBS

## 2020-01-08 PROCEDURE — 99024 POSTOP FOLLOW-UP VISIT: CPT | Performed by: PHYSICIAN ASSISTANT

## 2020-01-08 NOTE — LETTER
[facility]  [address]  Phone: [phone]  Fax: [phone]    Sharif Javier        January 8, 2020     Patient: Carlos Shea   YOB: 1970   Date of Visit: 1/8/2020       To Whom It May Concern: It is my medical opinion that Preeti Rao may return to work on 1/13/2019 with the following restrictions: lifting/carrying not to exceed 2 lbs. , pushing/pulling not to exceed 2 lbs. In addition when she returns she is to return on a limited basis of no more than 4 hours/day until follow-up visit with Dr. Jaime Fernandez in 4 weeks. If you have any questions or concerns, please don't hesitate to call.     Sincerely,        Bridget Opitz, PA-C

## 2020-01-08 NOTE — PROGRESS NOTES
I reviewed and agree with the portions of the HPI, review of systems, vital documentation and plan performed by my staff and have added/addended where appropriate. Date of surgery:   December 10th 2019  Per Dr Manuela Sutton   History:  Ms. Coleen Meckel is here in follow up regarding her right elbow:  1. Right lateral epicondylitis tendon debridement, Bosworth procedure   She is having 0-1/10 pain, is mainly just having some stiffness in the elbow. She is not in OT. Her sutures were removed last week on 12/31/19 at a NV in office. She denies chest pain, SOB, calf pain,fever,wound drainage. No other issues. Physical: She demonstrates appropriate mood and affect.    Right Elbow exam:  Incision is clean dry and intact  Range of motion of the right elbow:  5-130 degrees  Supination 0-70°    Impression: Status post right lateral elbow tendon debridement-Bosworth procedure     Plan:   Patient Instructions   -Occupational therapy ordered   -work on ROM of the elbow while at home as well   -Return to work on Monday 13th 2019 on a limited basis, 4 hours/day  -No lifting, pushing, pulling over 1-2 pounds with right upper extremity   -follow-up with Dr. Hyacinth Vides in 4 weeks

## 2020-01-08 NOTE — LETTER
[facility]  [address]  Phone: [phone]  Fax: [phone]    Nora Palomares        January 8, 2020     Patient: Coleen Meckel   YOB: 1970   Date of Visit: 1/8/2020       To Whom It May Concern:    Geronimo Brandon had surgery on her right elbow on 12/10/2019 and has been out of work since due to the surgery. Please see other letter with new work restrictions and RTW date of 1/13/2020. If you have any questions or concerns, please don't hesitate to call.     Sincerely,        Amy Ng PA-C

## 2020-01-15 ENCOUNTER — HOSPITAL ENCOUNTER (OUTPATIENT)
Dept: OCCUPATIONAL THERAPY | Age: 50
Setting detail: THERAPIES SERIES
Discharge: HOME | End: 2020-01-15
Payer: COMMERCIAL

## 2020-01-15 PROCEDURE — 97166 OT EVAL MOD COMPLEX 45 MIN: CPT

## 2020-01-15 PROCEDURE — 97110 THERAPEUTIC EXERCISES: CPT

## 2020-01-15 PROCEDURE — 97035 APP MDLTY 1+ULTRASOUND EA 15: CPT

## 2020-01-15 NOTE — FLOWSHEET NOTE
Occupational Therapy Out Patient Daily Treatment Note     [x][place] [address]      [facility]     [address] [facility] [address]       [facility] [address]       [address]     [phone]  [phone] [phone]  ______________________________________________________________________  Date:  1/15/2020  Patient Name:  Pastor Cespedes    :  1970  Restrictions/Precautions:  General, surgical protocols 2# wt limit  Diagnosis:   S/P lateral epi  Treatment Diagnosis:  elbow stiffness  Insurance/Certification information:  [employer]  Referring Physician:   Caridad Garcia [facility]  Plan of care signed (Y/N):    Visit# / total visits:   Pain level: 0/10 Sometimes gets sharp pains posterior elbow and radiating pain down EDC    Subjective: States felt better after US and massage.  [place] more flexible  Prior Level of Function: Progressing lat epi from work    Patient Goals: increase use of RUE pain free    Treatment Flowsheet   Right Left     US lat elbow x    Massage scar x    stretches x    Wrist flex and ext 1# x    Elbow flex 1# x      AROM/gentle PROM x    Issued and instructed in HEP                                                                                           Interventions/Modalities used:  [x] Therapeutic Exercise   [x] Modalities:  [x] Therapeutic Activity    [x] Ultrasound [] Elec Stimulation   [] Total Motion Release    [] Fluido [] Kinesiotaping  [] Neuromuscular Re-education   [] Ionto [] Coldpack/hotpack   [x] Instruction in HEP    Other: scar management    Objective Findings: See eval    Communication with other providers: POC to physician    Education provided to patient: Issued and instructed in HEP    Adverse Reactions to treatment: none    Time in:  1530  Time out:  1615  Timed treatment minutes:  20  Total treatment time:  45    Treatment/Activity Tolerance:     [x]  Patient tolerated treatment well []  Patient limited by fatique    []  Patient limited by pain []  Patient limited by other medical complications   []  Other:     Goals   Pt will increase AROM elbow to WNL to increase functional mobility.   Pt will increase  R 10-15# to increase functional grasp   Pt will follow thru and be independent with HEP   Scar management  LTG:  Pt will decrease Quick Dash below 30 for significant performance improvement.     Patient Requires Follow-up:  [x]  Yes  []  No    Plan: []  Continue per plan of care []  Alter current plan (see comments)   [x]  Plan of care initiated []  Hold pending MD visit []  Discharge    Plan for Next Session:      Electronically signed by:  ANTONIO King/L, 1/15/2020, 6:06 PM

## 2020-02-03 ENCOUNTER — OFFICE VISIT (OUTPATIENT)
Dept: ORTHOPEDIC SURGERY | Age: 50
End: 2020-02-03

## 2020-02-03 VITALS — BODY MASS INDEX: 38.96 KG/M2 | OXYGEN SATURATION: 99 % | WEIGHT: 257.06 LBS | HEART RATE: 70 BPM | HEIGHT: 68 IN

## 2020-02-03 PROCEDURE — 99024 POSTOP FOLLOW-UP VISIT: CPT | Performed by: ORTHOPAEDIC SURGERY

## 2020-02-03 NOTE — LETTER
[address] [facility]  [address] [facility]  Phone: [phone]  Fax: [phone]    Juju Sanford, DO        February 3, 2020     Patient: Davina Malin   YOB: 1970   Date of Visit: 2/3/2020       To Whom It May Concern: It is my medical opinion that Kenna Garcia was seen today in my office for a 11:00 appointment. He may return to work with no restrictions effective 2/17/20. If you have any questions or concerns, please don't hesitate to call.     Sincerely,        Juju Sanford, DO

## 2020-02-03 NOTE — LETTER
[address] [facility]  [address]  Phone: [phone]  Fax: [phone]    Shantel Almonte DO        February 3, 2020     Patient: Govind Suarez   YOB: 1970   Date of Visit: 2/3/2020       To Whom It May Concern: It is my medical opinion that Mohit Porras was seen today in my office for a 11:00 appointment. She may return to work with no restrictions effective 2/17/20. If you have any questions or concerns, please don't hesitate to call.     Sincerely,        Shantel Almonte DO

## 2020-02-04 ASSESSMENT — ENCOUNTER SYMPTOMS
COLOR CHANGE: 0
SHORTNESS OF BREATH: 0

## 2020-02-04 NOTE — PROGRESS NOTES
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 Patient present to office for a follow up Post Op right elbow Bosworth procedure DOS 12/10/19. No complaints. Reports no pain. She comes in today for her 7-week postop recheck after right elbow lateral epicondylitis tendon debridement. She states that overall the pain she was having before surgery is already gone. She states that her motion has returned to normal and she has resumed her normal activities without any difficulty and with no pain. She is very happy with her result at this point. Patient denies any new injury to the involved extremity/ joint, denies numbness or tingling in the involved extremity and denies fever or chills. Review of Systems   Constitutional: Negative for activity change, chills and fever. Respiratory: Negative for shortness of breath. Cardiovascular: Negative for chest pain. Musculoskeletal: Negative for arthralgias, gait problem, joint swelling and myalgias. Skin: Negative for color change, pallor, rash and wound. Neurological: Negative for weakness and numbness. 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 HENT:      Head: Normocephalic and atraumatic. Eyes:      Pupils: Pupils are equal, round, and reactive to light. Neck:      Musculoskeletal: Normal range of motion.    Pulmonary:      Effort: Pulmonary effort is normal.   Musculoskeletal: Normal range of motion. General: No tenderness or deformity. Right elbow: She exhibits normal range of motion, no swelling, no effusion, no deformity and no laceration. No tenderness found. No radial head, no medial epicondyle, no lateral epicondyle and no olecranon process tenderness noted. Left elbow: She exhibits normal range of motion, no swelling, no effusion, no deformity and no laceration. No tenderness found. No radial head, no medial epicondyle, no lateral epicondyle and no olecranon process tenderness noted. Right wrist: Normal.      Left wrist: Normal.   Skin:     General: Skin is warm and dry. Capillary Refill: Capillary refill takes less than 2 seconds. Coloration: Skin is not pale. Findings: No erythema or rash. Neurological:      Mental Status: She is alert and oriented to person, place, and time. Right elbow-Incision clean, dry, intact, with no erythema, no drainage, and no signs of infection. full range of motion  Strength 5/5. Assessment:      Right lateral epicondylitis tendon debridement, 7 weeks      Plan:       I discussed with her today that she is progressing extremely well. I discussed with the patient today that their are symptoms are normal and should improve with time. Continue weight-bearing as tolerated. Continue range of motion exercises as instructed. Ice and elevate as needed. Tylenol or Motrin for pain. Follow up as needed.           Juju Sanford, DO

## 2020-03-10 ENCOUNTER — OFFICE VISIT (OUTPATIENT)
Dept: FAMILY MEDICINE CLINIC | Age: 50
End: 2020-03-10
Payer: COMMERCIAL

## 2020-03-10 VITALS
BODY MASS INDEX: 38.71 KG/M2 | HEIGHT: 68 IN | HEART RATE: 76 BPM | WEIGHT: 255.4 LBS | DIASTOLIC BLOOD PRESSURE: 70 MMHG | SYSTOLIC BLOOD PRESSURE: 120 MMHG

## 2020-03-10 LAB
CHOLESTEROL, TOTAL: 247 MG/DL (ref 0–199)
HDLC SERPL-MCNC: 52 MG/DL (ref 40–60)
LDL CHOLESTEROL CALCULATED: 173 MG/DL
TRIGL SERPL-MCNC: 112 MG/DL (ref 0–150)
VLDLC SERPL CALC-MCNC: 22 MG/DL

## 2020-03-10 PROCEDURE — 90715 TDAP VACCINE 7 YRS/> IM: CPT | Performed by: FAMILY MEDICINE

## 2020-03-10 PROCEDURE — 36415 COLL VENOUS BLD VENIPUNCTURE: CPT | Performed by: FAMILY MEDICINE

## 2020-03-10 PROCEDURE — 90471 IMMUNIZATION ADMIN: CPT | Performed by: FAMILY MEDICINE

## 2020-03-10 PROCEDURE — 99214 OFFICE O/P EST MOD 30 MIN: CPT | Performed by: FAMILY MEDICINE

## 2020-03-10 RX ORDER — ALBUTEROL SULFATE 90 UG/1
2 POWDER, METERED RESPIRATORY (INHALATION) EVERY 6 HOURS PRN
Qty: 1 INHALER | Refills: 2 | Status: SHIPPED | OUTPATIENT
Start: 2020-03-10 | End: 2021-05-10 | Stop reason: SDUPTHER

## 2020-03-10 RX ORDER — BECLOMETHASONE DIPROPIONATE HFA 80 UG/1
2 AEROSOL, METERED RESPIRATORY (INHALATION) 2 TIMES DAILY
Qty: 1 INHALER | Refills: 5 | Status: SHIPPED | OUTPATIENT
Start: 2020-03-10 | End: 2020-09-28 | Stop reason: SDUPTHER

## 2020-03-10 RX ORDER — TRIAMTERENE AND HYDROCHLOROTHIAZIDE 37.5; 25 MG/1; MG/1
1 CAPSULE ORAL DAILY
Qty: 30 CAPSULE | Refills: 5 | Status: SHIPPED | OUTPATIENT
Start: 2020-03-10 | End: 2020-09-28 | Stop reason: ALTCHOICE

## 2020-03-10 RX ORDER — SERTRALINE HYDROCHLORIDE 100 MG/1
100 TABLET, FILM COATED ORAL DAILY
Qty: 30 TABLET | Refills: 11 | Status: SHIPPED | OUTPATIENT
Start: 2020-03-10 | End: 2021-01-05 | Stop reason: SDUPTHER

## 2020-03-10 RX ORDER — METOPROLOL SUCCINATE 100 MG/1
100 TABLET, EXTENDED RELEASE ORAL DAILY
Qty: 30 TABLET | Refills: 5 | Status: SHIPPED | OUTPATIENT
Start: 2020-03-10 | End: 2020-09-28 | Stop reason: SDUPTHER

## 2020-03-10 RX ORDER — SUMATRIPTAN 100 MG/1
100 TABLET, FILM COATED ORAL
Qty: 9 TABLET | Refills: 11 | Status: SHIPPED | OUTPATIENT
Start: 2020-03-10 | End: 2021-01-05 | Stop reason: SDUPTHER

## 2020-03-10 RX ORDER — ATORVASTATIN CALCIUM 80 MG/1
80 TABLET, FILM COATED ORAL DAILY
Qty: 30 TABLET | Refills: 5 | Status: SHIPPED | OUTPATIENT
Start: 2020-03-10 | End: 2020-09-28 | Stop reason: SDUPTHER

## 2020-03-10 ASSESSMENT — PATIENT HEALTH QUESTIONNAIRE - PHQ9
SUM OF ALL RESPONSES TO PHQ QUESTIONS 1-9: 1
1. LITTLE INTEREST OR PLEASURE IN DOING THINGS: 0
SUM OF ALL RESPONSES TO PHQ9 QUESTIONS 1 & 2: 1
2. FEELING DOWN, DEPRESSED OR HOPELESS: 1
SUM OF ALL RESPONSES TO PHQ QUESTIONS 1-9: 1

## 2020-03-10 ASSESSMENT — ENCOUNTER SYMPTOMS
CHEST TIGHTNESS: 0
SHORTNESS OF BREATH: 0

## 2020-03-10 NOTE — PATIENT INSTRUCTIONS
VOTE ON MARCH SE[place]H    PLEASE BRING YOUR MEDICATIONS TO ALL APPOINTMENTS    The diagnoses and medications listed in this after visit summary may not be accurate at the time of check out. Please check MY CHART in 28-48 hours for possible corrections. Late cancellation policy: So that we can better accommodate people who are sick, please give our office 24 hour notice for an appointment cancellation. Thank you. Missed appointments: Your care is very important to us. It is important that you keep your scheduled appointments. Multiple missed appointments may lead to a dismissal from the office. Later arrival policy: If you are more than 10 minutes late for your appointment, you will be asked to reschedule. Please allow 5-7 business days for paperwork to be processed. It is important that you check your MY Chart messages, as they include appointment reminders, test results, and other important information. If you have forgotten your password, please call [phone]. Walking for Exercise: Care Instructions  Your Care Instructions    Walking is one of the easiest ways to get the exercise you need for good health. A brisk, 30-minute walk each day can help you feel better and have more energy. It can help you lower your risk of disease. Walking can help you keep your bones strong and your heart healthy. Check with your doctor before you start a walking plan if you have heart problems, other health issues, or you have not been active in a long time. Follow your doctor's instructions for safe levels of exercise. Follow-up care is a key part of your treatment and safety. Be sure to make and go to all appointments, and call your doctor if you are having problems. It's also a good idea to know your test results and keep a list of the medicines you take. How can you care for yourself at home?   Getting started  · Start slowly and set a short-term goal. For example, walk for 5 when you walk. This is very important if it is hot out. · Be careful not to slip on wet or icy ground. You can buy \"grippers\" for your shoes to help keep you from slipping. · Pay attention to your walking surface. Use sidewalks and paths. · If you have breathing problems like asthma or COPD, ask your doctor when it is safe for you to walk outdoors. Cold, dry air, smog, pollen, or other things in the air could cause breathing problems. Where can you learn more? Go to https://Winking Entertainmentpepiceweb.Treatsie. org and sign in to your mydeco account. Enter R159 in the Happyshop box to learn more about \"Walking for Exercise: Care Instructions. \"     If you do not have an account, please click on the \"Sign Up Now\" link. Current as of: August 19, 2018  Content Version: 12.0  © 7331-5370 [employer]. Care instructions adapted under license by [facility] ([facility]). If you have questions about a medical condition or this instruction, always ask your healthcare professional. [place] [address] any warranty or liability for your use of this information. Prediabetes: Care Instructions  Your Care Instructions    Prediabetes is a warning sign that you are at risk for getting type 2 diabetes. It means that your blood sugar is higher than it should be. The food you eat turns into sugar, which your body uses for energy. Normally, an organ called the pancreas makes insulin, which allows the sugar in your blood to get into your body's cells. But when your body can't use insulin the right way, the sugar doesn't move into cells. It stays in your blood instead. This is called insulin resistance. The buildup of sugar in the blood causes prediabetes. The good news is that lifestyle changes may help you get your blood sugar back to normal and help you avoid or delay diabetes. Follow-up care is a key part of your treatment and safety.  Be sure to make and go to all appointments, and call your doctor if you are having problems. It's also a good idea to know your test results and keep a list of the medicines you take. How can you care for yourself at home? · Watch your weight. A healthy weight helps your body use insulin properly. · Limit the amount of calories, sweets, and unhealthy fat you eat. Ask your doctor if you should see a dietitian. A registered dietitian can help you create meal plans that fit your lifestyle. · Get at least 30 minutes of exercise on most days of the week. Exercise helps control your blood sugar. It also helps you maintain a healthy weight. Walking is a good choice. You also may want to do other activities, such as running, swimming, cycling, or playing tennis or team sports. · Do not smoke. Smoking can make prediabetes worse. If you need help quitting, talk to your doctor about stop-smoking programs and medicines. These can increase your chances of quitting for good. · If your doctor prescribed medicines, take them exactly as prescribed. Call your doctor if you think you are having a problem with your medicine. You will get more details on the specific medicines your doctor prescribes. When should you call for help? Watch closely for changes in your health, and be sure to contact your doctor if:    · You have any symptoms of diabetes. These may include:  ? Being thirsty more often. ? Urinating more. ? Being hungrier. ? Losing weight. ? Being very tired. ? Having blurry vision. · You have a wound that will not heal.     · You have an infection that will not go away. · You have problems with your blood pressure. · You want more information about diabetes and how you can keep from getting it. Where can you learn more? Go to https://rigo.Gridium. org and sign in to your Evostor account. Enter I222 in the popchips box to learn more about \"Prediabetes: Care Instructions. \"     If you do not have an account, please click on the \"Sign

## 2020-03-10 NOTE — LETTER
[address] [facility]  [place] [address] [place] [address]  Phone: [phone]  Fax: [phone]    Jannie Flood MD        March 10, 2020     Patient: Kerry Conti   YOB: 1970   Date of Visit: 3/10/2020       To Whom it May Concern:    Carlotta Salcido was seen in my clinic on 3/10/2020. She can return to work on 03/10/2020, with no restrictions. If you have any questions or concerns, please don't hesitate to call.     Sincerely,         Jannie Flood MD

## 2020-03-10 NOTE — PROGRESS NOTES
Patient was seen today for IM Tdap left deltoid and tolerated procedure well. Administered by Romain Way Student, observed by Pedro STILES.
1541                   Objective:         Physical Exam  Vitals signs and nursing note reviewed. Constitutional:       General: She is not in acute distress. Appearance: She is well-developed. She is obese. HENT:      Head: Normocephalic and atraumatic. Neck:      Musculoskeletal: Neck supple. Cardiovascular:      Rate and Rhythm: Normal rate and regular rhythm. Heart sounds: Normal heart sounds, S1 normal and S2 normal.   Pulmonary:      Effort: Pulmonary effort is normal. No respiratory distress. Breath sounds: Normal breath sounds. No wheezing. Skin:     General: Skin is warm and dry. Neurological:      Mental Status: She is alert and oriented to person, place, and time. Comments: . Psychiatric:         Attention and Perception: She is attentive. Speech: Speech normal.         Behavior: Behavior normal.         Thought Content: Thought content normal.         Judgment: Judgment normal.       Vitals:    03/10/20 0827 03/10/20 0829   BP: (!) 130/97 120/70   Site: Left Upper Arm Right Upper Arm   Position: Sitting Sitting   Cuff Size: Medium Adult Medium Adult   Pulse: 76    Weight: 255 lb 6.4 oz (115.8 kg)    Height: 5' 7.99\" (1.727 m)      Body mass index is 38.84 kg/m². Wt Readings from Last 3 Encounters:   03/10/20 255 lb 6.4 oz (115.8 kg)   02/03/20 257 lb 0.9 oz (116.6 kg)   01/08/20 257 lb (116.6 kg)     BP Readings from Last 3 Encounters:   03/10/20 120/70   12/10/19 101/68   12/10/19 114/65          No results found for this visit on 03/10/20.   The 10-year ASCVD risk score (Darylene Kind., et al., 2013) is: 3%    Values used to calculate the score:      Age: 47 years      Sex: Female      Is Non- : Yes      Diabetic: No      Tobacco smoker: No      Systolic Blood Pressure: 983 mmHg      Is BP treated: Yes      HDL Cholesterol: 64 mg/dL      Total Cholesterol: 291 mg/dL  Lab Review   Hospital Outpatient Visit on 12/03/2019   Component Date

## 2020-03-11 LAB
ESTIMATED AVERAGE GLUCOSE: 114 MG/DL
HBA1C MFR BLD: 5.6 %

## 2020-08-17 ENCOUNTER — TELEPHONE (OUTPATIENT)
Dept: FAMILY MEDICINE CLINIC | Age: 50
End: 2020-08-17

## 2020-08-18 ENCOUNTER — VIRTUAL VISIT (OUTPATIENT)
Dept: FAMILY MEDICINE CLINIC | Age: 50
End: 2020-08-18
Payer: COMMERCIAL

## 2020-08-18 PROCEDURE — 99213 OFFICE O/P EST LOW 20 MIN: CPT | Performed by: FAMILY MEDICINE

## 2020-08-18 RX ORDER — METHOCARBAMOL 500 MG/1
500 TABLET, FILM COATED ORAL 4 TIMES DAILY
Qty: 40 TABLET | Refills: 1 | Status: SHIPPED | OUTPATIENT
Start: 2020-08-18 | End: 2020-09-28

## 2020-08-18 RX ORDER — IBUPROFEN 800 MG/1
800 TABLET ORAL 2 TIMES DAILY PRN
Qty: 40 TABLET | Refills: 0 | Status: SHIPPED | OUTPATIENT
Start: 2020-08-18 | End: 2020-11-05

## 2020-08-18 ASSESSMENT — ENCOUNTER SYMPTOMS: BACK PAIN: 1

## 2020-08-18 NOTE — PROGRESS NOTES
2020    TELEHEALTH EVALUATION -- Audio/Visual (During FQCTT-62 public health emergency)    HPI:    Kenney Boswell (:  1970) has requested an audio/video evaluation for the following concern(s):    Back: 2 weeks. Started suddently when woke up. tried ibuprofen, ice, heat for 2-3 days. ER gave her shot of Toradol. Given script for Ibuprofen and robaxin. No radiation to hips or leg. Entire lower back. ER doctor told her it was muscular back pain and would not need xray. Across the entire lower back. No bony tenderness. No recent injury        Review of Systems   Musculoskeletal: Positive for back pain. No leg pain   Psychiatric/Behavioral: Positive for sleep disturbance. Prior to Visit Medications    Medication Sig Taking?  Authorizing Provider   triamterene-hydroCHLOROthiazide (DYAZIDE) 37.5-25 MG per capsule Take 1 capsule by mouth daily  Yajaira Jackson MD   metoprolol succinate (TOPROL XL) 100 MG extended release tablet Take 1 tablet by mouth daily  Yajaira Jackson MD   QVAR REDIHALER 80 MCG/ACT AERB inhaler Inhale 2 puffs into the lungs 2 times daily  Yajaira Jackson MD   atorvastatin (LIPITOR) 80 MG tablet Take 1 tablet by mouth daily  Yajaira Jackson MD   sertraline (ZOLOFT) 100 MG tablet Take 1 tablet by mouth daily  Yajaira Jackson MD   SUMAtriptan (IMITREX) 100 MG tablet Take 1 tablet by mouth once as needed for Migraine  Yajaira Jackson MD   albuterol sulfate (PROAIR RESPICLICK) 878 (90 Base) MCG/ACT aerosol powder inhalation Inhale 2 puffs into the lungs every 6 hours as needed for Shortness of Breath  Yajaira Jackson MD   ibuprofen (ADVIL;MOTRIN) 200 MG tablet Take 800 mg by mouth every 6 hours as needed for Pain Indications: taking PRN  Historical Provider, MD   Elastic Bandages & Supports ([address]) [address] Wear daily  Yajaira Jackson MD       Social History     Tobacco Use    Smoking status: Never Smoker    Smokeless tobacco: Never Used   Substance Use Topics  Alcohol use: Yes     Comment: occasionally    Drug use: No        Allergies   Allergen Reactions    Aloe Vera Rash   ,   Past Medical History:   Diagnosis Date    Arthritis     in right elbow    Asthma     last exacerbation 2014    Colon polyp 2010    Depression     Diverticulosis     colonoscopy 2016    Endometriosis     History of breast cancer 2009    lumpectomy r side chemo and radiation     HTN (hypertension) 9/18/2013    Hyperlipidemia 10/2012    Migraines 11/15/2019    LAST MIGRAINE 11/15/19    Ovarian cyst     Pre-diabetes     no meds    Seasonal allergies     Uterine fibroid        PHYSICAL EXAMINATION:  [ INSTRUCTIONS:  \"[x]\" Indicates a positive item  \"[]\" Indicates a negative item  -- DELETE ALL ITEMS NOT EXAMINED]  Vital Signs: (As obtained by patient/caregiver or practitioner observation)    Blood pressure-  Heart rate-    Respiratory rate-    Temperature-  Pulse oximetry-     Constitutional: [x] Appears well-developed and well-nourished [] No apparent distress      [] Abnormal-   Mental status  [x] Alert and awake  [x] Oriented to person/place/time []Able to follow commands      Eyes:  EOM    []  Normal  [] Abnormal-  Sclera  []  Normal  [] Abnormal -         Discharge []  None visible  [] Abnormal -    HENT:   [x] Normocephalic, atraumatic.   [] Abnormal   [] Mouth/Throat: Mucous membranes are moist.     External Ears [] Normal  [] Abnormal-     Neck: [x] No visualized mass     Pulmonary/Chest: [x] Respiratory effort normal.  [] No visualized signs of difficulty breathing or respiratory distress        [] Abnormal-      Musculoskeletal:   [] Normal gait with no signs of ataxia         [] Normal range of motion of neck        [] Abnormal-       Neurological:        [x] No Facial Asymmetry (Cranial nerve 7 motor function) (limited exam to video visit)          [] No gaze palsy        [] Abnormal-         Skin:        [x] No significant exanthematous lesions or discoloration noted on facial skin         [] Abnormal-            Psychiatric:       [x] Normal Affect [x] No Hallucinations        [] Abnormal-     Other pertinent observable physical exam findings-     ASSESSMENT/PLAN:  There are no diagnoses linked to this encounter. Diagnosis Orders   1. Acute bilateral low back pain without sciatica  methocarbamol (ROBAXIN) 500 MG tablet    ibuprofen (ADVIL;MOTRIN) 800 MG tablet     Alternate hot/cold pack    Back exercises sent via MY CHART    Re-assurance that back pain is very common. Since it's muscular, no need for xray    Re-check prn    No follow-ups on file. Noemi Wallace is a 45 y.o. female being evaluated by a Virtual Visit (video visit) encounter to address concerns as mentioned above. A caregiver was present when appropriate. Due to this being a TeleHealth encounter ([facility] public [facility] emergency), evaluation of the following organ systems was limited: Vitals/Constitutional/EENT/Resp/CV/GI//MS/Neuro/Skin/Heme-Lymph-Imm. Pursuant to the emergency declaration under the [address] [place] [address] authority and the [employer] Act, this Virtual Visit was conducted with patient's (and/or legal guardian's) consent, to reduce the patient's risk of exposure to COVID-19 and provide necessary medical care. The patient (and/or legal guardian) has also been advised to contact this office for worsening conditions or problems, and seek emergency medical treatment and/or call 911 if deemed necessary. Patient identification was verified at the start of the visit: Yes    Total time spent on this encounter: Not billed by time    Services were provided through a video synchronous discussion virtually to substitute for in-person clinic visit. Patient and provider were located at their individual homes.     --Lesa Wei MD on 8/18/2020 at 10:51 AM    An electronic signature was used to authenticate this note.

## 2020-08-18 NOTE — PATIENT INSTRUCTIONS
October Fifth is the DEADLINE for voter registration for the November Third GENERAL ELECTION. Don't forget to vote!!!        [address] TO ALL APPOINTMENTS    The diagnoses and medications listed in this after visit summary may not be accurate at the time of check out. Please check MY CHART in 28-48 hours for possible corrections. Late cancellation policy: So that we can better accommodate people who are sick, please give our office 24 hour notice for an appointment cancellation. Thank you. Missed appointments: Your care is very important to us. It is important that you keep your scheduled appointments. Multiple missed appointments will lead to a dismissal from the office. Later arrival policy: If you are more than 10 minutes late for your appointment, you will be asked to reschedule. Please allow 5-7 business days for paperwork to be processed. It is important that you check your MY Chart messages, as they include appointment reminders, test results, and other important information. If you have forgotten your password, please call [phone].

## 2020-09-14 ENCOUNTER — TELEPHONE (OUTPATIENT)
Dept: FAMILY MEDICINE CLINIC | Age: 50
End: 2020-09-14

## 2020-09-14 ENCOUNTER — OFFICE VISIT (OUTPATIENT)
Dept: FAMILY MEDICINE CLINIC | Age: 50
End: 2020-09-14
Payer: COMMERCIAL

## 2020-09-14 VITALS
DIASTOLIC BLOOD PRESSURE: 62 MMHG | WEIGHT: 259 LBS | TEMPERATURE: 97.7 F | BODY MASS INDEX: 39.39 KG/M2 | HEART RATE: 78 BPM | SYSTOLIC BLOOD PRESSURE: 96 MMHG

## 2020-09-14 PROCEDURE — 99213 OFFICE O/P EST LOW 20 MIN: CPT | Performed by: FAMILY MEDICINE

## 2020-09-14 PROCEDURE — 93000 ELECTROCARDIOGRAM COMPLETE: CPT | Performed by: FAMILY MEDICINE

## 2020-09-14 ASSESSMENT — ENCOUNTER SYMPTOMS
SHORTNESS OF BREATH: 0
NAUSEA: 0
DIARRHEA: 0
COUGH: 0
VOMITING: 0

## 2020-09-14 NOTE — PROGRESS NOTES
Patient ID: Fernando Palacios 1970    Chief Complaint   Patient presents with    Chest Pain     get orthostatic vitals         HPI  Chest pain: ongoing for a while and getting worse. Only occurs after bending over and when she is upright again. Accompanied by some dizziness and palpitations. In center of chest.  Denies any recent weight loss or weight gain. Review of Systems   Constitutional: Negative for chills, diaphoresis (no unusual) and fever. HENT:        No loss of taste or smell sensation   Respiratory: Negative for cough (no unusual) and shortness of breath (no unusual). Gastrointestinal: Negative for diarrhea, nausea and vomiting. Musculoskeletal: Negative for myalgias. Neurological: Negative for headaches. Patient Active Problem List   Diagnosis    History of breast cancer    Hyperlipidemia    Headache    Asthma    Malignant neoplasm of right female breast ([address]    Impaired glucose tolerance    Essential hypertension    Left foot pain    Obesity, Class III, BMI 40-49.9 (morbid obesity) ([address]    Chronic pain of left lower extremity    Right-sided chest wall pain    Right lateral epicondylitis       Past Surgical History:   Procedure Laterality Date    BREAST BIOPSY      COLONOSCOPY      polyp. Repeat in     COLONOSCOPY  2016    diverticulosis    ELBOW FRACTURE SURGERY Right 12/10/2019    RIGHT LATERAL EPICONDYLITIS TENDON DEBRIDEMENT, BOSWORTH PROCEDURE performed by Brooke Fuller DO at [facility] 73 HYSTERECTOMY      Cysts, Endometriosis.   1 ovary remains    TONSILLECTOMY AND ADENOIDECTOMY      TUBAL LIGATION         Family History   Problem Relation Age of Onset    Hypertension Father     Stroke Maternal Aunt     Diabetes Paternal Aunt     Diabetes Paternal Uncle     Stroke Maternal Grandmother     Depression Son     Asthma Son     Stroke Mother 79         in 2017   [place] Comment Sister                Current Outpatient Medications on File Prior to Visit   Medication Sig Dispense Refill    methocarbamol (ROBAXIN) 500 MG tablet Take 1 tablet by mouth 4 times daily 40 tablet 1    ibuprofen (ADVIL;MOTRIN) 800 MG tablet Take 1 tablet by mouth 2 times daily as needed for Pain 40 tablet 0    triamterene-hydroCHLOROthiazide (DYAZIDE) 37.5-25 MG per capsule Take 1 capsule by mouth daily 30 capsule 5    metoprolol succinate (TOPROL XL) 100 MG extended release tablet Take 1 tablet by mouth daily 30 tablet 5    QVAR REDIHALER 80 MCG/ACT AERB inhaler Inhale 2 puffs into the lungs 2 times daily 1 Inhaler 5    atorvastatin (LIPITOR) 80 MG tablet Take 1 tablet by mouth daily 30 tablet 5    sertraline (ZOLOFT) 100 MG tablet Take 1 tablet by mouth daily 30 tablet 11    SUMAtriptan (IMITREX) 100 MG tablet Take 1 tablet by mouth once as needed for Migraine 9 tablet 11    albuterol sulfate (PROAIR RESPICLICK) 635 (90 Base) MCG/ACT aerosol powder inhalation Inhale 2 puffs into the lungs every 6 hours as needed for Shortness of Breath 1 Inhaler 2    ibuprofen (ADVIL;MOTRIN) 200 MG tablet Take 800 mg by mouth every 6 hours as needed for Pain Indications: taking PRN      Elastic Bandages & Supports (TENNIS ELBOW NEOPRENE BRACE) MISC Wear daily 1 each 0     Current Facility-Administered Medications on File Prior to Visit   Medication Dose Route Frequency Provider Last Rate Last Dose    promethazine (PHENERGAN) injection 25 mg  25 mg Intramuscular Q6H PRN Claudette Vásquez MD   25 mg at 03/25/14 1541                   Objective:           Physical Exam  Vitals signs and nursing note reviewed. Constitutional:       Appearance: She is well-developed. She is obese. HENT:      Head: Normocephalic and atraumatic. Neck:      Musculoskeletal: Neck supple. Cardiovascular:      Rate and Rhythm: Normal rate and regular rhythm.       Heart sounds: Normal heart sounds, S1 normal and S2 normal.      Comments: No carotid bruits  Pulmonary:      Effort: Pulmonary effort is normal. No respiratory distress. Breath sounds: Normal breath sounds. No wheezing. Skin:     General: Skin is warm and dry. Neurological:      Mental Status: She is alert. Vitals:    09/14/20 1506 09/14/20 1509 09/14/20 1510   BP: 102/68 100/70 96/62   Site: Left Upper Arm Left Upper Arm Left Upper Arm   Position: Supine Sitting Standing   Cuff Size: Large Adult Large Adult Large Adult   Pulse: 59 67 78   Temp: 97.7 °F (36.5 °C)     TempSrc: Temporal     Weight: 259 lb (117.5 kg)       Body mass index is 39.39 kg/m². Wt Readings from Last 3 Encounters:   09/14/20 259 lb (117.5 kg)   03/10/20 255 lb 6.4 oz (115.8 kg)   02/03/20 257 lb 0.9 oz (116.6 kg)     BP Readings from Last 3 Encounters:   09/14/20 96/62   03/10/20 120/70   12/10/19 101/68          No results found for this visit on 09/14/20. Assessment:       Diagnosis Orders   1. Orthostatic hypotension     2. Chest pain, unspecified type  EKG 12 lead           Plan:      Cut the metoprolol in half (down to 50 mg per day)  and re-check in 2 weeks    Return in about 2 weeks (around 9/28/2020), or chest pain, check orthostatics.

## 2020-09-14 NOTE — PATIENT INSTRUCTIONS
October Fifth is the DEADLINE for voter registration for the November Third GENERAL ELECTION. Early voting starts October sixth. Don't forget to vote!!!        [address] TO ALL APPOINTMENTS    The diagnoses and medications listed in this after visit summary may not be accurate at the time of check out. Please check MY CHART in 28-48 hours for possible corrections. Late cancellation policy: So that we can better accommodate people who are sick, please give our office 24 hour notice for an appointment cancellation. Thank you. Missed appointments: Your care is very important to us. It is important that you keep your scheduled appointments. Multiple missed appointments will lead to a dismissal from the office. Later arrival policy: If you are more than 10 minutes late for your appointment, you will be asked to reschedule. Please allow 5-7 business days for paperwork to be processed. It is important that you check your MY Chart messages, as they include appointment reminders, test results, and other important information. If you have forgotten your password, please call [phone].

## 2020-09-14 NOTE — TELEPHONE ENCOUNTER
Patient called stating that when she bends over to do anything, when she comes back up it feels like something is sitting on her chest. Patient stated that she does feel light headed and does sweat at the time the feeling is terrible. She has to sit for a moment and then she is fine through out the day. This only happens when she bends over she does have a heart doctor but wanted to see what you would say about this.  Patient is doing fine at the moment,  Please advise

## 2020-09-28 ENCOUNTER — OFFICE VISIT (OUTPATIENT)
Dept: FAMILY MEDICINE CLINIC | Age: 50
End: 2020-09-28
Payer: COMMERCIAL

## 2020-09-28 VITALS
DIASTOLIC BLOOD PRESSURE: 60 MMHG | TEMPERATURE: 97.2 F | SYSTOLIC BLOOD PRESSURE: 104 MMHG | BODY MASS INDEX: 39.98 KG/M2 | HEIGHT: 68 IN | WEIGHT: 263.8 LBS | HEART RATE: 61 BPM | OXYGEN SATURATION: 82 %

## 2020-09-28 PROCEDURE — 90471 IMMUNIZATION ADMIN: CPT | Performed by: FAMILY MEDICINE

## 2020-09-28 PROCEDURE — 90686 IIV4 VACC NO PRSV 0.5 ML IM: CPT | Performed by: FAMILY MEDICINE

## 2020-09-28 PROCEDURE — 99214 OFFICE O/P EST MOD 30 MIN: CPT | Performed by: FAMILY MEDICINE

## 2020-09-28 RX ORDER — BECLOMETHASONE DIPROPIONATE HFA 80 UG/1
2 AEROSOL, METERED RESPIRATORY (INHALATION) 2 TIMES DAILY
Qty: 1 INHALER | Refills: 5 | Status: SHIPPED | OUTPATIENT
Start: 2020-09-28 | End: 2021-01-05 | Stop reason: SDUPTHER

## 2020-09-28 RX ORDER — ATORVASTATIN CALCIUM 80 MG/1
80 TABLET, FILM COATED ORAL DAILY
Qty: 30 TABLET | Refills: 5 | Status: SHIPPED | OUTPATIENT
Start: 2020-09-28 | End: 2021-01-05 | Stop reason: SDUPTHER

## 2020-09-28 RX ORDER — METOPROLOL SUCCINATE 100 MG/1
100 TABLET, EXTENDED RELEASE ORAL DAILY
Qty: 30 TABLET | Refills: 5 | Status: SHIPPED | OUTPATIENT
Start: 2020-09-28 | End: 2021-01-05 | Stop reason: SDUPTHER

## 2020-09-28 ASSESSMENT — ENCOUNTER SYMPTOMS
DIARRHEA: 0
SORE THROAT: 0
SHORTNESS OF BREATH: 0
VOMITING: 0
EYE REDNESS: 0
COUGH: 0
NAUSEA: 0

## 2020-09-28 NOTE — PROGRESS NOTES
Normocephalic and atraumatic. Neck:      Musculoskeletal: Neck supple. Cardiovascular:      Rate and Rhythm: Normal rate and regular rhythm. Heart sounds: Normal heart sounds, S1 normal and S2 normal.   Pulmonary:      Effort: Pulmonary effort is normal. No respiratory distress. Breath sounds: Normal breath sounds. No wheezing. Skin:     General: Skin is warm and dry. Neurological:      Mental Status: She is alert and oriented to person, place, and time. Comments: . Psychiatric:         Attention and Perception: She is attentive. Speech: Speech normal.         Behavior: Behavior normal.         Thought Content: Thought content normal.         Judgment: Judgment normal.       Vitals:    09/28/20 1601 09/28/20 1606 09/28/20 1609 09/28/20 1613   BP: 110/66 116/70 106/78 104/60   Position:  Supine Sitting Standing   Pulse: 66 61     Temp: 97.2 °F (36.2 °C)      TempSrc: Infrared      SpO2: 94% (!) 82%     Weight: 263 lb 12.8 oz (119.7 kg)      Height: 5' 8\" (1.727 m)        Body mass index is 40.11 kg/m². Wt Readings from Last 3 Encounters:   09/28/20 263 lb 12.8 oz (119.7 kg)   09/14/20 259 lb (117.5 kg)   03/10/20 255 lb 6.4 oz (115.8 kg)     BP Readings from Last 3 Encounters:   09/28/20 104/60   09/14/20 96/62   03/10/20 120/70          No results found for this visit on 09/28/20. The 10-year ASCVD risk score (Jose Sullivan et al., 2013) is: 1.3%    Values used to calculate the score:      Age: 45 years      Sex: Female      Is Non- : Yes      Diabetic: No      Tobacco smoker: No      Systolic Blood Pressure: 543 mmHg      Is BP treated: No      HDL Cholesterol: 52 mg/dL      Total Cholesterol: 247 mg/dL  Lab Review   No visits with results within 2 Month(s) from this visit.    Latest known visit with results is:   Office Visit on 03/10/2020   Component Date Value    Cholesterol, Total 03/10/2020 247*    Triglycerides 03/10/2020 112     HDL 03/10/2020 52     LDL Calculated 03/10/2020 173*    VLDL Cholesterol Calcula* 03/10/2020 22     Hemoglobin A1C 03/10/2020 5.6     eAG 03/10/2020 114.0            Assessment:       Diagnosis Orders   1. Essential hypertension  metoprolol succinate (TOPROL XL) 100 MG extended release tablet   2. Need for influenza vaccination  INFLUENZA, QUADV, 3 YRS AND OLDER, IM PF, PREFILL SYR OR SDV, 0.5ML (AFLURIA QUADV, PF)   3. Hyperlipidemia, unspecified hyperlipidemia type  atorvastatin (LIPITOR) 80 MG tablet   4. Mild intermittent asthma without complication  QVAR REDIHALER 80 MCG/ACT AERB inhaler   5. Encounter for screening mammogram for malignant neoplasm of breast  BECKY Screening Bilateral   6. Chest pain, unspecified type     7. Headache disorder             Plan:      See orders    Still not sure why having chest pain on sitting up, but no longer orthostatic. BP still borderline low. Re-check in January. Check bp at home  Will return to 100 mg Toprol and stop the Maxzide. The Toprol will help to reduce the HA. Needs low salt diet and weight loss to help reduce the leg swelling. Return in about 3 months (around 1/6/2021) for HTN, Edema, Headache.

## 2020-09-28 NOTE — PROGRESS NOTES
Patient was seen today for IM flu vaccine and tolerated procedure well. Vaccine Information Sheet, \"Influenza - Inactivated\"  given to Jessica Flavors, or parent/legal guardian of  Jessica Flavors and verbalized understanding. Patient responses:    Have you ever had a reaction to a flu vaccine? No  Do you have any current illness?  no  Have you ever had Guillian [place] Syndrome? No  Do you have a serious allergy to any of the follow: Neomycin, Polymyxin, Thimerosal, eggs or egg products? No    Flu vaccine given per order. Please see immunization tab. Risks and benefits explained. Current VIS given.       Immunizations Administered     Name Date Dose Route    Influenza, Quadv, IM, PF (6 mo and older Fluzone, Flulaval, Fluarix, and 3 yrs and older Afluria) 9/28/2020 0.5 mL Intramuscular    Site: Deltoid- Left    Lot: [id number]    NDC: 95527-945-61

## 2020-10-12 ENCOUNTER — HOSPITAL ENCOUNTER (OUTPATIENT)
Dept: WOMENS IMAGING | Age: 50
Discharge: HOME | End: 2020-10-12
Payer: COMMERCIAL

## 2020-10-12 PROCEDURE — 77063 BREAST TOMOSYNTHESIS BI: CPT

## 2020-10-31 ENCOUNTER — HOSPITAL ENCOUNTER (EMERGENCY)
Age: 50
Discharge: HOME | End: 2020-10-31
Payer: COMMERCIAL

## 2020-10-31 VITALS
SYSTOLIC BLOOD PRESSURE: 132 MMHG | WEIGHT: 260 LBS | HEART RATE: 90 BPM | RESPIRATION RATE: 18 BRPM | HEIGHT: 68 IN | DIASTOLIC BLOOD PRESSURE: 94 MMHG | BODY MASS INDEX: 39.4 KG/M2 | OXYGEN SATURATION: 100 % | TEMPERATURE: 98.1 F

## 2020-10-31 LAB
BACTERIA: NEGATIVE /HPF
BILIRUBIN URINE: NEGATIVE MG/DL
BLOOD, URINE: ABNORMAL
CLARITY: ABNORMAL
COLOR: YELLOW
GLUCOSE, URINE: NEGATIVE MG/DL
INTERPRETATION: NORMAL
KETONES, URINE: ABNORMAL MG/DL
LEUKOCYTE ESTERASE, URINE: ABNORMAL
NITRITE URINE, QUANTITATIVE: NEGATIVE
PH, URINE: 6 (ref 5–8)
PREGNANCY, URINE: NEGATIVE
PROTEIN UA: 100 MG/DL
RBC URINE: 2530 /HPF (ref 0–6)
SPECIFIC GRAVITY UA: 1.02 (ref 1–1.03)
SPECIFIC GRAVITY, URINE: 1.02 (ref 1–1.03)
SQUAMOUS EPITHELIAL: 14 /HPF
TRICHOMONAS: ABNORMAL /HPF
UROBILINOGEN, URINE: NORMAL MG/DL (ref 0.2–1)
WBC CLUMP: ABNORMAL /HPF
WBC UA: 668 /HPF (ref 0–5)

## 2020-10-31 PROCEDURE — 81001 URINALYSIS AUTO W/SCOPE: CPT

## 2020-10-31 PROCEDURE — 81025 URINE PREGNANCY TEST: CPT

## 2020-10-31 PROCEDURE — 6370000000 HC RX 637 (ALT 250 FOR IP): Performed by: PHYSICIAN ASSISTANT

## 2020-10-31 PROCEDURE — 99283 EMERGENCY DEPT VISIT LOW MDM: CPT

## 2020-10-31 RX ORDER — CEPHALEXIN 500 MG/1
500 CAPSULE ORAL 4 TIMES DAILY
Qty: 28 CAPSULE | Refills: 0 | Status: SHIPPED | OUTPATIENT
Start: 2020-10-31 | End: 2020-11-07

## 2020-10-31 RX ORDER — PHENAZOPYRIDINE HYDROCHLORIDE 100 MG/1
200 TABLET, FILM COATED ORAL ONCE
Status: COMPLETED | OUTPATIENT
Start: 2020-10-31 | End: 2020-10-31

## 2020-10-31 RX ORDER — CEPHALEXIN 250 MG/1
500 CAPSULE ORAL ONCE
Status: COMPLETED | OUTPATIENT
Start: 2020-10-31 | End: 2020-10-31

## 2020-10-31 RX ORDER — PHENAZOPYRIDINE HYDROCHLORIDE 100 MG/1
100 TABLET, FILM COATED ORAL 3 TIMES DAILY PRN
Qty: 10 TABLET | Refills: 0 | Status: SHIPPED | OUTPATIENT
Start: 2020-10-31 | End: 2020-11-03

## 2020-10-31 RX ADMIN — CEPHALEXIN 500 MG: 250 CAPSULE ORAL at 21:37

## 2020-10-31 RX ADMIN — PHENAZOPYRIDINE 200 MG: 100 TABLET ORAL at 21:37

## 2020-10-31 ASSESSMENT — PAIN DESCRIPTION - ORIENTATION: ORIENTATION: LOWER

## 2020-10-31 ASSESSMENT — PAIN DESCRIPTION - LOCATION: LOCATION: ABDOMEN

## 2020-10-31 ASSESSMENT — PAIN DESCRIPTION - PAIN TYPE: TYPE: ACUTE PAIN

## 2020-10-31 ASSESSMENT — PAIN SCALES - GENERAL: PAINLEVEL_OUTOF10: 10

## 2020-11-01 NOTE — ED PROVIDER NOTES
children: Not on file    Years of education: Not on file    Highest education level: Not on file   Occupational History    Not on file   Social Needs    Financial resource strain: Not on file    Food insecurity     Worry: Not on file     Inability: Not on file    Transportation needs     Medical: Not on file     Non-medical: Not on file   Tobacco Use    Smoking status: Never Smoker    Smokeless tobacco: Never Used   Substance and Sexual Activity    Alcohol use: Yes     Comment: occasionally    Drug use: No    Sexual activity: Not Currently     Partners: Male   Lifestyle    Physical activity     Days per week: Not on file     Minutes per session: Not on file    Stress: Not on file   Relationships    Social connections     Talks on phone: Not on file     Gets together: Not on file     Attends Rastafarian service: Not on file     Active member of club or organization: Not on file     Attends meetings of clubs or organizations: Not on file     Relationship status: Not on file    Intimate partner violence     Fear of current or ex partner: Not on file     Emotionally abused: Not on file     Physically abused: Not on file     Forced sexual activity: Not on file   Other Topics Concern    Not on file   Social History Narrative    Not on file     Current Facility-Administered Medications   Medication Dose Route Frequency Provider Last Rate Last Dose    cephALEXin (KEFLEX) capsule 500 mg  500 mg Oral Once [employer], PA-C        phenazopyridine (PYRIDIUM) tablet 200 mg  200 mg Oral Once [employer], PA-C        promethazine (PHENERGAN) injection 25 mg  25 mg Intramuscular Q6H PRN Cristian Raygoza MD   25 mg at 03/25/14 1543     Current Outpatient Medications   Medication Sig Dispense Refill    cephALEXin (KEFLEX) 500 MG capsule Take 1 capsule by mouth 4 times daily for 7 days 28 capsule 0    phenazopyridine (PYRIDIUM) 100 MG tablet Take 1 tablet by mouth 3 times daily as needed for Pain no tenderness to palpation over the chest wall or over ribs  Abdomen: Abdomen is soft nontender nondistended. There is no firm or pulsatile masses no rebound rigidity or guarding negative Mayen's negative McBurney, no peritoneal signs  Suprapubic:  there is no tenderness to palpation over the external bladder. No flank ttp  Musculoskeletal: 5 out of 5 strength in all 4 extremities full flexion extension abduction and adduction supination pronation of all extremities and all digits. No obvious muscle atrophy is noted. No focal muscle deficits are appreciated  Dermatology: Skin is warm and dry there is no obvious abscesses lacerations or lesions noted  Psych: Mentation is grossly normal cognition is grossly normal. Affect is appropriate  Neuro: Motor intact sensory intact cranial nerves II through XII are intact level of consciousness is normal cerebellar function is normal reflexes are grossly normal. No evidence of incontinence or loss of bowel or bladder no saddle anesthesia noted Lymphatic: There is no submandibular or cervical adenopathy appreciated.         I have reviewed and interpreted all of the currently available lab results from this visit (if applicable):  Results for orders placed or performed during the hospital encounter of 10/31/20   Urinalysis, reflex to microscopic   Result Value Ref Range    Color, UA YELLOW YELLOW    Clarity, UA SLIGHTLY CLOUDY (A) CLEAR    Glucose, Urine NEGATIVE NEGATIVE MG/DL    Bilirubin Urine NEGATIVE NEGATIVE MG/DL    Ketones, Urine SMALL (A) NEGATIVE MG/DL    Specific Gravity, UA 1.016 1.001 - 1.035    Blood, Urine LARGE (A) NEGATIVE    pH, Urine 6.0 5.0 - 8.0    Protein,  (A) NEGATIVE MG/DL    Urobilinogen, Urine NORMAL 0.2 - 1.0 MG/DL    Nitrite Urine, Quantitative NEGATIVE NEGATIVE    Leukocyte Esterase, Urine LARGE (A) NEGATIVE    RBC, UA 2,530 (H) 0 - 6 /HPF    WBC,  (H) 0 - 5 /HPF    Bacteria, UA NEGATIVE NEGATIVE /HPF    WBC Clumps, UA MODERATE /HPF Squam Epithel, UA 14 /HPF    Trichomonas, UA NONE SEEN NONE SEEN /HPF   HCG, Urine, Qualitative, Pregnancy (Lab)   Result Value Ref Range    Pregnancy, Urine NEGATIVE NEGATIVE    Specific Gravity, Urine 1.016 1.001 - 1.035    Interpretation HCG METHOD LIMITATIONS:       Radiographs (if obtained):  [] The following radiograph was interpreted by myself in the absence of a radiologist:   [] Radiologist's Report Reviewed:  No orders to display       EKG (if obtained):   Please See Note of attending physician for EKG interpretation. Chart review shows recent radiograph(s):  [facility] Digital Screen Bilateral    Result Date: 10/13/2020  EXAMINATION: SCREENING DIGITAL BILATERAL  MAMMOGRAM WITH TOMOSYNTHESIS, 10/12/2020 TECHNIQUE: Screening mammography was performed with tomosynthesis including MLO and CC views of the bilateral breasts. Computer aided detection was used for the interpretation of this exam. COMPARISON: 09/12/2019, 09/11/2018, 08/23/2017, 10/05/2016 HISTORY: Screening. History of breast cancer. No current acute breast issues. FINDINGS: The breasts are heterogeneously dense, which may obscure small masses. Stable postsurgical changes in the right breast and right axilla. No new dominant masses, suspicious microcalcifications or areas of architectural distortion. Stable exam with no mammographic evidence of malignancy. BIRADS: BIRADS - CATEGORY 2 Benign, no evidence of malignancy. Normal interval follow-up is recommended in 12 months. OVER ALL ASSESSMENT - BENIGN A letter of notification will be sent to the patient regarding the results. The [employer] recommend annual mammograms for women 40 years and older. MDM:   Pt presents with urinary symptoms. UTI seen on urinalysis. No systemic symptoms. No pyelnoephritis. Abdominal exam is benign on initial and repeat examinations. Will provide pt with Abx Rx. Pt is to be discharged home.  Pt is  to return immediately to the emergency department if he has any new, worrisome or worsening symptoms. Pt is to follow up with PCP within 2 days. Patient/Surrogate vocalizes agreement and understanding with this plan and he has no questions upon disposition. Pt is comfortable upon disposition home. Patient is stable, Patients vital signs are stable. Vital signs and nursing notes reviewed during ED course. I independently managed patient today in the ED. All pertinent Lab data and radiographic results reviewed with patient at bedside. The patient and/or the family were informed of the results of any tests/labs/imaging, the treatment plan, and time was allotted to answer questions. See chart for details of medications given during the ED stay. BP (!) 132/94   Pulse 90   Temp 98.1 °F (36.7 °C) (Oral)   Resp 18   Ht 5' 8\" (1.727 m)   Wt 260 lb (117.9 kg)   LMP  (LMP Unknown)   SpO2 100%   BMI 39.53 kg/m²       Clinical Impression:  1. Acute cystitis with hematuria        Disposition referral (if applicable):  Paola Rice MD  [id number]  [phone]    In 2 days      Disposition medications (if applicable):  New Prescriptions    CEPHALEXIN (KEFLEX) 500 MG CAPSULE    Take 1 capsule by mouth 4 times daily for 7 days    PHENAZOPYRIDINE (PYRIDIUM) 100 MG TABLET    Take 1 tablet by mouth 3 times daily as needed for Pain (dysuria)         Comment: Please note this report has been produced using speech recognition software and may contain errors related to that system including errors in grammar, punctuation, and spelling, as well as words and phrases that may be inappropriate. If there are any questions or concerns please feel free to contact the dictating provider for clarification.       CARRINGTON Saleem, [place]  10/31/20 8162

## 2020-11-05 ENCOUNTER — TELEPHONE (OUTPATIENT)
Dept: FAMILY MEDICINE CLINIC | Age: 50
End: 2020-11-05

## 2020-11-05 ENCOUNTER — OFFICE VISIT (OUTPATIENT)
Dept: FAMILY MEDICINE CLINIC | Age: 50
End: 2020-11-05
Payer: COMMERCIAL

## 2020-11-05 VITALS
WEIGHT: 261 LBS | TEMPERATURE: 97.3 F | DIASTOLIC BLOOD PRESSURE: 78 MMHG | HEART RATE: 59 BPM | BODY MASS INDEX: 39.56 KG/M2 | HEIGHT: 68 IN | SYSTOLIC BLOOD PRESSURE: 118 MMHG

## 2020-11-05 LAB
BILIRUBIN, POC: NEGATIVE
BLOOD URINE, POC: NEGATIVE
CLARITY, POC: ABNORMAL
COLOR, POC: YELLOW
GLUCOSE URINE, POC: NEGATIVE
KETONES, POC: NEGATIVE
LEUKOCYTE EST, POC: ABNORMAL
NITRITE, POC: NEGATIVE
PH, POC: 8
PROTEIN, POC: ABNORMAL
SPECIFIC GRAVITY, POC: 1.01
UROBILINOGEN, POC: ABNORMAL

## 2020-11-05 PROCEDURE — 81002 URINALYSIS NONAUTO W/O SCOPE: CPT | Performed by: FAMILY MEDICINE

## 2020-11-05 PROCEDURE — 99213 OFFICE O/P EST LOW 20 MIN: CPT | Performed by: FAMILY MEDICINE

## 2020-11-05 RX ORDER — SULFAMETHOXAZOLE AND TRIMETHOPRIM 800; 160 MG/1; MG/1
1 TABLET ORAL 2 TIMES DAILY
Qty: 14 TABLET | Refills: 0 | Status: SHIPPED | OUTPATIENT
Start: 2020-11-05 | End: 2020-11-05 | Stop reason: SDUPTHER

## 2020-11-05 RX ORDER — SULFAMETHOXAZOLE AND TRIMETHOPRIM 800; 160 MG/1; MG/1
1 TABLET ORAL 2 TIMES DAILY
Qty: 10 TABLET | Refills: 0 | Status: SHIPPED | OUTPATIENT
Start: 2020-11-05 | End: 2020-11-10

## 2020-11-05 ASSESSMENT — ENCOUNTER SYMPTOMS
SORE THROAT: 0
DIARRHEA: 0
VOMITING: 0
SHORTNESS OF BREATH: 0
COUGH: 0
EYE REDNESS: 0
NAUSEA: 0

## 2020-11-05 NOTE — PATIENT INSTRUCTIONS

## 2020-11-05 NOTE — TELEPHONE ENCOUNTER
Pharmacy called about the directions of the Bactrim 800-160 medication for the patient one twice a day for 10 doses but its a quantity of 14 tablets pharmacy was calling to verify that.  Please advise

## 2020-11-05 NOTE — PROGRESS NOTES
Patient ID: Ale Jensen 1970    Chief Complaint   Patient presents with    Urinary Tract Infection     burning, discomfort and itching with urination, discharge urine is turib    Cystitis     Patient was seen at ED on 10/31/2020 cystits with hematura         HPI history of present illness: See above. Was seen in the emergency room for urinary tract infection a few days ago. She was concerned because there was a large amount of blood. The antibiotics do not seem to be working that well. She still having some burning with urination. Review of Systems   Constitutional: Negative for chills, fatigue and fever. HENT: Negative for sore throat. No loss of taste or smell sensation   Eyes: Negative for redness. Respiratory: Negative for cough (no unusual) and shortness of breath (no unusual). Gastrointestinal: Negative for diarrhea, nausea and vomiting. Musculoskeletal: Negative for arthralgias and myalgias. Skin: Negative for rash. Neurological: Negative for weakness and headaches. Hematological: Does not bruise/bleed easily. Patient Active Problem List   Diagnosis    History of breast cancer    Hyperlipidemia    Headache    Asthma    Malignant neoplasm of right female breast ([address]    Impaired glucose tolerance    Essential hypertension    Left foot pain    Obesity, Class III, BMI 40-49.9 (morbid obesity) ([address]    Chronic pain of left lower extremity    Right-sided chest wall pain    Right lateral epicondylitis       Past Surgical History:   Procedure Laterality Date    BREAST BIOPSY  2009    COLONOSCOPY  2010    polyp. Repeat in 2015    COLONOSCOPY  12/12/2016    diverticulosis    ELBOW FRACTURE SURGERY Right 12/10/2019    RIGHT LATERAL EPICONDYLITIS TENDON DEBRIDEMENT, BOSWORTH PROCEDURE performed by Andres Rajput DO at [facility] HYSTERECTOMY      Cysts, Endometriosis.   1 ovary remains   [address] [facility] Family History   Problem Relation Age of Onset    Hypertension Father     Stroke Maternal Aunt     Diabetes Paternal Aunt     Diabetes Paternal Uncle     Stroke Maternal Grandmother     Depression Son     Asthma Son     Stroke Mother 79         in 2017   [place] Sister                Current Outpatient Medications on File Prior to Visit   Medication Sig Dispense Refill    cephALEXin (KEFLEX) 500 MG capsule Take 1 capsule by mouth 4 times daily for 7 days 28 capsule 0    atorvastatin (LIPITOR) 80 MG tablet Take 1 tablet by mouth daily 30 tablet 5    metoprolol succinate (TOPROL XL) 100 MG extended release tablet Take 1 tablet by mouth daily 30 tablet 5    QVAR REDIHALER 80 MCG/ACT AERB inhaler Inhale 2 puffs into the lungs 2 times daily 1 Inhaler 5    sertraline (ZOLOFT) 100 MG tablet Take 1 tablet by mouth daily 30 tablet 11    SUMAtriptan (IMITREX) 100 MG tablet Take 1 tablet by mouth once as needed for Migraine 9 tablet 11    albuterol sulfate (PROAIR RESPICLICK) 877 (90 Base) MCG/ACT aerosol powder inhalation Inhale 2 puffs into the lungs every 6 hours as needed for Shortness of Breath 1 Inhaler 2    ibuprofen (ADVIL;MOTRIN) 200 MG tablet Take 800 mg by mouth every 6 hours as needed for Pain Indications: taking PRN      Elastic Bandages & Supports (TENNIS ELBOW NEOPRENE BRACE) MISC Wear daily 1 each 0     Current Facility-Administered Medications on File Prior to Visit   Medication Dose Route Frequency Provider Last Rate Last Dose    promethazine (PHENERGAN) injection 25 mg  25 mg Intramuscular Q6H PRN Jamil Arreguin MD   25 mg at 14 1541                   Objective:           Physical Exam  Vitals signs and nursing note reviewed. Constitutional:       Appearance: She is well-developed. HENT:      Head: Normocephalic and atraumatic. Neck:      Musculoskeletal: Neck supple. Thyroid: No thyromegaly. Trachea: No tracheal deviation.    Cardiovascular:

## 2020-11-06 LAB — URINE CULTURE, ROUTINE: NORMAL

## 2020-12-28 ENCOUNTER — HOSPITAL ENCOUNTER (OUTPATIENT)
Age: 50
Setting detail: SPECIMEN
Discharge: HOME | End: 2020-12-28
Payer: COMMERCIAL

## 2020-12-28 ENCOUNTER — VIRTUAL VISIT (OUTPATIENT)
Dept: FAMILY MEDICINE CLINIC | Age: 50
End: 2020-12-28
Payer: COMMERCIAL

## 2020-12-28 PROCEDURE — U0002 COVID-19 LAB TEST NON-CDC: HCPCS

## 2020-12-28 PROCEDURE — 99213 OFFICE O/P EST LOW 20 MIN: CPT | Performed by: FAMILY MEDICINE

## 2020-12-28 NOTE — PROGRESS NOTES
2020    TELEHEALTH EVALUATION -- Audio/Visual (During [facility]-57 public health emergency)    HPI:    Emily Chavez (:  1970) has requested an audio/video evaluation for the following concern(s):    Concern for covid: fatigue x 5 days. Body aches on Thursday and Friday. Felt like the flu. Took cold and flu medication. Temp to 101.5. Now today with just runny nose and sneezing today. Her job wants her to check with doctor first before going in to work    Review of Systems   Constitutional: Positive for fatigue and fever. Musculoskeletal: Positive for myalgias. Prior to Visit Medications    Medication Sig Taking?  Authorizing Provider   atorvastatin (LIPITOR) 80 MG tablet Take 1 tablet by mouth daily Yes Paula Cho MD   metoprolol succinate (TOPROL XL) 100 MG extended release tablet Take 1 tablet by mouth daily Yes Paula Cho MD   sertraline (ZOLOFT) 100 MG tablet Take 1 tablet by mouth daily Yes Paula Cho MD   SUMAtriptan (IMITREX) 100 MG tablet Take 1 tablet by mouth once as needed for Migraine Yes Paula Cho MD   QVAR REDIHALER 80 MCG/ACT AERB inhaler Inhale 2 puffs into the lungs 2 times daily  Patient not taking: Reported on 2020  Paula Cho MD   albuterol sulfate (PROAIR RESPICLICK) 116 (90 Base) MCG/ACT aerosol powder inhalation Inhale 2 puffs into the lungs every 6 hours as needed for Shortness of Breath  Patient not taking: Reported on 2020  Paula Cho MD   ibuprofen (ADVIL;MOTRIN) 200 MG tablet Take 800 mg by mouth every 6 hours as needed for Pain Indications: taking PRN  Historical Provider, MD   Elastic Bandages & Supports ([address]) MISC Wear daily  Patient not taking: Reported on 2020  Paula Cho MD       Social History     Tobacco Use    Smoking status: Never Smoker    Smokeless tobacco: Never Used   Substance Use Topics    Alcohol use: Yes     Comment: occasionally    Drug use: No        Allergies Allergen Reactions    Aloe Vera Rash   ,   Past Medical History:   Diagnosis Date    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 Repeat in 2015    COLONOSCOPY  12/12/2016    diverticulosis    ELBOW FRACTURE SURGERY Right 12/10/2019    RIGHT LATERAL EPICONDYLITIS TENDON DEBRIDEMENT, BOSWORTH PROCEDURE performed by Megan Sharif DO at [facility] 73 HYSTERECTOMY      Cysts, Endometriosis. 1 ovary remains    TONSILLECTOMY AND ADENOIDECTOMY  1987    TUBAL LIGATION  1993       PHYSICAL EXAMINATION:  [ INSTRUCTIONS:  \"[x]\" Indicates a positive item  \"[]\" Indicates a negative item  -- DELETE ALL ITEMS NOT EXAMINED]  Vital Signs: (As obtained by patient/caregiver or practitioner observation)    Blood pressure-  Heart rate-    Respiratory rate-    Temperature-  Pulse oximetry-     Constitutional: [x] Appears well-developed and well-nourished [] No apparent distress      [] Abnormal-   Mental status  [] Alert and awake  [] Oriented to person/place/time [x]Able to follow commands      Eyes:  EOM    []  Normal  [] Abnormal-  Sclera  []  Normal  [] Abnormal -         Discharge []  None visible  [] Abnormal -    HENT:   [x] Normocephalic, atraumatic.   [] Abnormal   [] Mouth/Throat: Mucous membranes are moist.     External Ears [] Normal  [] Abnormal-     Neck: [x] No visualized mass     Pulmonary/Chest: [] Respiratory effort normal.  [] No visualized signs of difficulty breathing or respiratory distress [] Abnormal-      Musculoskeletal:   [x] Normal gait with no signs of ataxia         [] Normal range of motion of neck        [] Abnormal-       Neurological:        [x] No Facial Asymmetry (Cranial nerve 7 motor function) (limited exam to video visit)          [] No gaze palsy        [] Abnormal-         Skin:        [x] No significant exanthematous lesions or discoloration noted on facial skin         [] Abnormal-            Psychiatric:       [x] Normal Affect [x] No Hallucinations        [] Abnormal-     Other pertinent observable physical exam findings-     ASSESSMENT/PLAN:  There are no diagnoses linked to this encounter. Diagnosis Orders   1. Flu-like symptoms  COVID-19 Ambulatory     Quarantine until results come back. No additional medications needed since seems to be doing better. No follow-ups on file. Erma Goss is a 45 y.o. female being evaluated by a Virtual Visit (video visit) encounter to address concerns as mentioned above. A caregiver was present when appropriate. Due to this being a TeleHealth encounter (During [facility]-43 public health emergency), evaluation of the following organ systems was limited: Vitals/Constitutional/EENT/Resp/CV/GI//MS/Neuro/Skin/Heme-Lymph-Imm. Pursuant to the emergency declaration under the [facility], [address] [place] [address] authority and the [employer] and [employer] Act, this Virtual Visit was conducted with patient's (and/or legal guardian's) consent, to reduce the patient's risk of exposure to COVID-19 and provide necessary medical care. The patient (and/or legal guardian) has also been advised to contact this office for worsening conditions or problems, and seek emergency medical treatment and/or call 911 if deemed necessary.      Patient identification was verified at the start of the visit: Yes    Total time spent on this encounter: Not billed by time

## 2020-12-28 NOTE — PATIENT INSTRUCTIONS
PLEASE BRING YOUR MEDICATIONS TO ALL APPOINTMENTS    The diagnoses and medications listed in this after visit summary may not be accurate at the time of check out. Please check MY CHART in 28-48 hours for possible corrections. Late cancellation policy: So that we can better accommodate people who are sick, please give our office 24 hour notice for an appointment cancellation. Thank you. Missed appointments: Your care is very important to us. It is important that you keep your scheduled appointments. Multiple missed appointments will lead to a dismissal from the office. Later arrival policy: If you are more than 10 minutes late for your appointment, you will be asked to reschedule. Please allow 5-7 business days for paperwork to be processed. It is important that you check your MY Chart messages, as they include appointment reminders, test results, and other important information. If you have forgotten your password, please call [phone].          1. Take your blood pressure medications at night. This reduces your chance of cardiovascular event by half  2. Fever in kids: It's best to give both Tylenol and Ibuprofen at the same time rather than staggering them which is confusing  3. Pediatric obesity is decreased by less exposure to antibiotics, consuming whole milk instead of skim milk, watching public TV instead of regular TV, and experiencing fewer adverse childhood events  4. 1 egg per day is good for your heart  5. Alternate day fasting does promote weight loss. 6. Skipping breakfast increases your risk of obesity  7. Artificially sweetened drinks increase all cause mortality (strokes, BMI, cardiovascular)  8. Kale consumption can reduce onset of dementia  9. Walking at least 8000 steps per day and resistance exercise 2-3 x per week are good for your heart  10.  Covid 19:  Wearing gloves is not that helpful

## 2020-12-29 LAB
SARS-COV-2: DETECTED
SOURCE: ABNORMAL

## 2021-01-05 ENCOUNTER — VIRTUAL VISIT (OUTPATIENT)
Dept: FAMILY MEDICINE CLINIC | Age: 51
End: 2021-01-05
Payer: COMMERCIAL

## 2021-01-05 DIAGNOSIS — J45.909 UNCOMPLICATED ASTHMA, UNSPECIFIED ASTHMA SEVERITY, UNSPECIFIED WHETHER PERSISTENT: ICD-10-CM

## 2021-01-05 DIAGNOSIS — E66.01 OBESITY, CLASS III, BMI 40-49.9 (MORBID OBESITY) (HCC): ICD-10-CM

## 2021-01-05 DIAGNOSIS — F33.1 MODERATE EPISODE OF RECURRENT MAJOR DEPRESSIVE DISORDER (HCC): ICD-10-CM

## 2021-01-05 DIAGNOSIS — R73.02 IMPAIRED GLUCOSE TOLERANCE: ICD-10-CM

## 2021-01-05 DIAGNOSIS — E78.5 HYPERLIPIDEMIA, UNSPECIFIED HYPERLIPIDEMIA TYPE: Primary | ICD-10-CM

## 2021-01-05 DIAGNOSIS — U07.1 LAB TEST POSITIVE FOR DETECTION OF COVID-19 VIRUS: ICD-10-CM

## 2021-01-05 DIAGNOSIS — J45.20 MILD INTERMITTENT ASTHMA WITHOUT COMPLICATION: ICD-10-CM

## 2021-01-05 DIAGNOSIS — G43.909 MIGRAINE WITHOUT STATUS MIGRAINOSUS, NOT INTRACTABLE, UNSPECIFIED MIGRAINE TYPE: ICD-10-CM

## 2021-01-05 DIAGNOSIS — I10 ESSENTIAL HYPERTENSION: ICD-10-CM

## 2021-01-05 PROCEDURE — 99214 OFFICE O/P EST MOD 30 MIN: CPT | Performed by: FAMILY MEDICINE

## 2021-01-05 RX ORDER — SERTRALINE HYDROCHLORIDE 100 MG/1
100 TABLET, FILM COATED ORAL DAILY
Qty: 30 TABLET | Refills: 2 | Status: SHIPPED | OUTPATIENT
Start: 2021-01-05 | End: 2021-07-20

## 2021-01-05 RX ORDER — BECLOMETHASONE DIPROPIONATE HFA 80 UG/1
2 AEROSOL, METERED RESPIRATORY (INHALATION) 2 TIMES DAILY
Qty: 1 INHALER | Refills: 2 | Status: SHIPPED | OUTPATIENT
Start: 2021-01-05 | End: 2021-07-20 | Stop reason: SDUPTHER

## 2021-01-05 RX ORDER — METOPROLOL SUCCINATE 100 MG/1
100 TABLET, EXTENDED RELEASE ORAL DAILY
Qty: 30 TABLET | Refills: 2 | Status: SHIPPED | OUTPATIENT
Start: 2021-01-05 | End: 2021-07-20 | Stop reason: SDUPTHER

## 2021-01-05 RX ORDER — ATORVASTATIN CALCIUM 80 MG/1
80 TABLET, FILM COATED ORAL DAILY
Qty: 30 TABLET | Refills: 2 | Status: SHIPPED | OUTPATIENT
Start: 2021-01-05 | End: 2021-07-20 | Stop reason: SDUPTHER

## 2021-01-05 RX ORDER — SUMATRIPTAN 100 MG/1
100 TABLET, FILM COATED ORAL
Qty: 9 TABLET | Refills: 2 | Status: SHIPPED | OUTPATIENT
Start: 2021-01-05 | End: 2021-01-11 | Stop reason: SDUPTHER

## 2021-01-05 ASSESSMENT — PATIENT HEALTH QUESTIONNAIRE - PHQ9
2. FEELING DOWN, DEPRESSED OR HOPELESS: 0
SUM OF ALL RESPONSES TO PHQ QUESTIONS 1-9: 0
SUM OF ALL RESPONSES TO PHQ QUESTIONS 1-9: 0

## 2021-01-05 ASSESSMENT — ENCOUNTER SYMPTOMS: SHORTNESS OF BREATH: 1

## 2021-01-05 NOTE — PATIENT INSTRUCTIONS
Having low vitamin D levels increases risk of infection (take 2-4000 units of D3 per day until our covid crisis is resolved)  11.  Brushing teeth 3 times per day can decrease chance of getting diabetes

## 2021-01-05 NOTE — PROGRESS NOTES
ibuprofen (ADVIL;MOTRIN) 200 MG tablet Take 800 mg by mouth every 6 hours as needed for Pain Indications: taking PRN  Historical Provider, MD   Elastic Bandages & Supports ([address]) MISC Wear daily  Patient not taking: Reported on 12/28/2020  Leyda Moyer MD       Social History     Tobacco Use    Smoking status: Never Smoker    Smokeless tobacco: Never Used   Substance Use Topics    Alcohol use: Yes     Comment: occasionally    Drug use: No        Allergies   Allergen Reactions    Aloe Vera Rash   ,   Past Medical History:   Diagnosis Date    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 Repeat in 2015    COLONOSCOPY  12/12/2016    diverticulosis    ELBOW FRACTURE SURGERY Right 12/10/2019    RIGHT LATERAL EPICONDYLITIS TENDON DEBRIDEMENT, BOSWORTH PROCEDURE performed by Amira Harp DO at [facility] 73 HYSTERECTOMY      Cysts, Endometriosis.   1 ovary remains    TONSILLECTOMY AND ADENOIDECTOMY  1987    TUBAL LIGATION  1993       PHYSICAL EXAMINATION:  [ INSTRUCTIONS:  \"[x]\" Indicates a positive item  \"[]\" Indicates a negative item  -- DELETE ALL ITEMS NOT EXAMINED]  Vital Signs: (As obtained by patient/caregiver or practitioner observation)    Blood pressure-  Heart rate-    Respiratory rate-    Temperature-  Pulse oximetry-     Constitutional: [x] Appears well-developed and well-nourished [] No apparent distress      [] Abnormal-   Mental status Covid positive: Continue quarantine until later this week    Asthma may be exacerbated by Covid. Continue with albuterol as needed. Return in about 3 months (around 4/19/2021) for HTN, Hyperlipid, Fasting, Headache, In office. 9:00. Jessica Aggarwal is a 45 y.o. female being evaluated by a Virtual Visit (video visit) encounter to address concerns as mentioned above. A caregiver was present when appropriate. Due to this being a TeleHealth encounter (During [facility]-69 public health emergency), evaluation of the following organ systems was limited: Vitals/Constitutional/EENT/Resp/CV/GI//MS/Neuro/Skin/Heme-Lymph-Imm. Pursuant to the emergency declaration under the [address] [place] [address] authority and the [employer] and [employer] Act, this Virtual Visit was conducted with patient's (and/or legal guardian's) consent, to reduce the patient's risk of exposure to COVID-19 and provide necessary medical care. The patient (and/or legal guardian) has also been advised to contact this office for worsening conditions or problems, and seek emergency medical treatment and/or call 911 if deemed necessary. Patient identification was verified at the start of the visit: Yes    Total time spent on this encounter: Not billed by time    Services were provided through a video synchronous discussion virtually to substitute for in-person clinic visit. Patient and provider were located at their individual homes. --Brissa Jay MD on 1/5/2021 at 4:33 PM    An electronic signature was used to authenticate this note.

## 2021-01-11 ENCOUNTER — TELEPHONE (OUTPATIENT)
Dept: FAMILY MEDICINE CLINIC | Age: 51
End: 2021-01-11

## 2021-01-11 DIAGNOSIS — G43.909 MIGRAINE WITHOUT STATUS MIGRAINOSUS, NOT INTRACTABLE, UNSPECIFIED MIGRAINE TYPE: ICD-10-CM

## 2021-01-11 RX ORDER — SUMATRIPTAN 100 MG/1
100 TABLET, FILM COATED ORAL 2 TIMES DAILY PRN
Qty: 9 TABLET | Refills: 2 | Status: SHIPPED | OUTPATIENT
Start: 2021-01-11 | End: 2021-07-20 | Stop reason: SDUPTHER

## 2021-05-03 RX ORDER — TRIAMTERENE AND HYDROCHLOROTHIAZIDE 37.5; 25 MG/1; MG/1
CAPSULE ORAL
Qty: 30 CAPSULE | Refills: 0 | OUTPATIENT
Start: 2021-05-03

## 2021-05-10 DIAGNOSIS — J45.20 MILD INTERMITTENT ASTHMA WITHOUT COMPLICATION: ICD-10-CM

## 2021-05-10 RX ORDER — ALBUTEROL SULFATE 90 UG/1
2 POWDER, METERED RESPIRATORY (INHALATION) EVERY 6 HOURS PRN
Qty: 1 INHALER | Refills: 0 | Status: SHIPPED | OUTPATIENT
Start: 2021-05-10 | End: 2021-10-20 | Stop reason: SDUPTHER

## 2021-05-13 DIAGNOSIS — J45.20 MILD INTERMITTENT ASTHMA WITHOUT COMPLICATION: Primary | ICD-10-CM

## 2021-05-13 RX ORDER — ALBUTEROL SULFATE 90 UG/1
2 AEROSOL, METERED RESPIRATORY (INHALATION) EVERY 4 HOURS PRN
Qty: 1 INHALER | Refills: 0 | Status: SHIPPED | OUTPATIENT
Start: 2021-05-13 | End: 2021-07-20 | Stop reason: SDUPTHER

## 2021-07-01 ENCOUNTER — OFFICE VISIT (OUTPATIENT)
Dept: FAMILY MEDICINE CLINIC | Age: 51
End: 2021-07-01
Payer: COMMERCIAL

## 2021-07-01 ENCOUNTER — HOSPITAL ENCOUNTER (OUTPATIENT)
Age: 51
Setting detail: SPECIMEN
Discharge: HOME | End: 2021-07-01
Payer: COMMERCIAL

## 2021-07-01 VITALS
WEIGHT: 264.8 LBS | HEART RATE: 69 BPM | SYSTOLIC BLOOD PRESSURE: 124 MMHG | DIASTOLIC BLOOD PRESSURE: 90 MMHG | BODY MASS INDEX: 40.26 KG/M2 | OXYGEN SATURATION: 98 % | TEMPERATURE: 97.2 F

## 2021-07-01 DIAGNOSIS — N89.8 VAGINAL DISCHARGE: Primary | ICD-10-CM

## 2021-07-01 DIAGNOSIS — R35.0 URINARY FREQUENCY: ICD-10-CM

## 2021-07-01 PROCEDURE — 99213 OFFICE O/P EST LOW 20 MIN: CPT | Performed by: PHYSICIAN ASSISTANT

## 2021-07-01 PROCEDURE — 87510 GARDNER VAG DNA DIR PROBE: CPT

## 2021-07-01 PROCEDURE — 87480 CANDIDA DNA DIR PROBE: CPT

## 2021-07-01 PROCEDURE — 87660 TRICHOMONAS VAGIN DIR PROBE: CPT

## 2021-07-01 NOTE — PROGRESS NOTES
2021    [facility]    Chief Complaint   Patient presents with   Jude Loser Vaginal Discharge     was clear, now becoming greenish yellow, x almost 2 weeks ago       HPI  History was obtained from patient. Moon Darling is a 45 y.o. female who presents today with complaints of vaginal discharge x 2 weeks. She states the discharge started out as a clear color and is now greenish-yellow. She also admits urinary frequency. She denies urgency, dysuria, hematuria, abdominal pain, pelvic pain, flank pain, nausea, vomiting, fever, or chills. She states there is no possibility of foreign body within the vagina. She is  but has concerns for STIs.         PAST MEDICAL HISTORY  Past Medical History:   Diagnosis Date    Arthritis     in right elbow    Asthma     last exacerbation     Colon polyp     Depression     Diverticulosis     colonoscopy     Endometriosis     History of breast cancer 2009    lumpectomy r side chemo and radiation     HTN (hypertension) 2013    Hyperlipidemia 10/2012    Migraines 11/15/2019    LAST MIGRAINE 11/15/19    Ovarian cyst     Pre-diabetes     no meds    Seasonal allergies     Uterine fibroid        FAMILY HISTORY  Family History   Problem Relation Age of Onset    Hypertension Father     Stroke Maternal Aunt     Diabetes Paternal Aunt     Diabetes Paternal Uncle     Stroke Maternal Grandmother     Depression Son     Asthma Son     Stroke Mother 79         in 2017    Lung Cancer Sister                SOCIAL HISTORY  Social History     Socioeconomic History    Marital status:      Spouse name: None    Number of children: None    Years of education: None    Highest education level: None   Occupational History    None   Tobacco Use    Smoking status: Never Smoker    Smokeless tobacco: Never Used   Vaping Use    Vaping Use: Never used   Substance and Sexual Activity    Alcohol use: Yes     Comment: occasionally    Drug use: No    Sexual activity: Not Currently     Partners: Male   Other Topics Concern    None   Social History Narrative    None     Social Determinants of Health     Financial Resource Strain:     Difficulty of Paying Living Expenses:    Food Insecurity:     Worried About Running Out of Food in the Last Year:     Ran Out of Food in the Last Year:    Transportation Needs:     Lack of Transportation (Medical):  Lack of Transportation (Non-Medical):    Physical Activity:     Days of Exercise per Week:     Minutes of Exercise per Session:    Stress:     Feeling of Stress :    Social Connections:     Frequency of Communication with Friends and Family:     Frequency of Social Gatherings with Friends and Family:     Attends [employer] Services:     Active Member of Clubs or Organizations:     Attends Club or Organization Meetings:     Marital Status:    Intimate Partner Violence:     Fear of Current or Ex-Partner:     Emotionally Abused:     Physically Abused:     Sexually Abused:         SURGICAL HISTORY  Past Surgical History:   Procedure Laterality Date    BREAST BIOPSY  2009    COLONOSCOPY  2010    polyp. Repeat in 2015    COLONOSCOPY  12/12/2016    diverticulosis    ELBOW FRACTURE SURGERY Right 12/10/2019    RIGHT LATERAL EPICONDYLITIS TENDON DEBRIDEMENT, BOSWORTH PROCEDURE performed by Amirah Grey DO at [facility] 73 HYSTERECTOMY      Cysts, Endometriosis.   1 ovary remains    TONSILLECTOMY AND ADENOIDECTOMY  1987    TUBAL LIGATION  1993       CURRENT MEDICATIONS  Current Outpatient Medications   Medication Sig Dispense Refill    albuterol sulfate HFA (VENTOLIN HFA) 108 (90 Base) MCG/ACT inhaler Inhale 2 puffs into the lungs every 4 hours as needed for Wheezing 1 Inhaler 0    albuterol sulfate (PROAIR RESPICLICK) 283 (90 Base) MCG/ACT aerosol powder inhalation Inhale 2 puffs into the lungs every 6 hours as needed for Shortness of Breath 1 Inhaler 0    SUMAtriptan (IMITREX) 100 MG tablet Take 1 tablet by mouth 2 times daily as needed for Migraine 9 tablet 2    atorvastatin (LIPITOR) 80 MG tablet Take 1 tablet by mouth daily 30 tablet 2    metoprolol succinate (TOPROL XL) 100 MG extended release tablet Take 1 tablet by mouth daily 30 tablet 2    QVAR REDIHALER 80 MCG/ACT AERB inhaler Inhale 2 puffs into the lungs 2 times daily 1 Inhaler 2    sertraline (ZOLOFT) 100 MG tablet Take 1 tablet by mouth daily (Patient taking differently: Take 100 mg by mouth daily as needed ) 30 tablet 2    Elastic Bandages & Supports (TENNIS ELBOW NEOPRENE BRACE) MISC Wear daily 1 each 0    ibuprofen (ADVIL;MOTRIN) 200 MG tablet Take 800 mg by mouth every 6 hours as needed for Pain Indications: taking PRN (Patient not taking: Reported on 7/1/2021)       Current Facility-Administered Medications   Medication Dose Route Frequency Provider Last Rate Last Admin    promethazine (PHENERGAN) injection 25 mg  25 mg Intramuscular Q6H PRN Raven Lal MD   25 mg at 03/25/14 1541       ALLERGIES  Allergies   Allergen Reactions    Aloe Vera Rash       PHYSICAL EXAM    BP (!) 124/90   Pulse 69   Temp 97.2 °F (36.2 °C) (Infrared)   Wt 264 lb 12.8 oz (120.1 kg)   LMP  (LMP Unknown)   SpO2 98%   BMI 40.26 kg/m²     Constitutional:  Well developed, well nourished  HENT:  Normocephalic, atraumatic  Eyes:  conjunctiva normal, no discharge, no scleral icterus  Cardiovascular:  Normal heart rate, normal rhythm, no murmurs, gallops or rubs  Thorax & Lungs:  Normal breath sounds, no respiratory distress, no wheezing  : Yellow to greenish, malodorous, thin vaginal discharge present on exam.  Otherwise normal appearing genitalia. Discharge collected for culture. Patient tolerated exam well. Skin:  Warm, dry, no erythema, no rash  Back:  No CVA tenderness  Neurologic:  Alert & oriented  Psychiatric:  Affect normal, mood normal    ASSESSMENT & PLAN    Seun Rizo was seen today for vaginal discharge.     Diagnoses and all orders for this visit:    Vaginal discharge  -     VAGINAL PATHOGENS PROBE *A  -     C.trachomatis N.gonorrhoeae DNA, Urine    Urinary frequency  -     Culture, Urine    Labs pending to check for UTI, vaginal candidiasis, bacterial vaginosis, trichomonas, gonorrhea, and chlamydia. We will not start treatment at this time. Patient understands that I will have the next 4 consecutive days off of work. She may call the office this Saturday for test results. If results are not available on Saturday, she will hear from our office on Tuesday as we are closed Monday for the holiday. She was encouraged to refrain from sexual intercourse at this time. There are no discontinued medications. No follow-ups on file. Plan of care reviewed with patient who verbalizes understanding and wishes to continue. Patient verbalizes understanding with the above plan and is in agreement. Patient will call with  worsening of symptoms, questions or concerns. Please note that this chart was generated using dragon dictation software. Although every effort was made to ensure the accuracy of this automated transcription, some errors in transcription may have occurred.     Electronically signed by Katarzyna Peters PA-C on 7/1/2021

## 2021-07-03 LAB — URINE CULTURE, ROUTINE: NORMAL

## 2021-07-04 LAB
C. TRACHOMATIS DNA ,URINE: NEGATIVE
N. GONORRHOEAE DNA, URINE: NEGATIVE

## 2021-07-05 LAB
CANDIDA SPECIES, DNA PROBE: NEGATIVE
GARNDINELLA VAGINALIS DNA: NEGATIVE
TRICHOMONAS VAGINALIS DNA: POSITIVE

## 2021-07-06 DIAGNOSIS — A59.9 TRICHOMONOSIS: Primary | ICD-10-CM

## 2021-07-06 RX ORDER — METRONIDAZOLE 500 MG/1
500 TABLET ORAL 2 TIMES DAILY
Qty: 14 TABLET | Refills: 0 | Status: SHIPPED | OUTPATIENT
Start: 2021-07-06 | End: 2021-07-13

## 2021-07-20 ENCOUNTER — OFFICE VISIT (OUTPATIENT)
Dept: FAMILY MEDICINE CLINIC | Age: 51
End: 2021-07-20
Payer: COMMERCIAL

## 2021-07-20 VITALS
WEIGHT: 260 LBS | TEMPERATURE: 97.3 F | HEIGHT: 68 IN | HEART RATE: 65 BPM | DIASTOLIC BLOOD PRESSURE: 78 MMHG | SYSTOLIC BLOOD PRESSURE: 118 MMHG | BODY MASS INDEX: 39.4 KG/M2

## 2021-07-20 DIAGNOSIS — J45.20 MILD INTERMITTENT ASTHMA WITHOUT COMPLICATION: ICD-10-CM

## 2021-07-20 DIAGNOSIS — G43.909 MIGRAINE WITHOUT STATUS MIGRAINOSUS, NOT INTRACTABLE, UNSPECIFIED MIGRAINE TYPE: ICD-10-CM

## 2021-07-20 DIAGNOSIS — R73.02 IMPAIRED GLUCOSE TOLERANCE: ICD-10-CM

## 2021-07-20 DIAGNOSIS — R07.9 CHEST PAIN, UNSPECIFIED TYPE: Primary | ICD-10-CM

## 2021-07-20 DIAGNOSIS — R94.31 ABNORMAL EKG: ICD-10-CM

## 2021-07-20 DIAGNOSIS — I10 ESSENTIAL HYPERTENSION: ICD-10-CM

## 2021-07-20 DIAGNOSIS — G44.209 TENSION HEADACHE: ICD-10-CM

## 2021-07-20 DIAGNOSIS — F51.04 PSYCHOPHYSIOLOGICAL INSOMNIA: ICD-10-CM

## 2021-07-20 DIAGNOSIS — E78.5 HYPERLIPIDEMIA, UNSPECIFIED HYPERLIPIDEMIA TYPE: ICD-10-CM

## 2021-07-20 LAB
A/G RATIO: 1.6 (ref 1.1–2.2)
ALBUMIN SERPL-MCNC: 4.5 G/DL (ref 3.4–5)
ALP BLD-CCNC: 72 U/L (ref 40–129)
ALT SERPL-CCNC: 23 U/L (ref 10–40)
ANION GAP SERPL CALCULATED.3IONS-SCNC: 14 MMOL/L (ref 3–16)
AST SERPL-CCNC: 25 U/L (ref 15–37)
BILIRUB SERPL-MCNC: 0.8 MG/DL (ref 0–1)
BUN BLDV-MCNC: 8 MG/DL (ref 7–20)
CALCIUM SERPL-MCNC: 9.7 MG/DL (ref 8.3–10.6)
CHLORIDE BLD-SCNC: 104 MMOL/L (ref 99–110)
CHOLESTEROL, TOTAL: 238 MG/DL (ref 0–199)
CO2: 24 MMOL/L (ref 21–32)
CREAT SERPL-MCNC: 0.6 MG/DL (ref 0.6–1.1)
GFR AFRICAN AMERICAN: >60
GFR NON-AFRICAN AMERICAN: >60
GLOBULIN: 2.9 G/DL
GLUCOSE BLD-MCNC: 89 MG/DL (ref 70–99)
HDLC SERPL-MCNC: 55 MG/DL (ref 40–60)
LDL CHOLESTEROL CALCULATED: 148 MG/DL
POTASSIUM SERPL-SCNC: 4 MMOL/L (ref 3.5–5.1)
SODIUM BLD-SCNC: 142 MMOL/L (ref 136–145)
TOTAL PROTEIN: 7.4 G/DL (ref 6.4–8.2)
TRIGL SERPL-MCNC: 176 MG/DL (ref 0–150)
VLDLC SERPL CALC-MCNC: 35 MG/DL

## 2021-07-20 PROCEDURE — 99215 OFFICE O/P EST HI 40 MIN: CPT | Performed by: FAMILY MEDICINE

## 2021-07-20 PROCEDURE — 93000 ELECTROCARDIOGRAM COMPLETE: CPT | Performed by: FAMILY MEDICINE

## 2021-07-20 PROCEDURE — 36415 COLL VENOUS BLD VENIPUNCTURE: CPT | Performed by: FAMILY MEDICINE

## 2021-07-20 RX ORDER — SUMATRIPTAN 100 MG/1
100 TABLET, FILM COATED ORAL 2 TIMES DAILY PRN
Qty: 9 TABLET | Refills: 2 | Status: SHIPPED | OUTPATIENT
Start: 2021-07-20 | End: 2021-08-04 | Stop reason: SDUPTHER

## 2021-07-20 RX ORDER — ALBUTEROL SULFATE 90 UG/1
2 AEROSOL, METERED RESPIRATORY (INHALATION) EVERY 4 HOURS PRN
Qty: 1 INHALER | Refills: 1 | Status: SHIPPED | OUTPATIENT
Start: 2021-07-20 | End: 2021-08-04 | Stop reason: SDUPTHER

## 2021-07-20 RX ORDER — AMITRIPTYLINE HYDROCHLORIDE 50 MG/1
50 TABLET, FILM COATED ORAL NIGHTLY
Qty: 30 TABLET | Refills: 0 | Status: SHIPPED | OUTPATIENT
Start: 2021-07-20 | End: 2021-08-03

## 2021-07-20 RX ORDER — ATORVASTATIN CALCIUM 80 MG/1
80 TABLET, FILM COATED ORAL DAILY
Qty: 30 TABLET | Refills: 2 | Status: SHIPPED | OUTPATIENT
Start: 2021-07-20 | End: 2021-08-04 | Stop reason: SDUPTHER

## 2021-07-20 RX ORDER — METOPROLOL SUCCINATE 100 MG/1
100 TABLET, EXTENDED RELEASE ORAL DAILY
Qty: 30 TABLET | Refills: 2 | Status: SHIPPED | OUTPATIENT
Start: 2021-07-20 | End: 2021-08-04 | Stop reason: SDUPTHER

## 2021-07-20 ASSESSMENT — ENCOUNTER SYMPTOMS: SHORTNESS OF BREATH: 0

## 2021-07-20 NOTE — PATIENT INSTRUCTIONS
Need help scheduling your covid vaccine? [address] [phone]       PLEASE BRING YOUR MEDICATIONS TO ALL APPOINTMENTS    The diagnoses and medications listed in this after visit summary may not be accurate at the time of check out. Please check MY CHART in 28-48 hours for possible corrections. Late cancellation policy: So that we can better accommodate people who are sick, please give our office 24 hour notice for an appointment cancellation. Thank you. Missed appointments: Your care is very important to us. It is important that you keep your scheduled appointments. Multiple missed appointments will lead to a dismissal from the office. Later arrival policy: If you are more than 10 minutes late for your appointment, you will be asked to re-schedule. Please allow 5-7 business days for paperwork to be processed. It is important that you check your MY Chart messages, as they include appointment reminders, test results, and other important information. If you have forgotten your password, please call [phone]. HERE ARE SOME LIFE CHANGING TIPS      1. Take your blood pressure medications at bedtime. This reduces your chance of cardiovascular event by half  2. Fever in kids:  Give both Tylenol and Ibuprofen at the same time rather than staggering them which is confusing  3. Follow these tips to reduce childhood obesity: Reduce unnecessary exposure to antibiotics, consume whole milk instead of skim milk, watch public TV instead of regular TV (less exposure to junk food commercials), and reduce traumatic experiences. 4. 1 egg per day is good for your heart  5. Alternate day fasting does promote weight loss. Skipping breakfast increases your risk of obesity  6. Artificially sweetened drinks increase all cause mortality (strokes, BMI, cardiovascular)  7. Kale consumption can reduce onset of dementia  8.  Walking at least 8000 steps per day and resistance exercise 2-3 x per week are good for your heart  9.  Brushing teeth 3 times per day can decrease chance of getting diabetes

## 2021-07-20 NOTE — PROGRESS NOTES
Patient ID: Myles Sethi 1970    . Chief Complaint   Patient presents with    Headache     sharp pains on both sides of head moves to the back of head, ringing ear, stiff neck mostly on left side. x 1 month         Headache   This is a recurrent problem. The current episode started more than 1 month ago. The problem occurs daily. The problem has been gradually worsening. The pain is located in the parietal, temporal, occipital and bilateral region. The pain is moderate. Exacerbated by: stress, lack of sleep. She has tried nothing for the symptoms. The treatment provided significant relief. Her past medical history is significant for migraine headaches. Mental Health Problem  The primary symptoms do not include dysphoric mood. Primary symptoms comment: stressed because just put her father in a nursing home and her  is dyin from colon cancer. is still wondering if it was a mistake that she tested positive for STD (from her husban). The current episode started more than 1 month ago. This is a new problem. The onset of the illness is precipitated by a stressful event and emotional stress. Additional symptoms of the illness include headaches. She does not admit to suicidal ideas. She does not have a plan to attempt suicide. She has not already injured self. Asthma  There is no shortness of breath. This is a chronic problem. The current episode started more than 1 year ago. The problem occurs rarely. Associated symptoms include headaches. Pertinent negatives include no chest pain. Her symptoms are alleviated by beta-agonist. Her past medical history is significant for asthma. Hyperlipidemia  This is a chronic problem. The current episode started more than 1 year ago. The problem is uncontrolled. Recent lipid tests were reviewed and are high. Pertinent negatives include no chest pain or shortness of breath. Current antihyperlipidemic treatment includes statins.  The current treatment provides mild improvement of lipids. Compliance problems include psychosocial issues. Risk factors for coronary artery disease include obesity, stress and a sedentary lifestyle. Hypertension  This is a chronic problem. The current episode started more than 1 year ago. Associated symptoms include headaches. Pertinent negatives include no chest pain, palpitations or shortness of breath. Past treatments include beta blockers and calcium channel blockers. Review of Systems   Respiratory: Negative for shortness of breath. Cardiovascular: Negative for chest pain and palpitations. Neurological: Positive for headaches. Psychiatric/Behavioral: Negative for dysphoric mood. Patient Active Problem List   Diagnosis    History of breast cancer    Hyperlipidemia    Headache    Asthma    Malignant neoplasm of right female breast ([address]    Impaired glucose tolerance    Essential hypertension    Left foot pain    Obesity, Class III, BMI 40-49.9 (morbid obesity) [address] [address]    Chronic pain of left lower extremity    Right-sided chest wall pain    Right lateral epicondylitis       Past Surgical History:   Procedure Laterality Date    BREAST BIOPSY      COLONOSCOPY      polyp. Repeat in     COLONOSCOPY  2016    diverticulosis    ELBOW FRACTURE SURGERY Right 12/10/2019    RIGHT LATERAL EPICONDYLITIS TENDON DEBRIDEMENT, BOSWORTH PROCEDURE performed by Herminia Tran DO at [facility] 73 HYSTERECTOMY      Cysts, Endometriosis.   1 ovary remains    TONSILLECTOMY AND ADENOIDECTOMY      TUBAL LIGATION         Family History   Problem Relation Age of Onset    Hypertension Father     Stroke Maternal Aunt     Diabetes Paternal Aunt     Diabetes Paternal Uncle     Stroke Maternal Grandmother     Depression Son     Asthma Son     Stroke Mother 79         in 2017   Luann Moreno Sister                Current Outpatient Medications on File Prior to Visit   Medication Sig Dispense Refill    ibuprofen (ADVIL;MOTRIN) 200 MG tablet Take 800 mg by mouth every 6 hours as needed for Pain Indications: taking PRN       Elastic Bandages & Supports (TENNIS ELBOW NEOPRENE BRACE) MISC Wear daily 1 each 0    albuterol sulfate (PROAIR RESPICLICK) 997 (90 Base) MCG/ACT aerosol powder inhalation Inhale 2 puffs into the lungs every 6 hours as needed for Shortness of Breath 1 Inhaler 0     Current Facility-Administered Medications on File Prior to Visit   Medication Dose Route Frequency Provider Last Rate Last Admin    promethazine (PHENERGAN) injection 25 mg  25 mg Intramuscular Q6H PRN Edwige Arias MD   25 mg at 03/25/14 1541                   Objective:         Physical Exam  Vitals and nursing note reviewed. Constitutional:       General: She is not in acute distress. Appearance: She is well-developed. She is morbidly obese. HENT:      Head: Normocephalic and atraumatic. Eyes:      Pupils: Pupils are equal, round, and reactive to light. Cardiovascular:      Rate and Rhythm: Normal rate and regular rhythm. Heart sounds: Normal heart sounds, S1 normal and S2 normal.   Pulmonary:      Effort: Pulmonary effort is normal. No respiratory distress. Breath sounds: Normal breath sounds. No wheezing. Musculoskeletal:      Cervical back: Neck supple. Skin:     General: Skin is warm and dry. Neurological:      Mental Status: She is alert and oriented to person, place, and time. Cranial Nerves: No cranial nerve deficit. Motor: No tremor. Deep Tendon Reflexes:      Reflex Scores:       Patellar reflexes are 2+ on the right side and 2+ on the left side. Comments: . Psychiatric:         Attention and Perception: She is attentive. Mood and Affect: Mood is depressed. Affect is tearful. Speech: Speech normal.         Behavior: Behavior normal.         Thought Content:  Thought content normal.         Judgment: Judgment normal.       Vitals:    07/20/21 1610   BP: 80 MCG/ACT AERB inhaler   7. Migraine without status migrainosus, not intractable, unspecified migraine type  SUMAtriptan (IMITREX) 100 MG tablet   8. Psychophysiological insomnia  amitriptyline (ELAVIL) 50 MG tablet   9. Tension headache  amitriptyline (ELAVIL) 50 MG tablet           Plan:      Hypertension stable. Continue metoprolol    Asthma stable with Qvar and albuterol. Continue these medications    Continue with the as needed Imitrex for the migraine headaches    I think patient is having stress headaches at nighttime. We will start her on amitriptyline    Warned her about her frequent cancellations. Makes taking care of her when she comes back but much more difficult. Recheck in 2 weeks for the chest pain and the headaches. Since she stopped Zoloft, will restart her on amitriptyline. I believe she should not have stopped the zoloft as she seems to be extremely stressed now      Time seeing patient and working on chart: 1 hour        Return in about 3 months (around 10/20/2021) for HTN, Hyperlipid, Headache, Anxiety.

## 2021-07-21 LAB
ESTIMATED AVERAGE GLUCOSE: 114 MG/DL
HBA1C MFR BLD: 5.6 %

## 2021-07-29 ENCOUNTER — HOSPITAL ENCOUNTER (OUTPATIENT)
Dept: NUCLEAR MEDICINE | Age: 51
Discharge: HOME | End: 2021-07-29
Payer: COMMERCIAL

## 2021-07-29 ENCOUNTER — HOSPITAL ENCOUNTER (OUTPATIENT)
Dept: NON INVASIVE DIAGNOSTICS | Age: 51
Discharge: HOME | End: 2021-07-29
Payer: COMMERCIAL

## 2021-07-29 DIAGNOSIS — R07.9 CHEST PAIN, UNSPECIFIED TYPE: ICD-10-CM

## 2021-07-29 LAB
LV EF: 49 %
LVEF MODALITY: NORMAL

## 2021-07-29 PROCEDURE — 3430000000 HC RX DIAGNOSTIC RADIOPHARMACEUTICAL: Performed by: FAMILY MEDICINE

## 2021-07-29 PROCEDURE — 6360000002 HC RX W HCPCS: Performed by: INTERNAL MEDICINE

## 2021-07-29 PROCEDURE — A9500 TC99M SESTAMIBI: HCPCS | Performed by: FAMILY MEDICINE

## 2021-07-29 PROCEDURE — 93017 CV STRESS TEST TRACING ONLY: CPT

## 2021-07-29 PROCEDURE — 78452 HT MUSCLE IMAGE SPECT MULT: CPT

## 2021-07-29 RX ADMIN — KIT FOR THE PREPARATION OF TECHNETIUM TC99M SESTAMIBI 30 MILLICURIE: 1 INJECTION, POWDER, LYOPHILIZED, FOR SOLUTION PARENTERAL at 11:15

## 2021-07-29 RX ADMIN — REGADENOSON 0.4 MG: 0.08 INJECTION, SOLUTION INTRAVENOUS at 11:14

## 2021-07-29 RX ADMIN — KIT FOR THE PREPARATION OF TECHNETIUM TC99M SESTAMIBI 10 MILLICURIE: 1 INJECTION, POWDER, LYOPHILIZED, FOR SOLUTION PARENTERAL at 09:25

## 2021-08-03 ENCOUNTER — OFFICE VISIT (OUTPATIENT)
Dept: FAMILY MEDICINE CLINIC | Age: 51
End: 2021-08-03
Payer: COMMERCIAL

## 2021-08-03 VITALS
BODY MASS INDEX: 39.56 KG/M2 | HEART RATE: 63 BPM | DIASTOLIC BLOOD PRESSURE: 78 MMHG | TEMPERATURE: 97.8 F | HEIGHT: 68 IN | SYSTOLIC BLOOD PRESSURE: 120 MMHG | WEIGHT: 261 LBS

## 2021-08-03 DIAGNOSIS — E78.5 HYPERLIPIDEMIA, UNSPECIFIED HYPERLIPIDEMIA TYPE: ICD-10-CM

## 2021-08-03 DIAGNOSIS — F51.04 PSYCHOPHYSIOLOGICAL INSOMNIA: Primary | ICD-10-CM

## 2021-08-03 DIAGNOSIS — R45.89 MOODINESS: ICD-10-CM

## 2021-08-03 DIAGNOSIS — I10 ESSENTIAL HYPERTENSION: ICD-10-CM

## 2021-08-03 DIAGNOSIS — G43.909 MIGRAINE WITHOUT STATUS MIGRAINOSUS, NOT INTRACTABLE, UNSPECIFIED MIGRAINE TYPE: ICD-10-CM

## 2021-08-03 DIAGNOSIS — J45.20 MILD INTERMITTENT ASTHMA WITHOUT COMPLICATION: ICD-10-CM

## 2021-08-03 DIAGNOSIS — R51.9 HEADACHE DISORDER: ICD-10-CM

## 2021-08-03 PROCEDURE — 99213 OFFICE O/P EST LOW 20 MIN: CPT | Performed by: FAMILY MEDICINE

## 2021-08-03 RX ORDER — AMITRIPTYLINE HYDROCHLORIDE 25 MG/1
25 TABLET, FILM COATED ORAL NIGHTLY
Qty: 90 TABLET | Refills: 1 | Status: SHIPPED | OUTPATIENT
Start: 2021-08-03 | End: 2021-10-20 | Stop reason: SDUPTHER

## 2021-08-03 NOTE — PATIENT INSTRUCTIONS
Need help scheduling your covid vaccine? [address] [phone]       PLEASE BRING YOUR MEDICATIONS TO ALL APPOINTMENTS    The diagnoses and medications listed in this after visit summary may not be accurate at the time of check out. Please check MY CHART in 28-48 hours for possible corrections. Late cancellation policy: So that we can better accommodate people who are sick, please give our office 24 hour notice for an appointment cancellation. Thank you. Missed appointments: Your care is very important to us. It is important that you keep your scheduled appointments. Multiple missed appointments will lead to a dismissal from the office. Later arrival policy: If you are more than 10 minutes late for your appointment, you will be asked to re-schedule. Please allow 5-7 business days for paperwork to be processed. It is important that you check your MY Chart messages, as they include appointment reminders, test results, and other important information. If you have forgotten your password, please call [phone]. HERE ARE SOME LIFE CHANGING TIPS      1. Take your blood pressure medications at bedtime. This reduces your chance of cardiovascular event by half  2. Fever in kids:  Give both Tylenol and Ibuprofen at the same time rather than staggering them which is confusing  3. Follow these tips to reduce childhood obesity: Reduce unnecessary exposure to antibiotics, consume whole milk instead of skim milk, watch public TV instead of regular TV (less exposure to junk food commercials), and reduce traumatic experiences. 4. 1 egg per day is good for your heart  5. Alternate day fasting does promote weight loss. Skipping breakfast increases your risk of obesity  6. Artificially sweetened drinks increase all cause mortality (strokes, BMI, cardiovascular)  7. Kale consumption can reduce onset of dementia  8.  Walking at least 8000 steps per day and resistance exercise 2-3 x per week are good for your heart  9.  Brushing teeth 3 times per day can decrease chance of getting diabetes

## 2021-08-03 NOTE — PROGRESS NOTES
Patient ID: Bowen Starr 1970    . Chief Complaint   Patient presents with    Chest Pain    Headache         HPI   Chest pain: Resolved    Headache: Much better with amitriptyline. Insomnia: Amitriptyline works well. She initially took to 50 mg dose which caused her to be extremely sleepy the next day. We subsequently had her cut the dose in half and it works very well. She is very happy with the results. Prescription request: Is asking that her medication be sent to [facility] for 90-day supply. Review of Systems    Patient Active Problem List   Diagnosis    History of breast cancer    Hyperlipidemia    Headache    Asthma    Malignant neoplasm of right female breast ([facility] [address]    Impaired glucose tolerance    Essential hypertension    Left foot pain    Obesity, Class III, BMI 40-49.9 (morbid obesity) ([facility])    Chronic pain of left lower extremity    Right-sided chest wall pain    Right lateral epicondylitis       Past Surgical History:   Procedure Laterality Date    BREAST BIOPSY      COLONOSCOPY      polyp. Repeat in     COLONOSCOPY  2016    diverticulosis    ELBOW FRACTURE SURGERY Right 12/10/2019    RIGHT LATERAL EPICONDYLITIS TENDON DEBRIDEMENT, BOSWORTH PROCEDURE performed by Jae Van DO [facility] 73 HYSTERECTOMY      Cysts, Endometriosis.   1 ovary remains    TONSILLECTOMY AND ADENOIDECTOMY      TUBAL LIGATION         Family History   Problem Relation Age of Onset    Hypertension Father     Stroke Maternal Aunt     Diabetes Paternal Aunt     Diabetes Paternal Uncle     Stroke Maternal Grandmother     Depression Son     Asthma Son     Stroke Mother 79         in 2017   Anjali Newman Sister                Current Outpatient Medications on File Prior to Visit   Medication Sig Dispense Refill    albuterol sulfate (PROAIR RESPICLICK) 127 (90 Base) MCG/ACT aerosol powder inhalation Inhale 2 puffs into the lungs every 6 hours as needed for Shortness of Breath 1 Inhaler 0    ibuprofen (ADVIL;MOTRIN) 200 MG tablet Take 800 mg by mouth every 6 hours as needed for Pain Indications: taking PRN  (Patient not taking: Reported on 8/3/2021)      Elastic Bandages & Supports (TENNIS ELBOW NEOPRENE BRACE) MISC Wear daily 1 each 0     Current Facility-Administered Medications on File Prior to Visit   Medication Dose Route Frequency Provider Last Rate Last Admin    promethazine (PHENERGAN) injection 25 mg  25 mg Intramuscular Q6H PRN Dg Ng MD   25 mg at 03/25/14 1541                   Objective:         Physical Exam  Vitals and nursing note reviewed. Constitutional:       General: She is not in acute distress. Appearance: She is well-developed. She is obese. HENT:      Head: Normocephalic and atraumatic. Cardiovascular:      Rate and Rhythm: Normal rate and regular rhythm. Heart sounds: Normal heart sounds, S1 normal and S2 normal.   Pulmonary:      Effort: Pulmonary effort is normal. No respiratory distress. Breath sounds: Normal breath sounds. No wheezing. Musculoskeletal:      Cervical back: Neck supple. Skin:     General: Skin is warm and dry. Neurological:      Mental Status: She is alert and oriented to person, place, and time. Comments: . Psychiatric:         Attention and Perception: She is attentive. Speech: Speech normal.         Behavior: Behavior normal.         Thought Content: Thought content normal.         Judgment: Judgment normal.       Vitals:    08/03/21 1608   BP: 120/78   Site: Left Upper Arm   Position: Sitting   Cuff Size: Medium Adult   Pulse: 63   Temp: 97.8 °F (36.6 °C)   TempSrc: Infrared   Weight: 261 lb (118.4 kg)   Height: 5' 8\" (1.727 m)     Body mass index is 39.68 kg/m².      Wt Readings from Last 3 Encounters:   08/03/21 261 lb (118.4 kg)   07/20/21 260 lb (117.9 kg)   07/01/21 264 lb 12.8 oz (120.1 kg)     BP Readings from Last 3 Encounters:   08/03/21 120/78   07/20/21 118/78   07/01/21 (!) 124/90          No results found for this visit on 08/03/21. The 10-year ASCVD risk score (Ursula Cervantes, et al., 2013) is: 2%    Values used to calculate the score:      Age: 45 years      Sex: Female      Is Non- : Yes      Diabetic: No      Tobacco smoker: No      Systolic Blood Pressure: 077 mmHg      Is BP treated: No      HDL Cholesterol: 55 mg/dL      Total Cholesterol: 238 mg/dL  Lab Review   Office Visit on 07/20/2021   Component Date Value    Sodium 07/20/2021 142     Potassium 07/20/2021 4.0     Chloride 07/20/2021 104     CO2 07/20/2021 24     Anion Gap 07/20/2021 14     Glucose 07/20/2021 89     BUN 07/20/2021 8     CREATININE 07/20/2021 0.6     GFR Non- 07/20/2021 >60     GFR  07/20/2021 >60     Calcium 07/20/2021 9.7     Total Protein 07/20/2021 7.4     Albumin 07/20/2021 4.5     Albumin/Globulin Ratio 07/20/2021 1.6     Total Bilirubin 07/20/2021 0.8     Alkaline Phosphatase 07/20/2021 72     ALT 07/20/2021 23     AST 07/20/2021 25     Globulin 07/20/2021 2.9     Cholesterol, Total 07/20/2021 238*    Triglycerides 07/20/2021 176*    HDL 07/20/2021 55     LDL Calculated 07/20/2021 148*    VLDL Cholesterol Calcula* 07/20/2021 35     Hemoglobin A1C 07/20/2021 5.6     eAG 07/20/2021 114.0    Hospital Outpatient Visit on 07/01/2021   Component Date Value    Trichomonas Vaginalis DNA 07/01/2021 POSITIVE*    Garndinella Vaginalis DNA 07/01/2021 Negative     HORACE SPECIES, DNA PRO* 07/01/2021 Negative    Office Visit on 07/01/2021   Component Date Value    C. trachomatis DNA ,Urine 07/01/2021 Negative     N. gonorrhoeae DNA, Urine 07/01/2021 Negative     Urine Culture, Routine 07/01/2021 No growth at 18 to 36 hours        Stress test: Negative    Assessment:       Diagnosis Orders   1. Psychophysiological insomnia  amitriptyline (ELAVIL) 25 MG tablet   2.  Migraine without status

## 2021-08-04 RX ORDER — METOPROLOL SUCCINATE 100 MG/1
100 TABLET, EXTENDED RELEASE ORAL DAILY
Qty: 90 TABLET | Refills: 1 | Status: SHIPPED | OUTPATIENT
Start: 2021-08-04 | End: 2021-10-20 | Stop reason: SDUPTHER

## 2021-08-04 RX ORDER — SUMATRIPTAN 100 MG/1
100 TABLET, FILM COATED ORAL 2 TIMES DAILY PRN
Qty: 27 TABLET | Refills: 1 | Status: SHIPPED | OUTPATIENT
Start: 2021-08-04 | End: 2021-10-20 | Stop reason: SDUPTHER

## 2021-08-04 RX ORDER — ALBUTEROL SULFATE 90 UG/1
2 AEROSOL, METERED RESPIRATORY (INHALATION) EVERY 4 HOURS PRN
Qty: 3 INHALER | Refills: 0 | Status: SHIPPED | OUTPATIENT
Start: 2021-08-04 | End: 2021-08-26

## 2021-08-04 RX ORDER — ATORVASTATIN CALCIUM 80 MG/1
80 TABLET, FILM COATED ORAL DAILY
Qty: 90 TABLET | Refills: 1 | Status: SHIPPED | OUTPATIENT
Start: 2021-08-04 | End: 2021-10-20 | Stop reason: SDUPTHER

## 2021-08-17 ENCOUNTER — TELEPHONE (OUTPATIENT)
Dept: FAMILY MEDICINE CLINIC | Age: 51
End: 2021-08-17

## 2021-08-17 NOTE — TELEPHONE ENCOUNTER
LM on Vm for patient to schedule physical. Received physical forms to be filled out.  (Forms in blue folder)

## 2021-08-26 ENCOUNTER — OFFICE VISIT (OUTPATIENT)
Dept: FAMILY MEDICINE CLINIC | Age: 51
End: 2021-08-26
Payer: COMMERCIAL

## 2021-08-26 VITALS
TEMPERATURE: 97.1 F | HEART RATE: 65 BPM | OXYGEN SATURATION: 98 % | SYSTOLIC BLOOD PRESSURE: 122 MMHG | WEIGHT: 262.2 LBS | BODY MASS INDEX: 39.74 KG/M2 | HEIGHT: 68 IN | DIASTOLIC BLOOD PRESSURE: 80 MMHG

## 2021-08-26 DIAGNOSIS — Z23 NEED FOR SHINGLES VACCINE: ICD-10-CM

## 2021-08-26 DIAGNOSIS — Z12.11 SCREEN FOR COLON CANCER: ICD-10-CM

## 2021-08-26 DIAGNOSIS — Z00.00 ANNUAL PHYSICAL EXAM: Primary | ICD-10-CM

## 2021-08-26 DIAGNOSIS — G43.809 OTHER MIGRAINE WITHOUT STATUS MIGRAINOSUS, NOT INTRACTABLE: ICD-10-CM

## 2021-08-26 PROCEDURE — 99396 PREV VISIT EST AGE 40-64: CPT | Performed by: FAMILY MEDICINE

## 2021-08-26 RX ORDER — SUMATRIPTAN 6 MG/.5ML
6 INJECTION, SOLUTION SUBCUTANEOUS DAILY PRN
Qty: 0.5 ML | Refills: 5 | Status: SHIPPED | OUTPATIENT
Start: 2021-08-26 | End: 2021-09-01 | Stop reason: SDUPTHER

## 2021-08-26 NOTE — PROGRESS NOTES
imiHistory and Physical      [place]  YOB: 1970    Date of Service:  8/26/2021    Chief Complaint:   Serjio Flanagan is a 45 y.o. female who presents for complete physical examination. HPI: here for physical so that form can be filled out for work    Migraine headache: No migraine headache now but had bad migraine over the weekend. For 3 days. Almost went to the emergency room for shots.  }  Sexual activity: none     Seatbelt use:Yes  Sees dentist every 6 months: yes  Intimate partner violence?  No      Wt Readings from Last 3 Encounters:   08/26/21 262 lb 3.2 oz (118.9 kg)   08/03/21 261 lb (118.4 kg)   07/20/21 260 lb (117.9 kg)     BP Readings from Last 3 Encounters:   08/26/21 122/80   08/03/21 120/78   07/20/21 118/78       Patient Active Problem List   Diagnosis    History of breast cancer    Hyperlipidemia    Headache    Asthma    Malignant neoplasm of right female breast ([address]    Impaired glucose tolerance    Essential hypertension    Left foot pain    Obesity, Class III, BMI 40-49.9 (morbid obesity) (HCC)    Chronic pain of left lower extremity    Right-sided chest wall pain    Right lateral epicondylitis       Preventive Care:  Health Maintenance   Topic Date Due    Shingles Vaccine (1 of 2) Never done    Colon cancer screen colonoscopy  03/28/2021    Flu vaccine (1) 09/01/2021    Breast cancer screen  10/12/2021    Lipid screen  07/20/2022    Potassium monitoring  07/20/2022    Creatinine monitoring  07/20/2022    DTaP/Tdap/Td vaccine (3 - Td or Tdap) 03/10/2030    Pneumococcal 0-64 years Vaccine (2 of 2 - PPSV23) 08/13/2034    COVID-19 Vaccine  Completed    Hepatitis C screen  Completed    HIV screen  Completed    Hepatitis A vaccine  Aged Out    Hepatitis B vaccine  Aged Out    Hib vaccine  Aged Out    Meningococcal (ACWY) vaccine  Aged Out      Lipid panel:   Lab Results   Component Value Date    CHOL 238 (H) 07/20/2021    TRIG 176 (H) 07/20/2021 HDL 55 07/20/2021    LDLCALC 148 (H) 07/20/2021    LDLDIRECT 221 (H) 08/23/2017          Immunization History   Administered Date(s) Administered    COVID-19, Pfizer, PF, 30mcg/0.3mL 03/23/2021, 04/14/2021    Influenza 10/16/2012, 09/04/2013    Influenza Virus Vaccine 02/07/2011, 09/04/2013, 09/17/2014, 09/24/2015    Influenza, Kimmy Childes, IM, PF (6 mo and older Fluzone, Flulaval, Fluarix, and 3 yrs and older Afluria) 09/28/2016, 08/30/2017, 09/05/2018, 11/18/2019, 09/28/2020    Pneumococcal Polysaccharide ([id number]) 05/29/2012    Tdap (Boostrix, Adacel) 01/18/2010, 03/10/2020       Allergies   Allergen Reactions    Aloe Vera Rash     Outpatient Medications Marked as Taking for the 8/26/21 encounter (Office Visit) with Kenzie Rai MD   Medication Sig Dispense Refill    SUMAtriptan (IMITREX) 6 MG/0.5ML SOLN injection Inject 0.5 mLs into the skin daily as needed for Migraine 0.5 mL 5    SUMAtriptan (IMITREX) 100 MG tablet Take 1 tablet by mouth 2 times daily as needed for Migraine 27 tablet 1    atorvastatin (LIPITOR) 80 MG tablet Take 1 tablet by mouth daily 90 tablet 1    metoprolol succinate (TOPROL XL) 100 MG extended release tablet Take 1 tablet by mouth daily 90 tablet 1    beclomethasone (QVAR REDIHALER) 80 MCG/ACT AERB inhaler Inhale 2 puffs into the lungs 2 times daily 3 Inhaler 1    amitriptyline (ELAVIL) 25 MG tablet Take 1 tablet by mouth nightly 90 tablet 1    albuterol sulfate (PROAIR RESPICLICK) 785 (90 Base) MCG/ACT aerosol powder inhalation Inhale 2 puffs into the lungs every 6 hours as needed for Shortness of Breath 1 Inhaler 0       Past Medical History:   Diagnosis Date    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 Repeat in     COLONOSCOPY  2016    diverticulosis    ELBOW FRACTURE SURGERY Right 12/10/2019    RIGHT LATERAL EPICONDYLITIS TENDON DEBRIDEMENT, BOSWORTH PROCEDURE performed by Shamika Nguyen DO at [facility] HYSTERECTOMY      Cysts, Endometriosis. 1 ovary remains    TONSILLECTOMY AND ADENOIDECTOMY      TUBAL LIGATION       Family History   Problem Relation Age of Onset    Hypertension Father     Stroke Maternal Aunt     Diabetes Paternal Aunt     Diabetes Paternal Uncle     Stroke Maternal Grandmother     Depression Son     Asthma Son     Stroke Mother 79         in 2017   [place] Sister                  Review of Systems:  A comprehensive review of systems was negative except for what was noted in the HPI. Physical Exam:   Vitals:    21 1018   BP: 122/80   Pulse: 65   Temp: 97.1 °F (36.2 °C)   TempSrc: Infrared   SpO2: 98%   Weight: 262 lb 3.2 oz (118.9 kg)   Height: 5' 7.5\" (1.715 m)     Body mass index is 40.46 kg/m². Constitutional: She is oriented to person, place, and time. She appears well-developed and well-nourished. No distress. Morbidly obese  HEENT:   Head: Normocephalic and atraumatic. Right Ear: Tympanic membrane, external ear and ear canal normal.   Left Ear: Tympanic membrane, external ear and ear canal normal.   Nose: Nose normal.   Mouth/Throat: Oropharynx is clear and moist, and mucous membranes are normal.  There is no cervical adenopathy. Eyes: Conjunctivae and extraocular motions are normal. Pupils are equal, round, and reactive to light. Neck: Neck supple. No JVD present. Carotid bruit is not present. No mass and no thyromegaly present.    Cardiovascular: Normal rate, regular rhythm, normal heart sounds and intact distal pulses. Exam reveals no gallop and no friction rub. No murmur heard. Pulmonary/Chest: Effort normal and breath sounds normal. No respiratory distress. She has no wheezes, rhonchi or rales. Abdominal: Soft, non-tender. Bowel sounds and aorta are normal. She exhibits no organomegaly, mass or bruit. Genitourinary: examination not indicated. Breast exam:  breasts appear normal, no suspicious masses, no skin or nipple changes or axillary nodes. Musculoskeletal: Normal range of motion, no synovitis. She exhibits no edema. Neurological: She is alert and oriented to person, place, and time. She has normal reflexes. No cranial nerve deficit. Coordination normal.   Skin: Skin is warm and dry. There is no rash or erythema. No suspicious lesions noted. Psychiatric: She has a normal mood and affect.  Her speech is normal and behavior is normal. Judgment, cognition and memory are normal.       Wt Readings from Last 3 Encounters:   08/26/21 262 lb 3.2 oz (118.9 kg)   08/03/21 261 lb (118.4 kg)   07/20/21 260 lb (117.9 kg)     BP Readings from Last 3 Encounters:   08/26/21 122/80   08/03/21 120/78   07/20/21 118/78          The 10-year ASCVD risk score (Samara Rutledge et al., 2013) is: 2.3%    Values used to calculate the score:      Age: 45 years      Sex: Female      Is Non- : Yes      Diabetic: No      Tobacco smoker: No      Systolic Blood Pressure: 388 mmHg      Is BP treated: No      HDL Cholesterol: 55 mg/dL      Total Cholesterol: 238 mg/dL        Lab Review:   Hospital Outpatient Visit on 07/29/2021   Component Date Value    Left Ventricular Ejectio* 07/29/2021 49     LVEF MODALITY 07/29/2021 Nuclear    Office Visit on 07/20/2021   Component Date Value    Sodium 07/20/2021 142     Potassium 07/20/2021 4.0     Chloride 07/20/2021 104     CO2 07/20/2021 24     Anion Gap 07/20/2021 14     Glucose 07/20/2021 89     BUN 07/20/2021 8     CREATININE 07/20/2021 0.6     GFR Non- 07/20/2021 >60     GFR  07/20/2021 >60     Calcium 07/20/2021 9.7     Total Protein 07/20/2021 7.4     Albumin 07/20/2021 4.5     Albumin/Globulin Ratio 07/20/2021 1.6     Total Bilirubin 07/20/2021 0.8     Alkaline Phosphatase 07/20/2021 72     ALT 07/20/2021 23     AST 07/20/2021 25     Globulin 07/20/2021 2.9     Cholesterol, Total 07/20/2021 238*    Triglycerides 07/20/2021 176*    HDL 07/20/2021 55     LDL Calculated 07/20/2021 148*    VLDL Cholesterol Calcula* 07/20/2021 35     Hemoglobin A1C 07/20/2021 5.6     eAG 07/20/2021 114.0    Hospital Outpatient Visit on 07/01/2021   Component Date Value    Trichomonas Vaginalis DNA 07/01/2021 POSITIVE*    Garndinella Vaginalis DNA 07/01/2021 Negative     HORACE SPECIES, DNA PRO* 07/01/2021 Negative    Office Visit on 07/01/2021   Component Date Value    C. trachomatis DNA ,Urine 07/01/2021 Negative     N. gonorrhoeae DNA, Urine 07/01/2021 Negative     Urine Culture, Routine 07/01/2021 No growth at 18 to 36 hours         Assessment/Plan:    Malik Castro was seen today for annual exam.    Diagnoses and all orders for this visit:    Annual physical exam    Need for shingles vaccine    Screen for colon cancer    Other migraine without status migrainosus, not intractable  -     SUMAtriptan (IMITREX) 6 MG/0.5ML SOLN injection; Inject 0.5 mLs into the skin daily as needed for Migraine      Weight loss strongly recommended. Goal of 5 vegetables and fruits per day or half the plate be vegetable and fruits  2-3 serving of dairy per day.      30 minutes of walking per day or 2 1/2 hours per week of walking or 8,000 steps     Reminded her to bring back her fit kit    Per USPTF guidelines, patient is not needing mammogram at this time

## 2021-08-26 NOTE — PATIENT INSTRUCTIONS
Need help scheduling your covid vaccine? [address] [phone]       PLEASE BRING YOUR MEDICATIONS TO ALL APPOINTMENTS    The diagnoses and medications listed in this after visit summary may not be accurate at the time of check out. Please check MY CHART in 28-48 hours for possible corrections. Late cancellation policy: So that we can better accommodate people who are sick, please give our office 24 hour notice for an appointment cancellation. Thank you. Missed appointments: Your care is very important to us. It is important that you keep your scheduled appointments. Multiple missed appointments will lead to a dismissal from the office. Later arrival policy: If you are more than 10 minutes late for your appointment, you will be asked to re-schedule. Please allow 5-7 business days for paperwork to be processed. It is important that you check your MY Chart messages, as they include appointment reminders, test results, and other important information. If you have forgotten your password, please call [phone]. HERE ARE SOME LIFE CHANGING TIPS      1. Take your blood pressure medications at bedtime. This reduces your chance of cardiovascular event by half  2. Fever in kids:  Give both Tylenol and Ibuprofen at the same time rather than staggering them which is confusing  3. Follow these tips to reduce childhood obesity: Reduce unnecessary exposure to antibiotics, consume whole milk instead of skim milk, watch public TV instead of regular TV (less exposure to junk food commercials), and reduce traumatic experiences. 4. 1 egg per day is good for your heart  5. Alternate day fasting does promote weight loss. Skipping breakfast increases your risk of obesity  6. Artificially sweetened drinks increase all cause mortality (strokes, BMI, cardiovascular)  7. Kale consumption can reduce onset of dementia  8.  Walking at least 8000 steps per day and resistance exercise 2-3 x per week are good for your heart  9.  Brushing teeth 3 times per day can decrease chance of getting diabetes

## 2021-09-01 DIAGNOSIS — G43.809 OTHER MIGRAINE WITHOUT STATUS MIGRAINOSUS, NOT INTRACTABLE: ICD-10-CM

## 2021-09-01 RX ORDER — SUMATRIPTAN 6 MG/.5ML
6 INJECTION, SOLUTION SUBCUTANEOUS DAILY PRN
Qty: 0.5 ML | Refills: 5 | Status: SHIPPED | OUTPATIENT
Start: 2021-09-01 | End: 2021-09-02 | Stop reason: SDUPTHER

## 2021-09-01 NOTE — TELEPHONE ENCOUNTER
Pharmacy received a scrip for Imitrex vials. Patient has not been on the vials and does not have syringes. Pharmacy asking if you can send a script for Imitrex Auto injector instead. If so send need script for Imitrex Auto Injector. Or send script for syringes.

## 2021-09-02 ENCOUNTER — TELEPHONE (OUTPATIENT)
Dept: FAMILY MEDICINE CLINIC | Age: 51
End: 2021-09-02

## 2021-09-02 DIAGNOSIS — G43.809 OTHER MIGRAINE WITHOUT STATUS MIGRAINOSUS, NOT INTRACTABLE: ICD-10-CM

## 2021-09-02 RX ORDER — SUMATRIPTAN 6 MG/.5ML
6 INJECTION, SOLUTION SUBCUTANEOUS DAILY PRN
Qty: 0.5 ML | Refills: 5 | Status: SHIPPED | OUTPATIENT
Start: 2021-09-02 | End: 2022-07-12 | Stop reason: SDUPTHER

## 2021-09-02 NOTE — TELEPHONE ENCOUNTER
[address] called stating that the script you sent for the auto injector.  Please resend,  Pharmacy said [facility] [phone]

## 2021-09-02 NOTE — TELEPHONE ENCOUNTER
[address] [phone] does not work. I have free texted this into the script and have resent the script for a 2nd time. Please check if this can go through.   Otherwise, give verbal.

## 2021-09-10 ENCOUNTER — TELEPHONE (OUTPATIENT)
Dept: FAMILY MEDICINE CLINIC | Age: 51
End: 2021-09-10

## 2021-10-20 ENCOUNTER — OFFICE VISIT (OUTPATIENT)
Dept: FAMILY MEDICINE CLINIC | Age: 51
End: 2021-10-20
Payer: COMMERCIAL

## 2021-10-20 VITALS
HEART RATE: 76 BPM | SYSTOLIC BLOOD PRESSURE: 122 MMHG | WEIGHT: 265.8 LBS | BODY MASS INDEX: 41.02 KG/M2 | DIASTOLIC BLOOD PRESSURE: 80 MMHG | OXYGEN SATURATION: 99 % | TEMPERATURE: 97 F

## 2021-10-20 DIAGNOSIS — E66.01 OBESITY, CLASS III, BMI 40-49.9 (MORBID OBESITY) (HCC): ICD-10-CM

## 2021-10-20 DIAGNOSIS — R73.02 IMPAIRED GLUCOSE TOLERANCE: ICD-10-CM

## 2021-10-20 DIAGNOSIS — E78.5 HYPERLIPIDEMIA, UNSPECIFIED HYPERLIPIDEMIA TYPE: ICD-10-CM

## 2021-10-20 DIAGNOSIS — Z12.11 SCREEN FOR COLON CANCER: ICD-10-CM

## 2021-10-20 DIAGNOSIS — I10 ESSENTIAL HYPERTENSION: Primary | ICD-10-CM

## 2021-10-20 DIAGNOSIS — Z23 NEEDS FLU SHOT: ICD-10-CM

## 2021-10-20 DIAGNOSIS — G43.909 MIGRAINE WITHOUT STATUS MIGRAINOSUS, NOT INTRACTABLE, UNSPECIFIED MIGRAINE TYPE: ICD-10-CM

## 2021-10-20 DIAGNOSIS — R51.9 HEADACHE DISORDER: ICD-10-CM

## 2021-10-20 DIAGNOSIS — F51.04 PSYCHOPHYSIOLOGICAL INSOMNIA: ICD-10-CM

## 2021-10-20 DIAGNOSIS — R45.89 MOODINESS: ICD-10-CM

## 2021-10-20 DIAGNOSIS — Z85.3 HISTORY OF BREAST CANCER: ICD-10-CM

## 2021-10-20 DIAGNOSIS — J45.20 MILD INTERMITTENT ASTHMA WITHOUT COMPLICATION: ICD-10-CM

## 2021-10-20 DIAGNOSIS — Z12.31 ENCOUNTER FOR SCREENING MAMMOGRAM FOR MALIGNANT NEOPLASM OF BREAST: ICD-10-CM

## 2021-10-20 PROCEDURE — 99213 OFFICE O/P EST LOW 20 MIN: CPT | Performed by: FAMILY MEDICINE

## 2021-10-20 PROCEDURE — 90674 CCIIV4 VAC NO PRSV 0.5 ML IM: CPT | Performed by: FAMILY MEDICINE

## 2021-10-20 PROCEDURE — 90471 IMMUNIZATION ADMIN: CPT | Performed by: FAMILY MEDICINE

## 2021-10-20 RX ORDER — ATORVASTATIN CALCIUM 80 MG/1
80 TABLET, FILM COATED ORAL DAILY
Qty: 90 TABLET | Refills: 1 | Status: SHIPPED | OUTPATIENT
Start: 2021-10-20 | End: 2022-07-12 | Stop reason: SDUPTHER

## 2021-10-20 RX ORDER — METOPROLOL SUCCINATE 100 MG/1
100 TABLET, EXTENDED RELEASE ORAL DAILY
Qty: 90 TABLET | Refills: 1 | Status: SHIPPED | OUTPATIENT
Start: 2021-10-20 | End: 2022-07-12 | Stop reason: SDUPTHER

## 2021-10-20 RX ORDER — ALBUTEROL SULFATE 90 UG/1
2 AEROSOL, METERED RESPIRATORY (INHALATION) EVERY 4 HOURS PRN
Qty: 18 G | Refills: 0 | Status: SHIPPED | OUTPATIENT
Start: 2021-10-20 | End: 2022-07-12 | Stop reason: SDUPTHER

## 2021-10-20 RX ORDER — SUMATRIPTAN 100 MG/1
100 TABLET, FILM COATED ORAL 2 TIMES DAILY PRN
Qty: 27 TABLET | Refills: 1 | Status: SHIPPED | OUTPATIENT
Start: 2021-10-20 | End: 2022-07-12 | Stop reason: SDUPTHER

## 2021-10-20 RX ORDER — AMITRIPTYLINE HYDROCHLORIDE 25 MG/1
25 TABLET, FILM COATED ORAL NIGHTLY
Qty: 90 TABLET | Refills: 1 | Status: SHIPPED | OUTPATIENT
Start: 2021-10-20 | End: 2022-07-12 | Stop reason: SDUPTHER

## 2021-10-20 ASSESSMENT — ENCOUNTER SYMPTOMS: SHORTNESS OF BREATH: 0

## 2021-10-20 NOTE — PROGRESS NOTES
Patient ID: Pernell Goldberg 1970    . Chief Complaint   Patient presents with    Hypertension    Hyperlipidemia    Headache    Anxiety         Hypertension  This is a chronic problem. The current episode started more than 1 year ago. Associated symptoms include headaches. Pertinent negatives include no chest pain, palpitations or shortness of breath. Past treatments include beta blockers and calcium channel blockers. Hyperlipidemia  This is a chronic problem. The current episode started more than 1 year ago. The problem is uncontrolled. Recent lipid tests were reviewed and are high. Pertinent negatives include no chest pain or shortness of breath. Current antihyperlipidemic treatment includes statins. The current treatment provides mild improvement of lipids. Compliance problems include psychosocial issues. Risk factors for coronary artery disease include obesity, stress and a sedentary lifestyle. Headache   This is a recurrent problem. The current episode started more than 1 month ago. The problem occurs daily. The problem has been gradually worsening. The pain is located in the parietal, temporal, occipital and bilateral region. The pain is moderate. Exacerbated by: stress, lack of sleep. She has tried nothing for the symptoms. The treatment provided significant relief. Her past medical history is significant for migraine headaches. Mental Health Problem  The primary symptoms do not include dysphoric mood. Primary symptoms comment: stressed because just put her father in a nursing home and her  is dyin from colon cancer. is still wondering if it was a mistake that she tested positive for STD (from her husban). The current episode started more than 1 month ago. This is a new problem. The onset of the illness is precipitated by a stressful event and emotional stress. Additional symptoms of the illness include headaches. She does not admit to suicidal ideas.  She does not have a plan to attempt suicide. She has not already injured self. Asthma  There is no shortness of breath. This is a chronic problem. The current episode started more than 1 year ago. The problem occurs rarely. Associated symptoms include headaches. Pertinent negatives include no chest pain. Her symptoms are alleviated by beta-agonist. Her past medical history is significant for asthma. Review of Systems   Respiratory: Negative for shortness of breath. Cardiovascular: Negative for chest pain and palpitations. Neurological: Positive for headaches. Psychiatric/Behavioral: Negative for dysphoric mood. Patient Active Problem List   Diagnosis    History of breast cancer    Hyperlipidemia    Headache    Asthma    Malignant neoplasm of right female breast ([address]    Impaired glucose tolerance    Essential hypertension    Left foot pain    Obesity, Class III, BMI 40-49.9 (morbid obesity) ([facility]    Chronic pain of left lower extremity    Right-sided chest wall pain    Right lateral epicondylitis       Past Surgical History:   Procedure Laterality Date    BREAST BIOPSY      COLONOSCOPY      polyp. Repeat in     COLONOSCOPY  2016    diverticulosis    ELBOW FRACTURE SURGERY Right 12/10/2019    RIGHT LATERAL EPICONDYLITIS TENDON DEBRIDEMENT, BOSWORTH PROCEDURE performed by Jose Reyes DO at [facility] HYSTERECTOMY      Cysts, Endometriosis.   1 ovary remains    TONSILLECTOMY AND ADENOIDECTOMY      TUBAL LIGATION         Family History   Problem Relation Age of Onset    Hypertension Father     Stroke Maternal Aunt     Diabetes Paternal Aunt     Diabetes Paternal Uncle     Stroke Maternal Grandmother     Depression Son     Asthma Son     Stroke Mother 79         in 2017   [place] Sister                Current Outpatient Medications on File Prior to Visit   Medication Sig Dispense Refill    SUMAtriptan (IMITREX) 6 MG/0.5ML SOLN injection Inject 0.5 mLs into the skin daily as needed for Migraine Please dispense as [facility] [address] [phone] 0.5 mL 5     Current Facility-Administered Medications on File Prior to Visit   Medication Dose Route Frequency Provider Last Rate Last Admin    promethazine (PHENERGAN) injection 25 mg  25 mg IntraMUSCular Q6H PRN Manuel Stewart MD   25 mg at 03/25/14 1541                   Objective:         Physical Exam  Vitals and nursing note reviewed. Constitutional:       Appearance: She is well-developed. HENT:      Head: Normocephalic and atraumatic. Cardiovascular:      Rate and Rhythm: Normal rate and regular rhythm. Heart sounds: Normal heart sounds, S1 normal and S2 normal.   Pulmonary:      Effort: Pulmonary effort is normal. No respiratory distress. Breath sounds: Normal breath sounds. No wheezing. Musculoskeletal:      Cervical back: Neck supple. Skin:     General: Skin is warm and dry. Neurological:      Mental Status: She is alert. Vitals:    10/20/21 1553   BP: 122/80   Site: Left Upper Arm   Position: Sitting   Cuff Size: Large Adult   Pulse: 76   Temp: 97 °F (36.1 °C)   TempSrc: Infrared   SpO2: 99%   Weight: 265 lb 12.8 oz (120.6 kg)     Body mass index is 41.02 kg/m². Wt Readings from Last 3 Encounters:   10/20/21 265 lb 12.8 oz (120.6 kg)   08/26/21 262 lb 3.2 oz (118.9 kg)   08/03/21 261 lb (118.4 kg)     BP Readings from Last 3 Encounters:   10/20/21 122/80   08/26/21 122/80   08/03/21 120/78          No results found for this visit on 10/20/21. The 10-year ASCVD risk score (Azam Gallego, et al., 2013) is: 2.3%    Values used to calculate the score:      Age: 45 years      Sex: Female      Is Non- : Yes      Diabetic: No      Tobacco smoker: No      Systolic Blood Pressure: 810 mmHg      Is BP treated: No      HDL Cholesterol: 55 mg/dL      Total Cholesterol: 238 mg/dL  Lab Review   No visits with results within 2 Month(s) from this visit.    Latest known visit with results is:   Hospital Outpatient Visit on 07/29/2021   Component Date Value    Left Ventricular Ejectio* 07/29/2021 49     LVEF MODALITY 07/29/2021 Nuclear            Assessment:       Diagnosis Orders   1. Essential hypertension  metoprolol succinate (TOPROL XL) 100 MG extended release tablet   2. Needs flu shot  INFLUENZA, MDCK QUADV, 2 YRS AND OLDER, IM, PF, PREFILL SYR OR SDV, 0.5ML (FLUCELVAX QUADV, PF)   3. History of breast cancer  BECKY Screening Bilateral   4. Encounter for screening mammogram for malignant neoplasm of breast  BECKY Screening Bilateral   5. Impaired glucose tolerance     6. Obesity, Class III, BMI 40-49.9 (morbid obesity) ([facility] [address] [facility]     7. Moodiness  amitriptyline (ELAVIL) 25 MG tablet   8. Headache disorder  amitriptyline (ELAVIL) 25 MG tablet   9. Psychophysiological insomnia  amitriptyline (ELAVIL) 25 MG tablet   10. Mild intermittent asthma without complication  albuterol sulfate  (90 Base) MCG/ACT inhaler    beclomethasone (QVAR REDIHALER) 80 MCG/ACT AERB inhaler   11. Hyperlipidemia, unspecified hyperlipidemia type  atorvastatin (LIPITOR) 80 MG tablet   12. Migraine without status migrainosus, not intractable, unspecified migraine type  SUMAtriptan (IMITREX) 100 MG tablet   13. [facility]           Plan:      HTN stable. Continue metoprolol    Asthma and headaches stable. Continue current medication    Re-check 6 months      Return in about 25 weeks (around 4/13/2022) for Headache, HTN, Hyperlipid, Depression.

## 2021-10-20 NOTE — PATIENT INSTRUCTIONS
Learning About Eating More Fruits and Vegetables  What are some quick tips for eating more fruits and vegetables? We're all encouraged to eat more fruits and vegetables. Yet it can seem like one more chore on the daily to-do list. But you can add color and crunch to your meals--and lots of nutrition--with these quick tips. · Brighten up your breakfast.  ? Add sliced fruit or frozen berries to your yogurt, pancakes, or cereal.  ? Blend fresh or frozen fruit, veggies, and yogurt with a little fruit juice, and you've got a tasty smoothie. ? Make your scrambled eggs a gourmet treat by adding onions, celery, and bell peppers. ? Bake up some bran muffins with grated carrots added into the mix. · Make a livelier lunch. ? Jazz up tuna or chicken salad with apple chunks, celery, or grapes--or all of them! ? Add cucumbers, avocado slices, tomatoes, and lettuce to your sandwiches. ? Kick up the flavor of grilled cheese sandwiches with spinach and tomatoes. ? Puree some potatoes or squash to add to tomato soup. · Add delicious veggies to dinner. ? Give more color and taste to salads. Stir in red cabbage, carrots, and bell peppers. Top salads with dried cranberries or raisins. \"Frost\" your salad with orange sections or strawberries. ? Keep a bag or two of frozen vegetables ready to pull out of the freezer for a side dish. ? Spice up spaghetti and meatballs with mushrooms and bell peppers. ? Roast vegetables like cauliflower or squash in the oven with olive oil to bring out their flavor. ? Season your veggie dish with herbs like basil and fatemeh and a splash of lemon juice and olive oil. ? If you've got a main dish in the oven, stick in a potato to round out your meal.  · Grab some healthy snacks on the go. ? Scoop up an apple, banana, or plum for a quick snack. ? Cut up raw fruits and veggies to keep in your fridge. Grapes, oranges, carrots, and celery are great choices.  They'll be ready for a quick snack or an after-school treat. ? Dip raw vegetables in hummus or peanut butter. ? Keep dried fruit on hand for an easy \"take with you\" snack. · Make something sweet--and healthy. ? Try baked apples or pears topped with cinnamon and honey for a delicious dessert. ? Make chocolate chip cookies even better with grated carrots added to the mix. Where can you learn more? Go to https://ActionTax.capeRhomania.Moonshado. org and sign in to your [employer] account. Enter F050 in the [employer] box to learn more about \"Learning About Eating More Fruits and Vegetables. \"     If you do not have an account, please click on the \"Sign Up Now\" link. Current as of: November 7, 2018  Content Version: 12.0  © 2655-2331 Healthwise, Incorporated. Care instructions adapted under license by [facility] ([facility]). If you have questions about a medical condition or this instruction, always ask your healthcare professional. [address] any warranty or liability for your use of this information. PLEASE BRING YOUR MEDICATIONS TO ALL APPOINTMENTS    The diagnoses and medications listed in this after visit summary may not be accurate at the time of check out. Please check MY CHART in 28-48 hours for possible corrections. Late cancellation policy: So that we can better accommodate people who are sick, please give our office 24 hour notice for an appointment cancellation. Thank you. Missed appointments: Your care is very important to us. It is important that you keep your scheduled appointments. Multiple missed appointments will lead to a dismissal from the office. Later arrival policy: If you are more than 10 minutes late for your appointment, you will be asked to re-schedule. Please allow 5-7 business days for paperwork to be processed. It is important that you check your MY Chart messages, as they include appointment reminders, test results, and other important information.   If you have forgotten your password, please call [phone]. HERE ARE SOME LIFE CHANGING TIPS      1. Take your blood pressure medications at bedtime to reduce your chance of heart attack or stroke  2. Fever in kids:  Give both Tylenol and Ibuprofen at the same time rather than staggering them   3. Follow these tips to reduce childhood obesity: Reduce unnecessary exposure to antibiotics, consume whole milk instead of skim milk, watch public TV instead of regular TV (less exposure to junk food commercials), and reduce traumatic experiences. 4. 1 egg per day is good for your heart  5. Alternate day fasting does promote weight loss. Skipping breakfast increases your risk of obesity  6. Artificially sweetened drinks increase all cause mortality (strokes, body mass index, cardiovascular disease)  7. Kale consumption can reduce onset of dementia  8. Walking at least 8000 steps per day and resistance exercise 2-3 x per week are good for your heart  9. Brushing teeth 3 times per day can decrease chance of getting diabetes  10. Antibiotic use is associated with a lifetime increased risk of breast cancer and heart disease.

## 2021-10-27 ENCOUNTER — TELEPHONE (OUTPATIENT)
Dept: GASTROENTEROLOGY | Age: 51
End: 2021-10-27

## 2021-11-03 ENCOUNTER — TELEPHONE (OUTPATIENT)
Dept: GASTROENTEROLOGY | Age: 51
End: 2021-11-03

## 2021-11-10 ENCOUNTER — OFFICE VISIT (OUTPATIENT)
Dept: FAMILY MEDICINE CLINIC | Age: 51
End: 2021-11-10
Payer: COMMERCIAL

## 2021-11-10 ENCOUNTER — TELEPHONE (OUTPATIENT)
Dept: GASTROENTEROLOGY | Age: 51
End: 2021-11-10

## 2021-11-10 ENCOUNTER — HOSPITAL ENCOUNTER (OUTPATIENT)
Age: 51
Setting detail: SPECIMEN
Discharge: HOME | End: 2021-11-10
Payer: COMMERCIAL

## 2021-11-10 VITALS
WEIGHT: 262.6 LBS | HEIGHT: 68 IN | HEART RATE: 76 BPM | SYSTOLIC BLOOD PRESSURE: 118 MMHG | BODY MASS INDEX: 39.8 KG/M2 | DIASTOLIC BLOOD PRESSURE: 82 MMHG | TEMPERATURE: 97.8 F

## 2021-11-10 DIAGNOSIS — R05.9 COUGH: Primary | ICD-10-CM

## 2021-11-10 PROCEDURE — 99213 OFFICE O/P EST LOW 20 MIN: CPT | Performed by: FAMILY MEDICINE

## 2021-11-10 PROCEDURE — U0005 INFEC AGEN DETEC AMPLI PROBE: HCPCS

## 2021-11-10 PROCEDURE — U0003 INFECTIOUS AGENT DETECTION BY NUCLEIC ACID (DNA OR RNA); SEVERE ACUTE RESPIRATORY SYNDROME CORONAVIRUS 2 (SARS-COV-2) (CORONAVIRUS DISEASE [COVID-19]), AMPLIFIED PROBE TECHNIQUE, MAKING USE OF HIGH THROUGHPUT TECHNOLOGIES AS DESCRIBED BY CMS-2020-01-R: HCPCS

## 2021-11-10 NOTE — PROGRESS NOTES
OFFICE VISIT      Patient ID: Roro Romero 1970  Chief Complaint   Patient presents with    URI     itchy throat, dry throat, dry cough  puffy swollen eye in morning       HPI . HPI is per chief complaint. Cough is dry. Her symptoms have been ongoing for about a week. She thought maybe she was having allergy symptoms. Her chest was tight. She took her albuterol rescue inhaler and it did help. She has been vaccinated for Covid twice and has had Covid infection. She has not been around anyone with Covid as far as she knows. Stress: She is putting her  into hospice for metastatic colon cancer. Review of Systems    Patient Active Problem List   Diagnosis    History of breast cancer    Hyperlipidemia    Headache    Asthma    Malignant neoplasm of right female breast ([address]    Impaired glucose tolerance    Essential hypertension    Left foot pain    Obesity, Class III, BMI 40-49.9 (morbid obesity) (HCC)    Chronic pain of left lower extremity    Right-sided chest wall pain    Right lateral epicondylitis       Past Medical History:   Diagnosis Date    Arthritis     in right elbow    Asthma     last exacerbation 2014    Colon polyp 2010    Depression     Diverticulosis     colonoscopy 2016    Endometriosis     History of breast cancer 2009    lumpectomy r side chemo and radiation     HTN (hypertension) 9/18/2013    Hyperlipidemia 10/2012    Migraines 11/15/2019    LAST MIGRAINE 11/15/19    Ovarian cyst     Pre-diabetes     no meds    Seasonal allergies     Uterine fibroid        Past Surgical History:   Procedure Laterality Date    BREAST BIOPSY  2009    COLONOSCOPY  2010    polyp. Repeat in 2015    COLONOSCOPY  12/12/2016    diverticulosis    ELBOW FRACTURE SURGERY Right 12/10/2019    RIGHT LATERAL EPICONDYLITIS TENDON DEBRIDEMENT, BOSWORTH PROCEDURE performed by Saad Feliciano DO at [facility] 73 HYSTERECTOMY      Cysts, Endometriosis.   1 ovary remains    TONSILLECTOMY AND ADENOIDECTOMY      TUBAL LIGATION         Family History   Problem Relation Age of Onset    Hypertension Father     Stroke Maternal Aunt     Diabetes Paternal Aunt     Diabetes Paternal Uncle     Stroke Maternal Grandmother     Depression Son     Asthma Son     Stroke Mother 79         in 2017   Pietro Wells Sister                Current Outpatient Medications on File Prior to Visit   Medication Sig Dispense Refill    beclomethasone (QVAR REDIHALER) 80 MCG/ACT AERB inhaler Inhale 2 puffs into the lungs 2 times daily 1 each 5    amitriptyline (ELAVIL) 25 MG tablet Take 1 tablet by mouth nightly 90 tablet 1    metoprolol succinate (TOPROL XL) 100 MG extended release tablet Take 1 tablet by mouth daily 90 tablet 1    atorvastatin (LIPITOR) 80 MG tablet Take 1 tablet by mouth daily 90 tablet 1    SUMAtriptan (IMITREX) 100 MG tablet Take 1 tablet by mouth 2 times daily as needed for Migraine 27 tablet 1    albuterol sulfate  (90 Base) MCG/ACT inhaler Inhale 2 puffs into the lungs every 4 hours as needed for Shortness of Breath 18 g 0    SUMAtriptan (IMITREX) 6 MG/0.5ML SOLN injection Inject 0.5 mLs into the skin daily as needed for Migraine Please dispense as Timmothy [place] 2003 [facility] [id number] 0.5 mL 5     Current Facility-Administered Medications on File Prior to Visit   Medication Dose Route Frequency Provider Last Rate Last Admin    promethazine (PHENERGAN) injection 25 mg  25 mg IntraMUSCular Q6H PRN Tin Barrett MD   25 mg at 14 1541                   Objective:         Physical Exam  Vitals and nursing note reviewed. Constitutional:       General: She is not in acute distress. Appearance: She is well-developed. HENT:      Head: Normocephalic and atraumatic.       Right Ear: Hearing, tympanic membrane and external ear normal.      Left Ear: Hearing, tympanic membrane and external ear normal.      Nose: Nose normal. No nasal deformity, laceration, mucosal edema or rhinorrhea. Right Sinus: No maxillary sinus tenderness or frontal sinus tenderness. Left Sinus: No maxillary sinus tenderness or frontal sinus tenderness. Mouth/Throat:      Pharynx: No oropharyngeal exudate or posterior oropharyngeal erythema. Eyes:      Conjunctiva/sclera: Conjunctivae normal.   Neck:      Thyroid: No thyromegaly. Trachea: No tracheal deviation. Cardiovascular:      Rate and Rhythm: Normal rate and regular rhythm. Heart sounds: Normal heart sounds, S1 normal and S2 normal. No friction rub. No gallop. Pulmonary:      Effort: No respiratory distress. Breath sounds: No wheezing or rales. Lymphadenopathy:      Head:      Right side of head: No submental, submandibular or posterior auricular adenopathy. Left side of head: No submental, submandibular or posterior auricular adenopathy. Cervical:      Right cervical: No superficial, deep or posterior cervical adenopathy. Left cervical: No superficial, deep or posterior cervical adenopathy. Skin:     General: Skin is warm and dry. Psychiatric:         Behavior: Behavior normal.         Body mass index is 40.52 kg/m². Wt Readings from Last 3 Encounters:   11/10/21 262 lb 9.6 oz (119.1 kg)   10/20/21 265 lb 12.8 oz (120.6 kg)   08/26/21 262 lb 3.2 oz (118.9 kg)     BP Readings from Last 3 Encounters:   11/10/21 118/82   10/20/21 122/80   08/26/21 122/80          No results found for this visit on 11/10/21. Assessment:       Diagnosis Orders   1. Cough  COVID-19           Plan:      F/u by phone or MY Chart if symptoms worsen    Patient likely has a viral infection. Will hold off on antibiotics for now. However have asked her to call if she worsens by Friday morning. Of note, somehow patient made it through the screening processes that are put in place. Had I personally screened her, I would have had her do a virtual appointment. Increase fluids. Get plenty of rest.  Tylenol or Ibuprofen for fever or muscle aches. Wash hands frequently since you are contagious.

## 2021-11-10 NOTE — TELEPHONE ENCOUNTER
Called pt. In regards to a referral for a colon screening. Completed H&P and informed pt they would be called in 3-4 weeks. Pt. Stated understanding and denied further questions.  H&P put in purple open access folder

## 2021-11-10 NOTE — PROGRESS NOTES
Patient ID: Blanca Huynh 1970    Chief Complaint   Patient presents with    URI     itchy throat, dry throat, dry cough  puffy swollen eye in morning         HPI    Review of Systems    Patient Active Problem List   Diagnosis    History of breast cancer    Hyperlipidemia    Headache    Asthma    Malignant neoplasm of right female breast ([address]    Impaired glucose tolerance    Essential hypertension    Left foot pain    Obesity, Class III, BMI 40-49.9 (morbid obesity) (HCC)    Chronic pain of left lower extremity    Right-sided chest wall pain    Right lateral epicondylitis       Past Surgical History:   Procedure Laterality Date    BREAST BIOPSY      COLONOSCOPY      polyp. Repeat in     COLONOSCOPY  2016    diverticulosis    ELBOW FRACTURE SURGERY Right 12/10/2019    RIGHT LATERAL EPICONDYLITIS TENDON DEBRIDEMENT, BOSWORTH PROCEDURE performed by Deena Amezquita DO at [facility] 73 HYSTERECTOMY      Cysts, Endometriosis.   1 ovary remains    TONSILLECTOMY AND ADENOIDECTOMY      TUBAL LIGATION         Family History   Problem Relation Age of Onset    Hypertension Father     Stroke Maternal Aunt     Diabetes Paternal Aunt     Diabetes Paternal Uncle     Stroke Maternal Grandmother     Depression Son     Asthma Son     Stroke Mother 79         in 2017   Erasto Mcgowan Sister                Current Outpatient Medications on File Prior to Visit   Medication Sig Dispense Refill    beclomethasone (QVAR REDIHALER) 80 MCG/ACT AERB inhaler Inhale 2 puffs into the lungs 2 times daily 1 each 5    amitriptyline (ELAVIL) 25 MG tablet Take 1 tablet by mouth nightly 90 tablet 1    metoprolol succinate (TOPROL XL) 100 MG extended release tablet Take 1 tablet by mouth daily 90 tablet 1    atorvastatin (LIPITOR) 80 MG tablet Take 1 tablet by mouth daily 90 tablet 1    SUMAtriptan (IMITREX) 100 MG tablet Take 1 tablet by mouth 2 times daily as needed for Migraine 27 tablet 1    albuterol sulfate  (90 Base) MCG/ACT inhaler Inhale 2 puffs into the lungs every 4 hours as needed for Shortness of Breath 18 g 0    SUMAtriptan (IMITREX) 6 MG/0.5ML SOLN injection Inject 0.5 mLs into the skin daily as needed for Migraine Please dispense as Perla Chain NDC NDC [id number] 0.5 mL 5     Current Facility-Administered Medications on File Prior to Visit   Medication Dose Route Frequency Provider Last Rate Last Admin    promethazine (PHENERGAN) injection 25 mg  25 mg IntraMUSCular Q6H PRN Ronal Clarke MD   25 mg at 03/25/14 1541                   Objective:       ***    Physical Exam  Vitals:    11/10/21 1604   BP: 118/82   Site: Left Upper Arm   Position: Sitting   Cuff Size: Large Adult   Pulse: 76   Temp: 97.8 °F (36.6 °C)   TempSrc: Infrared   Weight: 262 lb 9.6 oz (119.1 kg)   Height: 5' 7.5\" (1.715 m)     Body mass index is 40.52 kg/m². Wt Readings from Last 3 Encounters:   11/10/21 262 lb 9.6 oz (119.1 kg)   10/20/21 265 lb 12.8 oz (120.6 kg)   08/26/21 262 lb 3.2 oz (118.9 kg)     BP Readings from Last 3 Encounters:   11/10/21 118/82   10/20/21 122/80   08/26/21 122/80          No results found for this visit on 11/10/21. Assessment:      {No diagnosis found. (Refresh or delete this SmartLink)}        Plan:      ***    No follow-ups on file.

## 2021-11-10 NOTE — PATIENT INSTRUCTIONS
PLEASE BRING YOUR MEDICATIONS TO ALL APPOINTMENTS    The diagnoses and medications listed in this after visit summary may not be accurate at the time of check out. Please check MY CHART in 28-48 hours for possible corrections. Late cancellation policy: So that we can better accommodate people who are sick, please give our office 24 hour notice for an appointment cancellation. Thank you. Missed appointments: Your care is very important to us. It is important that you keep your scheduled appointments. Multiple missed appointments will lead to a dismissal from the office. Later arrival policy: If you are more than 10 minutes late for your appointment, you will be asked to re-schedule. Please allow 5-7 business days for paperwork to be processed. It is important that you check your MY Chart messages, as they include appointment reminders, test results, and other important information. If you have forgotten your password, please call [phone]. HERE ARE SOME LIFE CHANGING TIPS      1. Take your blood pressure medications at bedtime to reduce your chance of heart attack or stroke  2. Fever in kids:  Give both Tylenol and Ibuprofen at the same time rather than staggering them   3. Follow these tips to reduce childhood obesity: Reduce unnecessary exposure to antibiotics, consume whole milk instead of skim milk, watch public TV instead of regular TV (less exposure to junk food commercials), and reduce traumatic experiences. 4. 1 egg per day is good for your heart  5. Alternate day fasting does promote weight loss. Skipping breakfast increases your risk of obesity  6. Artificially sweetened drinks increase all cause mortality (strokes, body mass index, cardiovascular disease)  7. Kale consumption can reduce onset of dementia  8. Walking at least 8000 steps per day and resistance exercise 2-3 x per week are good for your heart  9.  Brushing teeth 3 times per day can decrease chance of getting diabetes  10. Antibiotic use is associated with a lifetime increased risk of breast cancer and heart disease.

## 2021-11-11 LAB
SARS-COV-2: NOT DETECTED
SOURCE: NORMAL

## 2021-11-12 ENCOUNTER — TELEPHONE (OUTPATIENT)
Dept: FAMILY MEDICINE CLINIC | Age: 51
End: 2021-11-12

## 2021-11-12 DIAGNOSIS — R05.9 COUGH: Primary | ICD-10-CM

## 2021-11-12 RX ORDER — AZITHROMYCIN 250 MG/1
250 TABLET, FILM COATED ORAL DAILY
Qty: 1 PACKET | Refills: 0 | Status: SHIPPED | OUTPATIENT
Start: 2021-11-12 | End: 2021-11-19 | Stop reason: ALTCHOICE

## 2021-11-12 NOTE — TELEPHONE ENCOUNTER
Patient stated she was told to call back if she is not feeling any better. Patient stated nothing has changed her cough feels like it is getting worse, patient stated she fills like she has some chest congestion also.  Please advise     Thank you

## 2021-11-19 ENCOUNTER — VIRTUAL VISIT (OUTPATIENT)
Dept: FAMILY MEDICINE CLINIC | Age: 51
End: 2021-11-19
Payer: COMMERCIAL

## 2021-11-19 DIAGNOSIS — R05.9 COUGH: Primary | ICD-10-CM

## 2021-11-19 PROCEDURE — 99213 OFFICE O/P EST LOW 20 MIN: CPT | Performed by: FAMILY MEDICINE

## 2021-11-19 RX ORDER — LEVOFLOXACIN 500 MG/1
500 TABLET, FILM COATED ORAL DAILY
Qty: 10 TABLET | Refills: 0 | Status: SHIPPED | OUTPATIENT
Start: 2021-11-19 | End: 2021-11-29

## 2021-11-19 NOTE — PROGRESS NOTES
2021    TELEHEALTH EVALUATION -- Audio[facility]Visual [facility]    HPI:    Theresa Moy (:  1970) has requested an audio/video evaluation for the following concern(s):    Cough:   Chief Complaint   Patient presents with    Cough     cought is terrible and it hurts     Nasal Congestion     phew is dark green yellow color     Pharyngitis   SOB, cough is getting worse. Nasal congestion. Finished her [employer] on Monday.  is now in the hospital. He is in hospice care      Review of Systems    Prior to Visit Medications    Medication Sig Taking?  Authorizing Provider   levoFLOXacin (LEVAQUIN) 500 MG tablet Take 1 tablet by mouth daily for 10 days Yes Bebo Palmer MD   beclomethasone (QVAR REDIHALER) 80 MCG/ACT AERB inhaler Inhale 2 puffs into the lungs 2 times daily Yes Bebo Palmer MD   amitriptyline (ELAVIL) 25 MG tablet Take 1 tablet by mouth nightly Yes Bebo Palmer MD   metoprolol succinate (TOPROL XL) 100 MG extended release tablet Take 1 tablet by mouth daily Yes Bebo Palmer MD   atorvastatin (LIPITOR) 80 MG tablet Take 1 tablet by mouth daily Yes Bebo Palmer MD   SUMAtriptan (IMITREX) 100 MG tablet Take 1 tablet by mouth 2 times daily as needed for Migraine Yes Bebo Palmer MD   albuterol sulfate  (90 Base) MCG/ACT inhaler Inhale 2 puffs into the lungs every 4 hours as needed for Shortness of Breath Yes Bebo Palmer MD   SUMAtriptan (IMITREX) 6 MG/0.5ML SOLN injection Inject 0.5 mLs into the skin daily as needed for Migraine Please dispense as Susy Kwaku [facility] [id number]  Patient not taking: Reported on 2021  Bebo Palmer MD       Social History     Tobacco Use    Smoking status: Never Smoker    Smokeless tobacco: Never Used   Vaping Use    Vaping Use: Never used   Substance Use Topics    Alcohol use: Yes     Comment: occasionally    Drug use: No        Allergies   Allergen Reactions    Aloe Vera Rash   ,   Past Medical History:   Diagnosis Date    Arthritis     in right elbow    Asthma     last exacerbation 2014    Colon polyp 2010    Depression     Diverticulosis     colonoscopy 2016    Endometriosis     History of breast cancer 2009    lumpectomy r side chemo and radiation     HTN (hypertension) 9/18/2013    Hyperlipidemia 10/2012    Migraines 11/15/2019    LAST MIGRAINE 11/15/19    Ovarian cyst     Pre-diabetes     no meds    Seasonal allergies     Uterine fibroid        PHYSICAL EXAMINATION:  [ INSTRUCTIONS:  \"[x]\" Indicates a positive item  \"[]\" Indicates a negative item  -- DELETE ALL ITEMS NOT EXAMINED]  Vital Signs: (As obtained by patient/caregiver or practitioner observation)    Blood pressure-  Heart rate-    Respiratory rate-    Temperature-  Pulse oximetry-     Constitutional: [] Appears well-developed and well-nourished [] No apparent distress      [x] Abnormal-appears ill and tired. She is sniffing. Mental status  [x] Alert and awake  [x] Oriented to person/place/time [x]Able to follow commands      Eyes:  EOM    [x]  Normal  [] Abnormal-  Sclera  []  Normal  [] Abnormal -         Discharge []  None visible  [] Abnormal -    HENT:   [x] Normocephalic, atraumatic.   [] Abnormal   [] Mouth/Throat: Mucous membranes are moist.     External Ears [x] Normal  [] Abnormal-     Neck: [x] No visualized mass     Pulmonary/Chest: [x] Respiratory effort normal.  [x] No visualized signs of difficulty breathing or respiratory distress        [] Abnormal-      Musculoskeletal:   [x] Normal gait with no signs of ataxia         [] Normal range of motion of neck        [] Abnormal-       Neurological:        [x] No Facial Asymmetry (Cranial nerve 7 motor function) (limited exam to video visit)          [] No gaze palsy        [] Abnormal-         Skin:        [x] No significant exanthematous lesions or discoloration noted on facial skin         [] Abnormal-            Psychiatric:       [x] Normal Affect [x] No Hallucinations        [] Abnormal-     Other pertinent observable physical exam findings-      Diagnosis Orders   1. Cough  levoFLOXacin (LEVAQUIN) 500 MG tablet       Recommend she talk to 's doctor regarding FMLA    Return if symptoms worsen or fail to improve. Zohaib Gonzales, was evaluated through a synchronous (real-time) audio-video encounter. The patient (or guardian if applicable) is aware that this is a billable service. Verbal consent to proceed has been obtained within the past 12 months. The visit was conducted pursuant to the emergency declaration under the [address] [place] [address], [address] [place] [address] authority and the [employer] General Act. Patient identification was verified, and a caregiver was present when appropriate. The patient was located in a state where the provider was credentialed to provide care. Total time spent on this encounter: Not billed by time    --Luz Richey MD on 11/19/2021 at 11:59 AM    An electronic signature was used to authenticate this note.

## 2021-11-22 ENCOUNTER — HOSPITAL ENCOUNTER (EMERGENCY)
Age: 51
Discharge: HOME | End: 2021-11-22
Payer: COMMERCIAL

## 2021-11-22 ENCOUNTER — APPOINTMENT (OUTPATIENT)
Dept: GENERAL RADIOLOGY | Age: 51
End: 2021-11-22
Payer: COMMERCIAL

## 2021-11-22 VITALS
HEART RATE: 77 BPM | SYSTOLIC BLOOD PRESSURE: 173 MMHG | WEIGHT: 261 LBS | OXYGEN SATURATION: 100 % | RESPIRATION RATE: 17 BRPM | BODY MASS INDEX: 40.28 KG/M2 | TEMPERATURE: 98.4 F | DIASTOLIC BLOOD PRESSURE: 88 MMHG

## 2021-11-22 DIAGNOSIS — J45.909 UNCOMPLICATED ASTHMA, UNSPECIFIED ASTHMA SEVERITY, UNSPECIFIED WHETHER PERSISTENT: ICD-10-CM

## 2021-11-22 DIAGNOSIS — R05.9 COUGH: ICD-10-CM

## 2021-11-22 DIAGNOSIS — J06.9 ACUTE UPPER RESPIRATORY INFECTION: Primary | ICD-10-CM

## 2021-11-22 PROCEDURE — 6370000000 HC RX 637 (ALT 250 FOR IP): Performed by: PHYSICIAN ASSISTANT

## 2021-11-22 PROCEDURE — 71046 X-RAY EXAM CHEST 2 VIEWS: CPT

## 2021-11-22 PROCEDURE — 93005 ELECTROCARDIOGRAM TRACING: CPT | Performed by: EMERGENCY MEDICINE

## 2021-11-22 PROCEDURE — 99285 EMERGENCY DEPT VISIT HI MDM: CPT

## 2021-11-22 RX ORDER — IPRATROPIUM BROMIDE AND ALBUTEROL SULFATE 2.5; .5 MG/3ML; MG/3ML
1 SOLUTION RESPIRATORY (INHALATION) ONCE
Status: DISCONTINUED | OUTPATIENT
Start: 2021-11-22 | End: 2021-11-22

## 2021-11-22 RX ORDER — GUAIFENESIN AND CODEINE PHOSPHATE 100; 10 MG/5ML; MG/5ML
5 SOLUTION ORAL 3 TIMES DAILY PRN
Qty: 118 ML | Refills: 0 | Status: SHIPPED | OUTPATIENT
Start: 2021-11-22 | End: 2021-11-29

## 2021-11-22 RX ORDER — PREDNISONE 20 MG/1
60 TABLET ORAL ONCE
Status: COMPLETED | OUTPATIENT
Start: 2021-11-22 | End: 2021-11-22

## 2021-11-22 RX ORDER — PREDNISONE 20 MG/1
TABLET ORAL
Qty: 18 TABLET | Refills: 0 | Status: SHIPPED | OUTPATIENT
Start: 2021-11-22 | End: 2022-01-20 | Stop reason: ALTCHOICE

## 2021-11-22 RX ORDER — GUAIFENESIN/DEXTROMETHORPHAN 100-10MG/5
5 SYRUP ORAL ONCE
Status: COMPLETED | OUTPATIENT
Start: 2021-11-22 | End: 2021-11-22

## 2021-11-22 RX ADMIN — PREDNISONE 60 MG: 20 TABLET ORAL at 10:24

## 2021-11-22 RX ADMIN — GUAIFENESIN AND DEXTROMETHORPHAN 5 ML: 100; 10 SYRUP ORAL at 10:24

## 2021-11-22 NOTE — ED PROVIDER NOTES
EMERGENCY DEPARTMENT ENCOUNTER      PCP: Violette Stokes MD    [address] [facility]    Chief Complaint   Patient presents with    Cough     x3 weeks    Shortness of Breath     Of note, patient was not evaluated by attending physician, attending physician was available for consultation. Aliyah Hayden HPI    Dianelys Moulton is a 45 y.o. female who presents to the emergency department today with a persistent cough, intermittent shortness of breath. States been ongoing for the last 2-3 weeks. Patient been seen by primary care placed on azithromycin and is currently on Levaquin, patient states that her cough does not appear to be improving, has developed into a sputum producing cough. Said no fevers or chills. No chest pain no significant shortness of breath. Patient does have history of asthma and does feel that she is intermittently wheezing. No dyspnea on exertion no peripheral edema denies any significant heart issues. Has been vaccinated for COVID-19. Has had a recent test that was also negative. REVIEW OF SYSTEMS    Constitutional:  Denies fever, chills, weight loss or weakness   HENT:  Denies sore throat or ear pain   Cardiovascular:  No chest pain. No palpitations, No syncope  Respiratory:  See HPI. GI:  Denies abdominal pain, nausea, vomiting, or diarrhea  :  Denies any urinary symptoms.    Musculoskeletal:  Denies back pain,   Skin:  Denies rash  Neurologic:  Denies headache, focal weakness or sensory changes   Endocrine:  Denies polyuria or polydypsia   Lymphatic:  Denies swollen glands   All other review of systems are negative  See HPI and nursing notes for additional information     PAST MEDICAL & SURGICAL HISTORY    Past Medical History:   Diagnosis Date    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 Repeat in 2015    COLONOSCOPY  12/12/2016    diverticulosis    ELBOW FRACTURE SURGERY Right 12/10/2019    RIGHT LATERAL EPICONDYLITIS TENDON DEBRIDEMENT, BOSWORTH PROCEDURE performed by Minnie Dumont DO at [facility] HYSTERECTOMY      Cysts, Endometriosis. 1 ovary remains    TONSILLECTOMY AND ADENOIDECTOMY  1987    TUBAL LIGATION  1993       CURRENT MEDICATIONS    Current Outpatient Rx   Medication Sig Dispense Refill    guaiFENesin-codeine (GUAIFENESIN AC) 100-10 MG/5ML syrup Take 5 mLs by mouth 3 times daily as needed for Cough for up to 7 days.  118 mL 0    predniSONE (DELTASONE) 20 MG tablet Take 3 tabs PO qd for 3 days, then 2 tabs PO qd for 3 days, then 1 tab PO qd for 3 days, Disp-18 tablet, R-0 18 tablet 0    levoFLOXacin (LEVAQUIN) 500 MG tablet Take 1 tablet by mouth daily for 10 days 10 tablet 0    beclomethasone (QVAR REDIHALER) 80 MCG/ACT AERB inhaler Inhale 2 puffs into the lungs 2 times daily 1 each 5    amitriptyline (ELAVIL) 25 MG tablet Take 1 tablet by mouth nightly 90 tablet 1    metoprolol succinate (TOPROL XL) 100 MG extended release tablet Take 1 tablet by mouth daily 90 tablet 1    atorvastatin (LIPITOR) 80 MG tablet Take 1 tablet by mouth daily 90 tablet 1    SUMAtriptan (IMITREX) 100 MG tablet Take 1 tablet by mouth 2 times daily as needed for Migraine 27 tablet 1    albuterol sulfate  (90 Base) MCG/ACT inhaler Inhale 2 puffs into the lungs every 4 hours as needed for Shortness of Breath 18 g 0    SUMAtriptan (IMITREX) 6 MG/0.5ML SOLN injection Inject 0.5 mLs into the skin daily as needed for Migraine Please dispense as Nino Mcburney [address] [phone] in the Last Year: Not on file     Family History   Problem Relation Age of Onset    Hypertension Father     Stroke Maternal Aunt     Diabetes Paternal Aunt     Diabetes Paternal Uncle     Stroke Maternal Grandmother     Depression Son     Asthma Son     Stroke Mother 79         in 2017    Lung Cancer Sister              PHYSICAL EXAM    VITAL SIGNS: BP (!) 173/88   Pulse 77   Temp 98.4 °F (36.9 °C) (Oral)   Resp 17   Wt 261 lb (118.4 kg)   LMP  (LMP Unknown)   SpO2 100%   BMI 40.28 kg/m²    Constitutional:  Well developed, well nourished, no acute distress   HENT:  Atraumatic, moist mucus membranes  Neck/Lymphatics: supple, no JVD, no swollen nodes  Respiratory:   Nonlabored breathing. Rate 17. Lungs no significant adventitious sounds or rhonchi, there is inspiratory next Tory wheezing noted on bilateral lung fields, no retractions   Cardiovascular:  Rate regular, normal Rhythm,  no murmurs/rubs/gallops. No carotid bruits or murmurs heard in carotids. No JVD  GI:  Soft, nontender, normal bowel sounds  Musculoskeletal:    There is no edema, asymmetry, or calf / thigh tenderness bilaterally. No cyanosis. No cool or pale-appearing limb. Distal cap refill and pulses intact bilateral upper and lower extremities  Bilateral upper and lower extremity ROM intact without pain or obvious deficit  Integument:  Skin is warm and dry, no petechiae   Neurologic:  Alert & oriented, no slurred speech  Psych: Pleasant affect, no hallucinations    EKG   see supervising physician's note for EKG interpretation. LABS:  No results found for this visit on 21. RADIOLOGY/PROCEDURES    XR CHEST (2 VW)    Result Date: 2021  EXAMINATION: TWO XRAY VIEWS OF THE CHEST 2021 10:46 am COMPARISON: 2019.  HISTORY: ORDERING SYSTEM PROVIDED HISTORY: persistent cough, TECHNOLOGIST PROVIDED HISTORY: Reason for exam:->persistent cough, Reason for Exam: persistent cough    sib Acuity: Unknown Type of Exam: Unknown Relevant Medical/Surgical History: asthma FINDINGS: The heart size is within normal limits. The pulmonary vasculature is also within normal limits. No acute infiltrates are seen. The costophrenic angles are sharp bilaterally. No pneumothoraces are noted. 1. No active pulmonary disease. ED COURSE & MEDICAL DECISION MAKING      Patient presents as above. Emergent etiologies considered. Patient has had a lingering cough that is now developed to be a sputum producing cough, some intermittent wheezing and shortness of breath. No active fevers. No nasal congestion, sore throat, ear pain. Currently on antibiotics. Denying chest pain. No signs of peripheral edema. No hemoptysis. EKG demonstrating normal sinus rhythm of 73 bpm without signs of ST elevation or developing signs of ischemia or life-threatening arrhythmia. Chest x-ray acutely negative. Will initiate patient on steroid, cough medication, advised to use her inhaler and to continue her antibiotics. Will encourage outpatient follow-up as needed. Will be discharged home in stable condition. Patient agrees to return emergency department if symptoms worsen or any new symptoms develop. Vital signs and nursing notes reviewed during ED course. All pertinent Lab data and radiographic results reviewed with patient at bedside. The patient and/or the family were informed of the results of any tests/labs/imaging, the treatment plan, and time was allotted to answer questions. Clinical  IMPRESSION    1. Acute upper respiratory infection    2. Cough    3. Uncomplicated asthma, unspecified asthma severity, unspecified whether persistent      Comment: Please note this report has been produced using speech recognition software and may contain errors related to that system including errors in grammar, punctuation, and spelling, as well as words and phrases that may be inappropriate.  If there are any questions or concerns please feel free to contact the dictating provider for clarification.                         Deni Montanez 411, PA  11/22/21 4187

## 2021-11-22 NOTE — ED PROVIDER NOTES
EKG is interpreted by me. EKG shows sinus rhythm at 73 bpm, axis is nondeviated, no remarkable ST segment elevations or depressions, T waves are unremarkable, KS interval 138, QRS duration of 82, QTc of 420. Final impression, nonspecific EKG.     John Brambila MD  11/22/2021  9:37 AM        John Brambila MD  11/22/21 9684

## 2021-11-22 NOTE — ED TRIAGE NOTES
Pt presents to ED from home for cough x 3 weeks that is worse at night.  Pt also states she has been feeling more SOB, hx of asthma

## 2021-11-22 NOTE — Clinical Note
Queenie Gomes was seen and treated in our emergency department on 11/22/2021. She may return to work on 11/29/2021. If you have any questions or concerns, please don't hesitate to call.       Deni Montanez 411, PA

## 2021-11-23 LAB
EKG ATRIAL RATE: 73 BPM
EKG DIAGNOSIS: NORMAL
EKG P AXIS: 52 DEGREES
EKG P-R INTERVAL: 138 MS
EKG Q-T INTERVAL: 382 MS
EKG QRS DURATION: 82 MS
EKG QTC CALCULATION (BAZETT): 420 MS
EKG R AXIS: 20 DEGREES
EKG T AXIS: 33 DEGREES
EKG VENTRICULAR RATE: 73 BPM

## 2021-11-23 PROCEDURE — 93010 ELECTROCARDIOGRAM REPORT: CPT | Performed by: INTERNAL MEDICINE

## 2021-11-29 ENCOUNTER — TELEPHONE (OUTPATIENT)
Dept: FAMILY MEDICINE CLINIC | Age: 51
End: 2021-11-29

## 2021-11-29 NOTE — TELEPHONE ENCOUNTER
----- Message from Kyra Mirna sent at 11/26/2021  3:01 PM EST -----  Subject: Message to Provider    QUESTIONS  Information for Provider? PATIENT IS REQUESTING ALBUTEROL FOR HER   BREATHING MACHINE, SHE WAS IN URGENT CARE AND THEY WOULD NOT GIVE HER A   TREATMENT, SHE DOES HOWEVER HAVE A MACHINE   ---------------------------------------------------------------------------  --------------  CALL BACK INFO  What is the best way for the office to contact you? OK to leave message on   voicemail  Preferred Call Back Phone Number? [phone]  ---------------------------------------------------------------------------  --------------  SCRIPT ANSWERS  Relationship to Patient?  Self

## 2021-12-06 ENCOUNTER — TELEPHONE (OUTPATIENT)
Dept: GASTROENTEROLOGY | Age: 51
End: 2021-12-06

## 2021-12-21 ENCOUNTER — TELEPHONE (OUTPATIENT)
Dept: GASTROENTEROLOGY | Age: 51
End: 2021-12-21

## 2022-01-20 ENCOUNTER — OFFICE VISIT (OUTPATIENT)
Dept: FAMILY MEDICINE CLINIC | Age: 52
End: 2022-01-20
Payer: COMMERCIAL

## 2022-01-20 VITALS
HEIGHT: 68 IN | TEMPERATURE: 97 F | SYSTOLIC BLOOD PRESSURE: 138 MMHG | HEART RATE: 70 BPM | DIASTOLIC BLOOD PRESSURE: 84 MMHG | OXYGEN SATURATION: 98 % | BODY MASS INDEX: 39.59 KG/M2 | WEIGHT: 261.2 LBS

## 2022-01-20 DIAGNOSIS — R07.9 RIGHT-SIDED CHEST PAIN: Primary | ICD-10-CM

## 2022-01-20 DIAGNOSIS — F43.21 GRIEF: ICD-10-CM

## 2022-01-20 DIAGNOSIS — Z23 NEED FOR COVID-19 VACCINE: ICD-10-CM

## 2022-01-20 DIAGNOSIS — Z12.11 SCREEN FOR COLON CANCER: ICD-10-CM

## 2022-01-20 PROCEDURE — 99213 OFFICE O/P EST LOW 20 MIN: CPT | Performed by: FAMILY MEDICINE

## 2022-01-20 ASSESSMENT — PATIENT HEALTH QUESTIONNAIRE - PHQ9
6. FEELING BAD ABOUT YOURSELF - OR THAT YOU ARE A FAILURE OR HAVE LET YOURSELF OR YOUR FAMILY DOWN: 0
4. FEELING TIRED OR HAVING LITTLE ENERGY: 3
9. THOUGHTS THAT YOU WOULD BE BETTER OFF DEAD, OR OF HURTING YOURSELF: 0
SUM OF ALL RESPONSES TO PHQ QUESTIONS 1-9: 12
2. FEELING DOWN, DEPRESSED OR HOPELESS: 2
SUM OF ALL RESPONSES TO PHQ QUESTIONS 1-9: 12
5. POOR APPETITE OR OVEREATING: 3
SUM OF ALL RESPONSES TO PHQ QUESTIONS 1-9: 12
SUM OF ALL RESPONSES TO PHQ9 QUESTIONS 1 & 2: 4
7. TROUBLE CONCENTRATING ON THINGS, SUCH AS READING THE NEWSPAPER OR WATCHING TELEVISION: 1
8. MOVING OR SPEAKING SO SLOWLY THAT OTHER PEOPLE COULD HAVE NOTICED. OR THE OPPOSITE, BEING SO FIGETY OR RESTLESS THAT YOU HAVE BEEN MOVING AROUND A LOT MORE THAN USUAL: 0
3. TROUBLE FALLING OR STAYING ASLEEP: 1
10. IF YOU CHECKED OFF ANY PROBLEMS, HOW DIFFICULT HAVE THESE PROBLEMS MADE IT FOR YOU TO DO YOUR WORK, TAKE CARE OF THINGS AT HOME, OR GET ALONG WITH OTHER PEOPLE: 1
1. LITTLE INTEREST OR PLEASURE IN DOING THINGS: 2
SUM OF ALL RESPONSES TO PHQ QUESTIONS 1-9: 12

## 2022-01-20 NOTE — PATIENT INSTRUCTIONS
PLEASE BRING YOUR MEDICATIONS TO ALL APPOINTMENTS    The diagnoses and medications listed in this after visit summary may not be accurate at the time of check out. Please check MY CHART in 28-48 hours for possible corrections. Late cancellation policy: So that we can better accommodate people who are sick, please give our office 24 hour notice for an appointment cancellation. Missed appointments: Your care is very important to us. It is important that you keep your scheduled appointments. Multiple missed appointments will lead to a dismissal from the office. Patients arriving late will be worked into the schedule as time permits, with patients arriving on time taken as scheduled. Late arriving patients are more than welcome to wait or reschedule their appointments. Please allow 5-7 business days for paperwork to be processed. It is important that you check your MY Chart messages, as they include appointment reminders, test results, and other important information. If you have forgotten your password, please call [phone]. HERE ARE SOME LIFE CHANGING TIPS      1. Take your blood pressure medications at bedtime to reduce your chance of heart attack or stroke  2. Fever in kids:  Give both Tylenol and Ibuprofen at the same time rather than staggering them   3. Follow these tips to reduce childhood obesity: Reduce unnecessary exposure to antibiotics, consume whole milk instead of skim milk, watch public TV instead of regular TV (less exposure to junk food commercials), and reduce traumatic experiences. 4. 1 egg per day is good for your heart  5. Alternate day fasting does promote weight loss. Skipping breakfast increases your risk of obesity  6. Artificially sweetened drinks increase all cause mortality (strokes, body mass index, cardiovascular disease)  7. Kale consumption can reduce onset of dementia  8.  Walking at least 8000 steps per day and resistance exercise 2-3 x per week are good for your heart  9. Brushing teeth 3 times per day can decrease chance of getting diabetes  10. Antibiotic use is associated with a lifetime increased risk of breast cancer and heart disease. Patient Education        Healthy Upper Back: Exercises  Introduction  Here are some examples of exercises for your upper back. Start each exercise slowly. Ease off the exercise if you start to have pain. Your doctor or physical therapist will tell you when you can start these exercises and which ones will work best for you. How to do the exercises  Lower neck and upper back stretch    1. Stretch your arms out in front of your body. Clasp one hand on top of your other hand. 2. Gently reach out so that you feel your shoulder blades stretching away from each other. 3. Gently bend your head forward. 4. Hold for 15 to 30 seconds. 5. Repeat 2 to 4 times. Midback stretch    If you have knee pain, do not do this exercise. 1. Kneel on the floor, and sit back on your ankles. 2. Lean forward, place your hands on the floor, and stretch your arms out in front of you. Rest your head between your arms. 3. Gently push your chest toward the floor, reaching as far in front of you as possible. 4. Hold for 15 to 30 seconds. 5. Repeat 2 to 4 times. Shoulder rolls    1. Sit comfortably with your feet shoulder-width apart. You can also do this exercise while standing. 2. Roll your shoulders up, then back, and then down in a smooth, circular motion. 3. Repeat 2 to 4 times. Wall push-up    1. Stand against a wall with your feet about 12 to 24 inches back from the wall. If you feel any pain when you do this exercise, stand closer to the wall. 2. Place your hands on the wall slightly wider apart than your shoulders, and lean forward. 3. Gently lean your body toward the wall. Then push back to your starting position. Keep the motion smooth and controlled. 4. Repeat 8 to 12 times.   Resisted shoulder blade squeeze    For this exercise, you will need elastic exercise material, such as surgical tubing or Thera-Band. 1. Sit or stand, holding the band in both hands in front of you. Keep your elbows close to your sides, bent at a 90-degree angle. Your palms should face up. 2. Squeeze your shoulder blades together, and move your arms to the outside, stretching the band. Be sure to keep your elbows at your sides while you do this. 3. Relax. 4. Repeat 8 to 12 times. Resisted rows    For this exercise, you will need elastic exercise material, such as surgical tubing or Thera-Band. 1. Put the band around a solid object, such as a bedpost, at about waist level. Hold one end of the band in each hand. 2. With your elbows at your sides and bent to 90 degrees, pull the band back to move your shoulder blades toward each other. Return to the starting position. 3. Repeat 8 to 12 times. Follow-up care is a key part of your treatment and safety. Be sure to make and go to all appointments, and call your doctor if you are having problems. It's also a good idea to know your test results and keep a list of the medicines you take. Where can you learn more? Go to https://[email or web address].Thumb Friendly. org and sign in to your Spock account. Enter I333 in the WakeMate box to learn more about \"Healthy Upper Back: Exercises. \"     If you do not have an account, please click on the \"Sign Up Now\" link. Current as of: July 1, 2021               Content Version: 13.1  © 2006-2021 Healthwise, Incorporated. Care instructions adapted under license by [place] [facility] [facility] ([facility]). If you have questions about a medical condition or this instruction, always ask your healthcare professional. [address] any warranty or liability for your use of this information.

## 2022-01-20 NOTE — PROGRESS NOTES
Patient ID: Hafsa Roberts 1970    . Chief Complaint   Patient presents with    Chest Pain    Arm Pain     after chest pain, the pain runs to her right arm          HPI     Chest pain: right sided. Goes to right arm and to her back. Getting more frequent. Daily. Lasts 2-3 minutes. No relation to eating. No triggers. Onset 2 months ago. Has not tried anything .   7/10. Burning and aching. No more than 5 times per day    Grief:   last month colon cancer    Review of Systems    Patient Active Problem List   Diagnosis    History of breast cancer    Hyperlipidemia    Headache    Asthma    Malignant ne[facility] of right female breast ([facility] [address]    Impaired glucose tolerance    Essential hypertension    Left foot pain    Obesity, Class III, BMI 40-49.9 (morbid obesity) ([facility] [address]    Chronic pain of left lower extremity    Right-sided chest wall pain    Right lateral epicondylitis       Past Surgical History:   Procedure Laterality Date    BREAST BIOPSY      COLONOSCOPY      polyp. Repeat in     COLONOSCOPY  2016    diverticulosis    ELBOW FRACTURE SURGERY Right 12/10/2019    RIGHT LATERAL EPICONDYLITIS TENDON DEBRIDEMENT, BOSWORTH PROCEDURE performed by Maria Isabel Dos Santos DO at [facility] HYSTERECTOMY      Cysts, Endometriosis.   1 ovary remains    TONSILLECTOMY AND ADENOIDECTOMY      TUBAL LIGATION         Family History   Problem Relation Age of Onset    Hypertension Father     Stroke Maternal Aunt     Diabetes Paternal Aunt     Diabetes Paternal Uncle     Stroke Maternal Grandmother     Depression Son     Asthma Son     Stroke Mother 79         in 2017   Hakeem Jose Sister                Current Outpatient Medications on File Prior to Visit   Medication Sig Dispense Refill    beclomethasone (QVAR REDIHALER) 80 MCG/ACT AERB inhaler Inhale 2 puffs into the lungs 2 times daily 1 each 5    amitriptyline (ELAVIL) 25 MG tablet Take 1 tablet by mouth nightly 90 tablet 1    metoprolol succinate (TOPROL XL) 100 MG extended release tablet Take 1 tablet by mouth daily 90 tablet 1    atorvastatin (LIPITOR) 80 MG tablet Take 1 tablet by mouth daily 90 tablet 1    albuterol sulfate  (90 Base) MCG/ACT inhaler Inhale 2 puffs into the lungs every 4 hours as needed for Shortness of Breath 18 g 0    predniSONE (DELTASONE) 20 MG tablet Take 3 tabs PO qd for 3 days, then 2 tabs PO qd for 3 days, then 1 tab PO qd for 3 days, Disp-18 tablet, R-0 18 tablet 0    SUMAtriptan (IMITREX) 100 MG tablet Take 1 tablet by mouth 2 times daily as needed for Migraine (Patient not taking: Reported on 1/20/2022) 27 tablet 1    SUMAtriptan (IMITREX) 6 MG/0.5ML SOLN injection Inject 0.5 mLs into the skin daily as needed for Migraine Please dispense as [facility] [id number] (Patient not taking: Reported on 11/19/2021) 0.5 mL 5     Current Facility-Administered Medications on File Prior to Visit   Medication Dose Route Frequency Provider Last Rate Last Admin    promethazine (PHENERGAN) injection 25 mg  25 mg IntraMUSCular Q6H PRN Neftali Arriola MD   25 mg at 03/25/14 1541                   Objective:         Physical Exam  Vitals and nursing note reviewed. Constitutional:       General: She is not in acute distress. Appearance: Normal appearance. She is well-developed. HENT:      Head: Normocephalic and atraumatic. Right Ear: Hearing, tympanic membrane and external ear normal.      Left Ear: Hearing, tympanic membrane and external ear normal.      Nose: Nose normal. No nasal deformity, laceration, mucosal edema or rhinorrhea. Mouth/Throat:      Lips: Pink. Mouth: Mucous membranes are moist. No oral lesions. Tongue: No lesions. Tongue does not deviate from midline. Pharynx: Oropharynx is clear. No oropharyngeal exudate or posterior oropharyngeal erythema. Tonsils: No tonsillar exudate or tonsillar abscesses.    Eyes: General: Lids are normal.      Conjunctiva/sclera: Conjunctivae normal.      Pupils: Pupils are equal, round, and reactive to light. Neck:      Thyroid: No thyroid mass or thyromegaly. Trachea: No tracheal deviation. Cardiovascular:      Rate and Rhythm: Normal rate and regular rhythm. Heart sounds: Normal heart sounds, S1 normal and S2 normal. No friction rub. No gallop. Pulmonary:      Effort: Pulmonary effort is normal. No respiratory distress. Breath sounds: Normal breath sounds. No wheezing or rales. Abdominal:      General: There is no distension. Palpations: Abdomen is soft. There is no mass. Tenderness: There is no abdominal tenderness. Musculoskeletal:      Cervical back: Normal range of motion and neck supple. No spinous process tenderness. Normal range of motion. Thoracic back: No deformity. Normal range of motion. Right lower leg: No edema. Left lower leg: No edema. Lymphadenopathy:      Cervical:      Right cervical: No superficial, deep or posterior cervical adenopathy. Left cervical: No superficial, deep or posterior cervical adenopathy. Skin:     General: Skin is warm and dry. Neurological:      Mental Status: She is alert and oriented to person, place, and time. Psychiatric:         Attention and Perception: She is attentive. Speech: Speech normal.         Behavior: Behavior normal.         Thought Content: Thought content normal.         Judgment: Judgment normal.       Vitals:    01/20/22 1013   BP: 138/84   Site: Left Upper Arm   Position: Sitting   Cuff Size: Large Adult   Pulse: 70   Temp: 97 °F (36.1 °C)   TempSrc: Infrared   SpO2: 98%   Weight: 261 lb 3.2 oz (118.5 kg)   Height: 5' 7.5\" (1.715 m)     Body mass index is 40.31 kg/m².      Wt Readings from Last 3 Encounters:   01/20/22 261 lb 3.2 oz (118.5 kg)   11/22/21 261 lb (118.4 kg)   11/10/21 262 lb 9.6 oz (119.1 kg)     BP Readings from Last 3 Encounters:   01/20/22 138/84   11/22/21 (!) 173/88   11/10/21 118/82                Assessment:       Diagnosis Orders   1. Right-sided chest pain  US GALLBLADDER RUQ   2. Need for COVID-19 vaccine     3. Screen for colon cancer     4. Grief             Plan:      Reminded colon cancer screening    I am not sure if the right-sided chest pain is musculoskeletal or GI. I did review back exercises with her and gave her printout at the . We will check gallbladder ultrasound and recheck her in 2 weeks. I am sorry about the loss of her . Return in about 2 weeks (around 2/3/2022) for need ultrasound. right chest pain.

## 2022-02-21 ENCOUNTER — HOSPITAL ENCOUNTER (OUTPATIENT)
Dept: ULTRASOUND IMAGING | Age: 52
Discharge: HOME | End: 2022-02-21
Payer: COMMERCIAL

## 2022-02-21 DIAGNOSIS — R07.9 RIGHT-SIDED CHEST PAIN: ICD-10-CM

## 2022-02-21 PROCEDURE — 76705 ECHO EXAM OF ABDOMEN: CPT

## 2022-07-12 ENCOUNTER — OFFICE VISIT (OUTPATIENT)
Dept: FAMILY MEDICINE CLINIC | Age: 52
End: 2022-07-12
Payer: COMMERCIAL

## 2022-07-12 VITALS
SYSTOLIC BLOOD PRESSURE: 126 MMHG | HEART RATE: 63 BPM | BODY MASS INDEX: 41.01 KG/M2 | TEMPERATURE: 97.2 F | RESPIRATION RATE: 14 BRPM | OXYGEN SATURATION: 97 % | DIASTOLIC BLOOD PRESSURE: 80 MMHG | WEIGHT: 270.6 LBS | HEIGHT: 68 IN

## 2022-07-12 DIAGNOSIS — R63.5 WEIGHT GAIN: ICD-10-CM

## 2022-07-12 DIAGNOSIS — E78.5 HYPERLIPIDEMIA, UNSPECIFIED HYPERLIPIDEMIA TYPE: ICD-10-CM

## 2022-07-12 DIAGNOSIS — Z85.3 HISTORY OF BREAST CANCER: ICD-10-CM

## 2022-07-12 DIAGNOSIS — Z12.11 SCREEN FOR COLON CANCER: ICD-10-CM

## 2022-07-12 DIAGNOSIS — R14.3 FLATULENCE: ICD-10-CM

## 2022-07-12 DIAGNOSIS — I10 ESSENTIAL HYPERTENSION: Primary | ICD-10-CM

## 2022-07-12 DIAGNOSIS — F43.21 GRIEF: ICD-10-CM

## 2022-07-12 DIAGNOSIS — Z12.31 SCREENING MAMMOGRAM FOR BREAST CANCER: ICD-10-CM

## 2022-07-12 DIAGNOSIS — F51.04 PSYCHOPHYSIOLOGICAL INSOMNIA: ICD-10-CM

## 2022-07-12 DIAGNOSIS — R45.89 MOODINESS: ICD-10-CM

## 2022-07-12 DIAGNOSIS — R51.9 HEADACHE DISORDER: ICD-10-CM

## 2022-07-12 DIAGNOSIS — J45.20 MILD INTERMITTENT ASTHMA WITHOUT COMPLICATION: ICD-10-CM

## 2022-07-12 LAB
CHOLESTEROL, TOTAL: 241 MG/DL (ref 0–199)
HDLC SERPL-MCNC: 45 MG/DL (ref 40–60)
LDL CHOLESTEROL CALCULATED: 156 MG/DL
TRIGL SERPL-MCNC: 201 MG/DL (ref 0–150)
VLDLC SERPL CALC-MCNC: 40 MG/DL

## 2022-07-12 PROCEDURE — 99214 OFFICE O/P EST MOD 30 MIN: CPT | Performed by: FAMILY MEDICINE

## 2022-07-12 PROCEDURE — 36415 COLL VENOUS BLD VENIPUNCTURE: CPT | Performed by: FAMILY MEDICINE

## 2022-07-12 RX ORDER — METOPROLOL SUCCINATE 100 MG/1
100 TABLET, EXTENDED RELEASE ORAL DAILY
Qty: 90 TABLET | Refills: 1 | Status: SHIPPED | OUTPATIENT
Start: 2022-07-12

## 2022-07-12 RX ORDER — ALBUTEROL SULFATE 90 UG/1
2 AEROSOL, METERED RESPIRATORY (INHALATION) EVERY 4 HOURS PRN
Qty: 18 G | Refills: 1 | Status: SHIPPED | OUTPATIENT
Start: 2022-07-12

## 2022-07-12 RX ORDER — SUMATRIPTAN 6 MG/.5ML
6 INJECTION, SOLUTION SUBCUTANEOUS DAILY PRN
Qty: 0.5 ML | Refills: 5 | Status: SHIPPED | OUTPATIENT
Start: 2022-07-12

## 2022-07-12 RX ORDER — AMITRIPTYLINE HYDROCHLORIDE 25 MG/1
25 TABLET, FILM COATED ORAL NIGHTLY
Qty: 90 TABLET | Refills: 1 | Status: SHIPPED | OUTPATIENT
Start: 2022-07-12

## 2022-07-12 RX ORDER — SUMATRIPTAN 100 MG/1
100 TABLET, FILM COATED ORAL 2 TIMES DAILY PRN
Qty: 27 TABLET | Refills: 1 | Status: SHIPPED | OUTPATIENT
Start: 2022-07-12

## 2022-07-12 RX ORDER — ATORVASTATIN CALCIUM 80 MG/1
80 TABLET, FILM COATED ORAL DAILY
Qty: 90 TABLET | Refills: 1 | Status: SHIPPED | OUTPATIENT
Start: 2022-07-12

## 2022-07-12 ASSESSMENT — ENCOUNTER SYMPTOMS: SHORTNESS OF BREATH: 0

## 2022-07-12 NOTE — PROGRESS NOTES
problem. The current episode started more than 1 year ago. The problem occurs rarely. Associated symptoms include headaches. Pertinent negatives include no chest pain. Her symptoms are alleviated by beta-agonist. Her past medical history is significant for asthma. Grief:   several months ago. Father  3 months ago. He is doing okay right now. However things are hard. Employment problem: Despite being requalified, patient has noted she has been passed over for promotions. Less qualified people were getting promotions. Therefore she resigned from her job recently. She is still looking for work. Insomnia: The amitriptyline helps. Gastrointestinal problem: Concerned about recurring flatulence. Is preferring colonoscopy to fit test    Review of Systems   Respiratory: Negative for shortness of breath. Cardiovascular: Negative for chest pain and palpitations. Neurological: Positive for headaches. Psychiatric/Behavioral: Negative for dysphoric mood. Patient Active Problem List   Diagnosis    History of breast cancer    Hyperlipidemia    Headache    Asthma    Malignant neoplasm of right female breast ([address]    Impaired glucose tolerance    Essential hypertension    Left foot pain    Obesity, Class III, BMI 40-49.9 (morbid obesity) ([address]    Chronic pain of left lower extremity    Right-sided chest wall pain    Right lateral epicondylitis       Past Surgical History:   Procedure Laterality Date    BREAST BIOPSY      COLONOSCOPY      polyp. Repeat in     COLONOSCOPY  2016    diverticulosis    ELBOW FRACTURE SURGERY Right 12/10/2019    RIGHT LATERAL EPICONDYLITIS TENDON DEBRIDEMENT, BOSWORTH PROCEDURE performed by Bruce Ghotra DO at [facility] HYSTERECTOMY      Cysts, Endometriosis.   1 ovary remains    TONSILLECTOMY AND ADENOIDECTOMY      TUBAL LIGATION         Family History   Problem Relation Age of Onset    Hypertension Father  Stroke Maternal Aunt     Diabetes Paternal Aunt     Diabetes Paternal Uncle     Stroke Maternal Grandmother     Depression Son     Asthma Son     Stroke Mother 79         in 2017   [place] Files Sister                No current outpatient medications on file prior to visit. Current Facility-Administered Medications on File Prior to Visit   Medication Dose Route Frequency Provider Last Rate Last Admin    promethazine (PHENERGAN) injection 25 mg  25 mg IntraMUSCular Q6H PRN Giancarlo Johnson MD   25 mg at 14 1541                   Objective:           Physical Exam  Vitals and nursing note reviewed. Constitutional:       General: She is not in acute distress. Appearance: She is well-developed. She is morbidly obese. HENT:      Head: Normocephalic and atraumatic. Cardiovascular:      Rate and Rhythm: Normal rate and regular rhythm. Heart sounds: Normal heart sounds, S1 normal and S2 normal.   Pulmonary:      Effort: Pulmonary effort is normal. No respiratory distress. Breath sounds: Normal breath sounds. No wheezing. Musculoskeletal:      Cervical back: Neck supple. Skin:     General: Skin is warm and dry. Neurological:      Mental Status: She is alert and oriented to person, place, and time. Comments: . Psychiatric:         Attention and Perception: She is attentive. Speech: Speech normal.         Behavior: Behavior normal.         Thought Content: Thought content normal.         Judgment: Judgment normal.       Vitals:    22 0841   BP: 126/80   Pulse: 63   Resp: 14   Temp: 97.2 °F (36.2 °C)   TempSrc: Temporal   SpO2: 97%   Weight: 270 lb 9.6 oz (122.7 kg)   Height: 5' 7.5\" (1.715 m)     Body mass index is 41.76 kg/m².      Wt Readings from Last 3 Encounters:   22 270 lb 9.6 oz (122.7 kg)   22 261 lb 3.2 oz (118.5 kg)   21 261 lb (118.4 kg)     BP Readings from Last 3 Encounters:   22 126/80   22 138/84   21 (!) 173/88          No results found for this visit on 07/12/22. Assessment:       Diagnosis Orders   1. Essential hypertension  metoprolol succinate (TOPROL XL) 100 MG extended release tablet   2. Mild intermittent asthma without complication  beclomethasone (QVAR REDIHALER) 80 MCG/ACT AERB inhaler    albuterol sulfate HFA (PROVENTIL;VENTOLIN;PROAIR) 108 (90 Base) MCG/ACT inhaler   3. Moodiness  amitriptyline (ELAVIL) 25 MG tablet   4. Headache disorder  amitriptyline (ELAVIL) 25 MG tablet   5. Psychophysiological insomnia  amitriptyline (ELAVIL) 25 MG tablet   6. Hyperlipidemia, unspecified hyperlipidemia type  atorvastatin (LIPITOR) 80 MG tablet    Lipid Panel   7. Screen for colon cancer  Amb External Referral To Gastroenterology   8. Flatulence     9. Weight gain     10. Screening mammogram for breast cancer  BECKY Screening Bilateral   11. History of breast cancer  BECKY Screening Bilateral   12. Grief             Plan:      Hypertension stable with metoprolol    Asthma stable with Qvar twice a day. Since the summer is when her asthma flares released, she can take her Qvar just once a day. Headache stable with amitriptyline    GI referral for the flatulence    Concerned about weight gain. She knows to work on her diet            Return in about 6 months (around 1/12/2023) for HTN, HA, Asthma.

## 2022-07-12 NOTE — PATIENT INSTRUCTIONS
BRING YOUR MEDICATION BOTTLES TO ALL APPOINTMENTS    Check MY CHART for test results. If you have forgotten your password, call [phone]. The diagnoses and medications listed in this after visit summary may not be up to date. Check MY CHART in 28-48 hours forcorrections. Late cancellation policy: So that we can better accommodate people who are sick, please give our office 24 hour notice for an appointment cancellation. Missed appointments: Your care is very important to us. It is important that you keep your scheduled appointments. Multiple missed appointments will lead to a dismissal from the office. Patients arriving late will be worked into the schedule as time permits, with patients arriving on time taken as scheduled. Late arriving patients are more than welcome to wait or reschedule their appointments. Please allow 5-7 business days for paperwork to be processed. Patient Education        Gas and Bloating: Care Instructions  Your Care Instructions     Gas and bloating can be uncomfortable and embarrassing problems. All people pass gas, but some people produce more gas than others, sometimes enough to cause distress. It is normal to pass gas from 6 to 20 times per day. Excess gasusually is not caused by a serious health problem. Gas and bloating usually are caused by something you eat or drink, includingsome food supplements and medicines. Gas and bloating are usually harmless and go away without treatment. However, changing your diet can help end the problem. Some over-the-counter medicinescan help prevent gas and relieve bloating. Follow-up care is a key part of your treatment and safety. Be sure to make and go to all appointments, and call your doctor if you are having problems. It's also a good idea to know your test results and keep alist of the medicines you take. How can you care for yourself at home?    Keep a food diary if you think a food gives you gas. Write down what you eat or drink. Also record when you get gas. If you notice that a food seems to cause your gas each time, avoid it and see if the gas goes away. Examples of foods that cause gas include:  ? Fried and fatty foods. ? Beans. ? Vegetables such as artichokes, asparagus, broccoli, brussels sprouts, cabbage, cauliflower, cucumbers, green peppers, onions, peas, radishes, and raw potatoes. ? Fruits such as apricots, bananas, melons, peaches, pears, prunes, and raw apples. ? Wheat and wheat bran.  Soak dry beans in water overnight, then dump the water and cook the soaked beans in new water. This can help prevent gas and bloating.  If you have problems with lactose, avoid dairy products such as milk and cheese.  Try not to swallow air. Do not drink through a straw, gulp your food, or chew gum.  Take an over-the-counter medicine. Read and follow all instructions on the label. ? Food enzymes, such as Beano, can be added to gas-producing foods to prevent gas. ? Antacids, such as Maalox Anti-Gas and Mylanta Gas, can relieve bloating by making you burp. Be careful when you take over-the-counter antacid medicines. Many of these medicines have aspirin in them. Read the label to make sure that you are not taking more than the recommended dose. Too much aspirin can be harmful. ? Activated charcoal tablets, such as CharcoCaps, may decrease odor from gas you pass. ? If you have problems with lactose, you can take medicines such as Dairy Ease and Lactaid with dairy products to prevent gas and bloating.  Get some exercise regularly. When should you call for help? Call 911 anytime you think you may need emergency care. For example, call if:     You have gas and signs of a heart attack, such as:  ? Chest pain or pressure. ? Sweating. ? Shortness of breath. ? Nausea or vomiting. ? Pain that spreads from the chest to the neck, jaw, or one or both shoulders or arms.   ? Dizziness or lightheadedness. ? A fast or uneven pulse. After calling 911, chew 1 adult-strength aspirin. Wait for an ambulance. Do not try to drive yourself. Call your doctor now or seek immediate medical care if:     You have severe belly pain.      You have blood in your stool. Watch closely for changes in your health, and be sure to contact your doctor if:     You have blood or pus in your urine.      Your urine is cloudy or smells bad.      You are burping and have trouble swallowing.      You feel bloated and have swelling in your belly.      You do not get better as expected. Where can you learn more? Go to [email or web address] and sign in to your Yattos account. Enter O564 in the AppLayer box to learn more about \"Gas and Bloating: Care Instructions. \"     If you do not have an account, please click on the \"Sign Up Now\" link. Current as of: March 9, 2022               Content Version: 13.3  © 2006-2022 Healthwise, Incorporated. Care instructions adapted under license by [facility] ([facility]). If you have questions about a medical condition or this instruction, always ask your healthcare professional. [place] [address] any warranty or liability for your use of this information.

## 2022-07-28 ENCOUNTER — HOSPITAL ENCOUNTER (OUTPATIENT)
Dept: MAMMOGRAPHY | Age: 52
Discharge: HOME | End: 2022-07-28
Payer: COMMERCIAL

## 2022-07-28 DIAGNOSIS — Z12.31 SCREENING MAMMOGRAM, ENCOUNTER FOR: ICD-10-CM

## 2022-07-28 DIAGNOSIS — Z12.31 SCREENING MAMMOGRAM FOR BREAST CANCER: ICD-10-CM

## 2022-07-28 DIAGNOSIS — Z85.3 HISTORY OF BREAST CANCER: ICD-10-CM

## 2022-07-28 PROCEDURE — 77067 SCR MAMMO BI INCL CAD: CPT

## 2022-08-11 ENCOUNTER — TELEMEDICINE (OUTPATIENT)
Dept: FAMILY MEDICINE CLINIC | Age: 52
End: 2022-08-11

## 2022-08-11 DIAGNOSIS — F51.04 PSYCHOPHYSIOLOGICAL INSOMNIA: Primary | ICD-10-CM

## 2022-08-11 DIAGNOSIS — R45.89 MOODINESS: ICD-10-CM

## 2022-08-11 PROCEDURE — 99213 OFFICE O/P EST LOW 20 MIN: CPT | Performed by: FAMILY MEDICINE

## 2022-08-11 ASSESSMENT — PATIENT HEALTH QUESTIONNAIRE - PHQ9
SUM OF ALL RESPONSES TO PHQ QUESTIONS 1-9: 7
SUM OF ALL RESPONSES TO PHQ9 QUESTIONS 1 & 2: 4
4. FEELING TIRED OR HAVING LITTLE ENERGY: 2
1. LITTLE INTEREST OR PLEASURE IN DOING THINGS: 2
7. TROUBLE CONCENTRATING ON THINGS, SUCH AS READING THE NEWSPAPER OR WATCHING TELEVISION: 0
SUM OF ALL RESPONSES TO PHQ QUESTIONS 1-9: 7
2. FEELING DOWN, DEPRESSED OR HOPELESS: 2
8. MOVING OR SPEAKING SO SLOWLY THAT OTHER PEOPLE COULD HAVE NOTICED. OR THE OPPOSITE, BEING SO FIGETY OR RESTLESS THAT YOU HAVE BEEN MOVING AROUND A LOT MORE THAN USUAL: 0
5. POOR APPETITE OR OVEREATING: 1
10. IF YOU CHECKED OFF ANY PROBLEMS, HOW DIFFICULT HAVE THESE PROBLEMS MADE IT FOR YOU TO DO YOUR WORK, TAKE CARE OF THINGS AT HOME, OR GET ALONG WITH OTHER PEOPLE: 1
6. FEELING BAD ABOUT YOURSELF - OR THAT YOU ARE A FAILURE OR HAVE LET YOURSELF OR YOUR FAMILY DOWN: 0
3. TROUBLE FALLING OR STAYING ASLEEP: 0

## 2022-08-11 NOTE — PROGRESS NOTES
2022    TELEHEALTH EVALUATION -- Audio/Visual (During WYT-03 public health emergency)    HPI:    Mauricio Saint (:  1970) has requested an audio/video evaluation for the following concern(s):    Chief Complaint   Patient presents with    Depression       PHQ Scores 2022 2022 2021 3/10/2020 3/11/2019 2018 2016   PHQ2 Score 4 4 0 1 6 0 0   PHQ9 Score 7 12 0 1 20 0 0     Interpretation of Total Score Depression Severity: 1-4 = Minimal depression, 5-9 = Mild depression, 10-14 = Moderate depression, 15-19 = Moderately severe depression, 20-27 = Severe depression       Grief:   several months ago. Father  4 months ago. sHe is doing okay right now. However things are hard. Employment problem:  but doing better. Unemployed but is looking for other employment. has been able to relax and is now utilizing a . Insomnia: The amitriptyline helps a lot    Review of Systems    Prior to Visit Medications    Medication Sig Taking?  Authorizing Provider   beclomethasone (QVAR REDIHALER) 80 MCG/ACT AERB inhaler Inhale 2 puffs into the lungs 2 times daily Yes Carlitos Lal MD   amitriptyline (ELAVIL) 25 MG tablet Take 1 tablet by mouth nightly Yes Carlitos Lal MD   metoprolol succinate (TOPROL XL) 100 MG extended release tablet Take 1 tablet by mouth daily Yes Carlitos Lal MD   atorvastatin (LIPITOR) 80 MG tablet Take 1 tablet by mouth daily Yes Carlitos Lal MD   albuterol sulfate HFA (PROVENTIL;VENTOLIN;PROAIR) 108 (90 Base) MCG/ACT inhaler Inhale 2 puffs into the lungs every 4 hours as needed for Shortness of Breath Yes Carlitos Lal MD   SUMAtriptan (IMITREX) 100 MG tablet Take 1 tablet by mouth 2 times daily as needed for Migraine  Patient not taking: Reported on 2022  Carlitos Lal MD   SUMAtriptan (IMITREX) 6 MG/0.5ML SOLN injection Inject 0.5 mLs into the skin daily as needed for Migraine Please dispense as [address] [phone]  Patient not taking: Reported on 8/11/2022  Mariette Oppenheim, MD       Social History     Tobacco Use    Smoking status: Never    Smokeless tobacco: Never   Vaping Use    Vaping Use: Never used   Substance Use Topics    Alcohol use: Yes     Comment: occasionally    Drug use: No        Allergies   Allergen Reactions    Aloe Vera Rash   ,   Past Medical History:   Diagnosis Date    Arthritis     in right elbow    Asthma     last exacerbation 2014    Breast cancer ([address]     Colon polyp 2010    Depression     Diverticulosis     colonoscopy 2016    Endometriosis     History of breast cancer 2009    lumpectomy r side chemo and radiation     HTN (hypertension) 09/18/2013    Hyperlipidemia 10/2012    Migraines 11/15/2019    LAST MIGRAINE 11/15/19    Ovarian cyst     Pre-diabetes     no meds    Seasonal allergies     Uterine fibroid        PHYSICAL EXAMINATION:  [ INSTRUCTIONS:  \"[x]\" Indicates a positive item  \"[]\" Indicates a negative item  -- DELETE ALL ITEMS NOT EXAMINED]  Vital Signs: (As obtained by patient/caregiver or practitioner observation)    Blood pressure-  Heart rate-    Respiratory rate-    Temperature-  Pulse oximetry-     Constitutional: [x] Appears well-developed and well-nourished [x] No apparent distress      [] Abnormal-   Mental status  [x] Alert and awake  [x] Oriented to person/place/time []Able to follow commands      Eyes:  EOM    [x]  Normal  [] Abnormal-  Sclera  []  Normal  [] Abnormal -         Discharge []  None visible  [] Abnormal -    HENT:   [x] Normocephalic, atraumatic.   [] Abnormal   [] Mouth/Throat: Mucous membranes are moist.     External Ears [x] Normal  [] Abnormal-     Neck: [x] No visualized mass     Pulmonary/Chest: [x] Respiratory effort normal.  [x] No visualized signs of difficulty breathing or respiratory distress        [] Abnormal-      Musculoskeletal:   [x] Normal gait with no signs of ataxia         [] Normal range of motion of neck        [] Abnormal- Neurological:        [x] No Facial Asymmetry (Cranial nerve 7 motor function) (limited exam to video visit)          [] No gaze palsy        [] Abnormal-         Skin:        [x] No significant exanthematous lesions or discoloration noted on facial skin         [] Abnormal-            Psychiatric:       [x] Normal Affect [x] No Hallucinations        [] Abnormal-     Other pertinent observable physical exam findings-     ASSESSMENT/PLAN:  There are no diagnoses linked to this encounter. Diagnosis Orders   1. Psychophysiological insomnia        2. Moodiness            Doing well. Continue the amitriptyline and recheck in January. Return if symptoms worsen or fail to improve. Elena Johnson, was evaluated through a synchronous (real-time) audio-video encounter. The patient (or guardian if applicable) is aware that this is a billable service, which includes applicable co-pays. This Virtual Visit was conducted with patient's (and/or legal guardian's) consent. The visit was conducted pursuant to the emergency declaration under the [address] [place] [address] authority and the [employer] and [employer] General Act. Patient identification was verified, and a caregiver was present when appropriate. The patient was located at Home: [address] [place] [id number]. Provider was located at Home ([address]: [place]. Total time spent on this encounter: Not billed by time    --Pao Hopson MD on 8/11/2022 at 11:57 AM    An electronic signature was used to authenticate this note.

## 2022-11-01 ENCOUNTER — COMMUNITY OUTREACH (OUTPATIENT)
Dept: FAMILY MEDICINE CLINIC | Age: 52
End: 2022-11-01

## 2022-12-12 ENCOUNTER — TELEMEDICINE (OUTPATIENT)
Dept: FAMILY MEDICINE CLINIC | Age: 52
End: 2022-12-12
Payer: COMMERCIAL

## 2022-12-12 DIAGNOSIS — U07.1 COVID-19 VIRUS INFECTION: Primary | ICD-10-CM

## 2022-12-12 DIAGNOSIS — R05.1 ACUTE COUGH: ICD-10-CM

## 2022-12-12 PROCEDURE — 99213 OFFICE O/P EST LOW 20 MIN: CPT | Performed by: FAMILY MEDICINE

## 2022-12-12 RX ORDER — GUAIFENESIN AND CODEINE PHOSPHATE 100; 10 MG/5ML; MG/5ML
5 SOLUTION ORAL EVERY 4 HOURS PRN
COMMUNITY
Start: 2022-12-06

## 2022-12-12 RX ORDER — ONDANSETRON 4 MG/1
TABLET, ORALLY DISINTEGRATING ORAL
COMMUNITY
Start: 2022-12-07

## 2022-12-12 NOTE — PROGRESS NOTES
2022    TELEHEALTH EVALUATION -- Audio/Visual (During JKPXI-52 public health emergency)    HPI:    Ciara Edmonds (:  1970) has requested an audio/video evaluation for the following concern(s):    Covid: dx last week through [facility] ER on Dec 6. Cough is problematic. Cough medication (Robitussin) helps. Also took Zofran, and Paxlovid. No unusual SOB. Has been taking her QVAR once per day for her asthma. Is not up to date on her covid vaccines. Review of Systems    Prior to Visit Medications    Medication Sig Taking? Authorizing Provider   ondansetron (ZOFRAN-ODT) 4 MG disintegrating tablet DISSOLVE 1 TABLET IN MOUTH EVERY EIGHT HOURS AS NEEDED for nausea for up to 5 days Yes Historical Provider, MD   guaiFENesin-codeine (TUSSI-ORGANIDIN NR) 100-10 MG/5ML syrup Take 5 mLs by mouth every 4 hours as needed.  Yes Historical Provider, MD   beclomethasone (QVAR REDIHALER) 80 MCG/ACT AERB inhaler Inhale 2 puffs into the lungs 2 times daily Yes Wing Sarkis MD   amitriptyline (ELAVIL) 25 MG tablet Take 1 tablet by mouth nightly Yes Wing Sarkis MD   metoprolol succinate (TOPROL XL) 100 MG extended release tablet Take 1 tablet by mouth daily Yes Wing Sarkis MD   atorvastatin (LIPITOR) 80 MG tablet Take 1 tablet by mouth daily Yes Wing Sarkis MD   albuterol sulfate HFA (PROVENTIL;VENTOLIN;PROAIR) 108 (90 Base) MCG/ACT inhaler Inhale 2 puffs into the lungs every 4 hours as needed for Shortness of Breath Yes Wing Sarkis MD   SUMAtriptan (IMITREX) 100 MG tablet Take 1 tablet by mouth 2 times daily as needed for Migraine Yes Wing Sarkis MD   SUMAtriptan (IMITREX) 6 MG/0.5ML SOLN injection Inject 0.5 mLs into the skin daily as needed for Migraine Please dispense as [facility] [phone] Yes Wing Sarkis MD       Social History     Tobacco Use    Smoking status: Never    Smokeless tobacco: Never   Vaping Use    Vaping Use: Never used   Substance Use Topics    Alcohol use: Yes     Comment: occasionally    Drug use: No        Allergies   Allergen Reactions    Aloe Vera Rash   ,   Past Medical History:   Diagnosis Date    Arthritis     in right elbow    Asthma     last exacerbation 2014    Breast cancer ([address]     Colon polyp 2010    Depression     Diverticulosis     colonoscopy 2016    Endometriosis     History of breast cancer 2009    lumpectomy r side chemo and radiation     HTN (hypertension) 09/18/2013    Hyperlipidemia 10/2012    Migraines 11/15/2019    LAST MIGRAINE 11/15/19    Ovarian cyst     Pre-diabetes     no meds    Seasonal allergies     Uterine fibroid        PHYSICAL EXAMINATION:  [ INSTRUCTIONS:  \"[x]\" Indicates a positive item  \"[]\" Indicates a negative item  -- DELETE ALL ITEMS NOT EXAMINED]  Vital Signs: (As obtained by patient/caregiver or practitioner observation)    Blood pressure-  Heart rate-    Respiratory rate-    Temperature-  Pulse oximetry-     Constitutional: [] Appears well-developed and well-nourished [] No apparent distress      [x] Abnormal- tired apppearing  Mental status  [x] Alert and awake  [x] Oriented to person/place/time [x]Able to follow commands      Eyes:  EOM    []  Normal  [] Abnormal-  Sclera  []  Normal  [] Abnormal -         Discharge []  None visible  [] Abnormal -    HENT:   [x] Normocephalic, atraumatic.   [] Abnormal   [] Mouth/Throat: Mucous membranes are moist.     External Ears [x] Normal  [] Abnormal-     Neck: [x] No visualized mass     Pulmonary/Chest: [x] Respiratory effort normal.  [x] No visualized signs of difficulty breathing or respiratory distress        [] Abnormal-      Musculoskeletal:   [x] Normal gait with no signs of ataxia         [] Normal range of motion of neck        [] Abnormal-       Neurological:        [] No Facial Asymmetry (Cranial nerve 7 motor function) (limited exam to video visit)          [] No gaze palsy        [] Abnormal-         Skin:        [x] No significant exanthematous lesions or discoloration noted on facial skin         [] Abnormal-            Psychiatric:       [x] Normal Affect [x] No Hallucinations        [] Abnormal-     Other pertinent observable physical exam findings-      Diagnosis Orders   1. COVID-19 virus infection        2. Acute cough            Off work rest of the week. Take the qvar twice per day      Return if symptoms worsen or fail to improve. Karl Mazariegos, was evaluated through a synchronous (real-time) audio-video encounter. The patient (or guardian if applicable) is aware that this is a billable service, which includes applicable co-pays. This Virtual Visit was conducted with patient's (and/or legal guardian's) consent. The visit was conducted pursuant to the emergency declaration under the [address] [place] [address] and the [employer] and [employer] General Act. Patient identification was verified, and a caregiver was present when appropriate. The patient was located at Home: [address] [place] [address]. Provider was located at Home ([address]: [place]. Total time spent on this encounter: Not billed by time    --Laree Cabot, MD on 12/12/2022 at 4:34 PM    An electronic signature was used to authenticate this note.

## 2023-03-27 ENCOUNTER — OFFICE VISIT (OUTPATIENT)
Dept: FAMILY MEDICINE CLINIC | Age: 53
End: 2023-03-27

## 2023-03-27 VITALS
RESPIRATION RATE: 14 BRPM | OXYGEN SATURATION: 97 % | SYSTOLIC BLOOD PRESSURE: 116 MMHG | DIASTOLIC BLOOD PRESSURE: 72 MMHG | HEART RATE: 68 BPM | HEIGHT: 68 IN | TEMPERATURE: 98.4 F | WEIGHT: 262 LBS | BODY MASS INDEX: 39.71 KG/M2

## 2023-03-27 DIAGNOSIS — R07.89 CHEST TIGHTNESS: Primary | ICD-10-CM

## 2023-03-27 PROCEDURE — 99215 OFFICE O/P EST HI 40 MIN: CPT | Performed by: FAMILY MEDICINE

## 2023-03-27 PROCEDURE — 3074F SYST BP LT 130 MM HG: CPT | Performed by: FAMILY MEDICINE

## 2023-03-27 PROCEDURE — 3078F DIAST BP <80 MM HG: CPT | Performed by: FAMILY MEDICINE

## 2023-03-27 SDOH — ECONOMIC STABILITY: FOOD INSECURITY: WITHIN THE PAST 12 MONTHS, THE FOOD YOU BOUGHT JUST DIDN'T LAST AND YOU DIDN'T HAVE MONEY TO GET MORE.: PATIENT DECLINED

## 2023-03-27 SDOH — ECONOMIC STABILITY: HOUSING INSECURITY
IN THE LAST 12 MONTHS, WAS THERE A TIME WHEN YOU DID NOT HAVE A STEADY PLACE TO SLEEP OR SLEPT IN A SHELTER (INCLUDING NOW)?: NO

## 2023-03-27 SDOH — ECONOMIC STABILITY: TRANSPORTATION INSECURITY
IN THE PAST 12 MONTHS, HAS LACK OF TRANSPORTATION KEPT YOU FROM MEETINGS, WORK, OR FROM GETTING THINGS NEEDED FOR DAILY LIVING?: PATIENT DECLINED

## 2023-03-27 SDOH — ECONOMIC STABILITY: FOOD INSECURITY: WITHIN THE PAST 12 MONTHS, YOU WORRIED THAT YOUR FOOD WOULD RUN OUT BEFORE YOU GOT MONEY TO BUY MORE.: PATIENT DECLINED

## 2023-03-27 SDOH — ECONOMIC STABILITY: INCOME INSECURITY: HOW HARD IS IT FOR YOU TO PAY FOR THE VERY BASICS LIKE FOOD, HOUSING, MEDICAL CARE, AND HEATING?: NOT VERY HARD

## 2023-03-27 ASSESSMENT — PATIENT HEALTH QUESTIONNAIRE - PHQ9
1. LITTLE INTEREST OR PLEASURE IN DOING THINGS: 0
SUM OF ALL RESPONSES TO PHQ QUESTIONS 1-9: 0
2. FEELING DOWN, DEPRESSED OR HOPELESS: 0
SUM OF ALL RESPONSES TO PHQ QUESTIONS 1-9: 0
SUM OF ALL RESPONSES TO PHQ QUESTIONS 1-9: 0
SUM OF ALL RESPONSES TO PHQ9 QUESTIONS 1 & 2: 0
SUM OF ALL RESPONSES TO PHQ QUESTIONS 1-9: 0

## 2023-03-27 NOTE — PROGRESS NOTES
mg/dL      Total Cholesterol: 241 mg/dL  Lab Review not applicable        Assessment:       Diagnosis Orders   1. Chest tightness                Plan:      Squad called    Chew aspirin, Nitroglycerin, 2 L of oxygen. Patient remained stable while she was in the office      No follow-ups on file.

## 2023-04-24 ENCOUNTER — OFFICE VISIT (OUTPATIENT)
Dept: FAMILY MEDICINE CLINIC | Age: 53
End: 2023-04-24
Payer: COMMERCIAL

## 2023-04-24 VITALS
SYSTOLIC BLOOD PRESSURE: 161 MMHG | BODY MASS INDEX: 40.65 KG/M2 | OXYGEN SATURATION: 100 % | HEART RATE: 60 BPM | DIASTOLIC BLOOD PRESSURE: 83 MMHG | WEIGHT: 263.4 LBS

## 2023-04-24 DIAGNOSIS — Z91.09 SENSITIVITY TO SUNLIGHT: ICD-10-CM

## 2023-04-24 DIAGNOSIS — E78.5 HYPERLIPIDEMIA, UNSPECIFIED HYPERLIPIDEMIA TYPE: ICD-10-CM

## 2023-04-24 DIAGNOSIS — Z12.11 SCREEN FOR COLON CANCER: ICD-10-CM

## 2023-04-24 DIAGNOSIS — J45.20 MILD INTERMITTENT ASTHMA WITHOUT COMPLICATION: ICD-10-CM

## 2023-04-24 DIAGNOSIS — I10 ESSENTIAL HYPERTENSION: Primary | ICD-10-CM

## 2023-04-24 DIAGNOSIS — Z12.31 ENCOUNTER FOR SCREENING MAMMOGRAM FOR MALIGNANT NEOPLASM OF BREAST: ICD-10-CM

## 2023-04-24 LAB
CREAT UR-MCNC: 161.7 MG/DL (ref 28–259)
MICROALBUMIN UR DL<=1MG/L-MCNC: <1.2 MG/DL
MICROALBUMIN/CREAT UR: NORMAL MG/G (ref 0–30)

## 2023-04-24 PROCEDURE — 3077F SYST BP >= 140 MM HG: CPT | Performed by: FAMILY MEDICINE

## 2023-04-24 PROCEDURE — 99214 OFFICE O/P EST MOD 30 MIN: CPT | Performed by: FAMILY MEDICINE

## 2023-04-24 PROCEDURE — 3079F DIAST BP 80-89 MM HG: CPT | Performed by: FAMILY MEDICINE

## 2023-04-24 RX ORDER — ATORVASTATIN CALCIUM 80 MG/1
80 TABLET, FILM COATED ORAL DAILY
Qty: 90 TABLET | Refills: 3 | Status: SHIPPED | OUTPATIENT
Start: 2023-04-24

## 2023-04-24 RX ORDER — METOPROLOL SUCCINATE 50 MG/1
50 TABLET, EXTENDED RELEASE ORAL DAILY
Qty: 30 TABLET | Refills: 0 | Status: SHIPPED | OUTPATIENT
Start: 2023-04-24

## 2023-04-24 RX ORDER — ALBUTEROL SULFATE 90 UG/1
2 AEROSOL, METERED RESPIRATORY (INHALATION) EVERY 4 HOURS PRN
Qty: 18 G | Refills: 1 | Status: SHIPPED | OUTPATIENT
Start: 2023-04-24

## 2023-04-24 NOTE — PROGRESS NOTES
Patient ID: Lorenzo Banda 1970    . Chief Complaint   Patient presents with    Asthma    Hypertension    Hyperlipidemia    Blood Sugar Problem    Medication Problem     Not taking it, b/p medication,  not as stress like she was, if she needs to take it wants the dose to be lower not as high as the dose is now          HPI    Hypertension: Has not been taking her hypertension medications. No chest pain or pressure. Feels fine. Not as stressed as before    Hyperlipidemia: Ongoing for a long time. Has not been taking her medication    Asthma: Stable has been taking her Qvar. Takes her rescue inhaler rarely. Last took it about 6 weeks ago when we called the squad for her in this office. Rash: worse on right forearm when gets exposed to sunlight. Has been trying to cover it up. Sees dermatologist in 2 weeks      Migraines: has not needed Imitrex lately  Patient Active Problem List   Diagnosis    History of breast cancer    Hyperlipidemia    Headache    Asthma    Malignant neoplasm of right female breast ([address]    Impaired glucose tolerance    Essential hypertension    Left foot pain    Obesity, Class III, BMI 40-49.9 (morbid obesity) (HCC)    Chronic pain of left lower extremity    Right-sided chest wall pain    Right lateral epicondylitis       Past Surgical History:   Procedure Laterality Date    BREAST BIOPSY  2009    COLONOSCOPY  2010    polyp. Repeat in 2015    COLONOSCOPY  12/12/2016    diverticulosis    ELBOW FRACTURE SURGERY Right 12/10/2019    RIGHT LATERAL EPICONDYLITIS TENDON DEBRIDEMENT, BOSWORTH PROCEDURE performed by Renae Pedro DO at [facility] (CERVIX STATUS UNKNOWN)      Cysts, Endometriosis.   1 ovary remains    TONSILLECTOMY AND ADENOIDECTOMY  1987    TUBAL LIGATION  1993       Family History   Problem Relation Age of Onset    Hypertension Father     Stroke Maternal Aunt     Diabetes Paternal Aunt     Diabetes Paternal Uncle     Stroke Maternal Grandmother

## 2023-04-26 RX ORDER — BISACODYL 5 MG/1
TABLET, DELAYED RELEASE ORAL
Qty: 4 TABLET | Refills: 0 | Status: SHIPPED | OUTPATIENT
Start: 2023-04-26

## 2023-04-26 RX ORDER — POLYETHYLENE GLYCOL 3350 17 G/17G
POWDER, FOR SOLUTION ORAL
Qty: 238 G | Refills: 0 | Status: SHIPPED | OUTPATIENT
Start: 2023-04-26

## 2023-04-28 ENCOUNTER — TELEPHONE (OUTPATIENT)
Dept: GASTROENTEROLOGY | Age: 53
End: 2023-04-28

## 2023-05-01 ENCOUNTER — PREP FOR PROCEDURE (OUTPATIENT)
Dept: GASTROENTEROLOGY | Age: 53
End: 2023-05-01

## 2023-05-01 RX ORDER — SODIUM CHLORIDE 9 MG/ML
25 INJECTION, SOLUTION INTRAVENOUS PRN
Status: CANCELLED | OUTPATIENT
Start: 2023-05-01

## 2023-05-01 RX ORDER — SODIUM CHLORIDE 0.9 % (FLUSH) 0.9 %
5-40 SYRINGE (ML) INJECTION PRN
Status: CANCELLED | OUTPATIENT
Start: 2023-05-01

## 2023-05-01 RX ORDER — SODIUM CHLORIDE 0.9 % (FLUSH) 0.9 %
5-40 SYRINGE (ML) INJECTION EVERY 12 HOURS SCHEDULED
Status: CANCELLED | OUTPATIENT
Start: 2023-05-01

## 2023-05-01 RX ORDER — SODIUM CHLORIDE, SODIUM LACTATE, POTASSIUM CHLORIDE, CALCIUM CHLORIDE 600; 310; 30; 20 MG/100ML; MG/100ML; MG/100ML; MG/100ML
INJECTION, SOLUTION INTRAVENOUS CONTINUOUS
Status: CANCELLED | OUTPATIENT
Start: 2023-05-01

## 2023-05-08 ENCOUNTER — OFFICE VISIT (OUTPATIENT)
Dept: FAMILY MEDICINE CLINIC | Age: 53
End: 2023-05-08
Payer: COMMERCIAL

## 2023-05-08 VITALS
SYSTOLIC BLOOD PRESSURE: 142 MMHG | BODY MASS INDEX: 40.55 KG/M2 | OXYGEN SATURATION: 98 % | WEIGHT: 262.8 LBS | DIASTOLIC BLOOD PRESSURE: 100 MMHG | HEART RATE: 57 BPM

## 2023-05-08 DIAGNOSIS — I10 ESSENTIAL HYPERTENSION: Primary | ICD-10-CM

## 2023-05-08 PROCEDURE — 3074F SYST BP LT 130 MM HG: CPT | Performed by: FAMILY MEDICINE

## 2023-05-08 PROCEDURE — 3078F DIAST BP <80 MM HG: CPT | Performed by: FAMILY MEDICINE

## 2023-05-08 PROCEDURE — 99213 OFFICE O/P EST LOW 20 MIN: CPT | Performed by: FAMILY MEDICINE

## 2023-05-08 RX ORDER — METOPROLOL SUCCINATE 100 MG/1
100 TABLET, EXTENDED RELEASE ORAL DAILY
Qty: 30 TABLET | Refills: 0 | Status: SHIPPED | OUTPATIENT
Start: 2023-05-08

## 2023-05-08 NOTE — PROGRESS NOTES
Patient ID: Tono Lechuga 1970    . Chief Complaint   Patient presents with    Hypertension         Hypertension  This is a chronic problem. The problem is uncontrolled. Risk factors for coronary artery disease include obesity and post-menopausal state. Past treatments include beta blockers. The current treatment provides mild improvement. Patient Active Problem List   Diagnosis    History of breast cancer    Hyperlipidemia    Headache    Asthma    Malignant neoplasm of right female breast ([address]    Impaired glucose tolerance    Essential hypertension    Left foot pain    Obesity, Class III, BMI 40-49.9 (morbid obesity) (HCC)    Chronic pain of left lower extremity    Right-sided chest wall pain    Right lateral epicondylitis       Past Surgical History:   Procedure Laterality Date    BREAST BIOPSY      COLONOSCOPY      polyp. Repeat in     COLONOSCOPY  2016    diverticulosis    ELBOW FRACTURE SURGERY Right 12/10/2019    RIGHT LATERAL EPICONDYLITIS TENDON DEBRIDEMENT, BOSWORTH PROCEDURE performed by Annette Corley DO at [facility] (CERVIX STATUS UNKNOWN)      Cysts, Endometriosis. 1 ovary remains    TONSILLECTOMY AND ADENOIDECTOMY      TUBAL LIGATION         Family History   Problem Relation Age of Onset    Hypertension Father     Stroke Maternal Aunt     Diabetes Paternal Aunt     Diabetes Paternal Uncle     Stroke Maternal Grandmother     Depression Son     Asthma Son     Stroke Mother 79         in 2017    Lung Cancer Sister                Current Outpatient Medications on File Prior to Visit   Medication Sig Dispense Refill    atorvastatin (LIPITOR) 80 MG tablet Take 1 tablet by mouth daily 90 tablet 3    beclomethasone (QVAR REDIHALER) 80 MCG/ACT AERB inhaler Inhale 2 puffs into the lungs in the morning and 2 puffs in the evening.  1 each 3    albuterol sulfate HFA (PROVENTIL;VENTOLIN;PROAIR) 108 (90 Base) MCG/ACT inhaler Inhale 2 puffs into the

## 2023-05-22 ENCOUNTER — TELEPHONE (OUTPATIENT)
Dept: FAMILY MEDICINE CLINIC | Age: 53
End: 2023-05-22

## 2023-05-22 NOTE — TELEPHONE ENCOUNTER
----- Message from [address] Natasha Hugginss sent at 5/22/2023  1:14 PM EDT -----  Regarding: Migraines   Contact: [phone]  So Saturday I had an episode and I dont know what triggered it. But, my normal feeling of how I know Im having a migraine headache is sharp pains on both sides of my front temples. Shooting pains. This time it was different, it was consistent pounding  and tightness behind my right ear and it floated from the front right(side) of my head to my neck. It was painful made me dizzy nauseous and blurry vision. I used one of my shots and it helped with the pain but then of course I was then drowsy and had to sleep it off. Should I be concerned that my sign of having a migraine has shifted? Or does this sound like something else?

## 2023-05-23 ENCOUNTER — TELEPHONE (OUTPATIENT)
Dept: GASTROENTEROLOGY | Age: 53
End: 2023-05-23

## 2023-05-31 ENCOUNTER — TELEPHONE (OUTPATIENT)
Dept: GASTROENTEROLOGY | Age: 53
End: 2023-05-31

## 2023-06-07 ENCOUNTER — OFFICE VISIT (OUTPATIENT)
Dept: FAMILY MEDICINE CLINIC | Age: 53
End: 2023-06-07
Payer: COMMERCIAL

## 2023-06-07 VITALS
HEART RATE: 70 BPM | SYSTOLIC BLOOD PRESSURE: 144 MMHG | OXYGEN SATURATION: 98 % | WEIGHT: 263.8 LBS | BODY MASS INDEX: 40.71 KG/M2 | DIASTOLIC BLOOD PRESSURE: 78 MMHG

## 2023-06-07 DIAGNOSIS — R00.2 HEART PALPITATIONS: ICD-10-CM

## 2023-06-07 DIAGNOSIS — G44.209 TENSION HEADACHE: ICD-10-CM

## 2023-06-07 DIAGNOSIS — I10 ESSENTIAL HYPERTENSION: Primary | ICD-10-CM

## 2023-06-07 DIAGNOSIS — E66.01 OBESITY, CLASS III, BMI 40-49.9 (MORBID OBESITY) (HCC): ICD-10-CM

## 2023-06-07 LAB
BASOPHILS # BLD: 0.1 K/UL (ref 0–0.2)
BASOPHILS NFR BLD: 1.2 %
DEPRECATED RDW RBC AUTO: 13.7 % (ref 12.4–15.4)
EOSINOPHIL # BLD: 0.1 K/UL (ref 0–0.6)
EOSINOPHIL NFR BLD: 1.5 %
HCT VFR BLD AUTO: 40.1 % (ref 36–48)
HGB BLD-MCNC: 13.5 G/DL (ref 12–16)
LYMPHOCYTES # BLD: 2.3 K/UL (ref 1–5.1)
LYMPHOCYTES NFR BLD: 33.7 %
MCH RBC QN AUTO: 29.9 PG (ref 26–34)
MCHC RBC AUTO-ENTMCNC: 33.7 G/DL (ref 31–36)
MCV RBC AUTO: 88.8 FL (ref 80–100)
MONOCYTES # BLD: 0.5 K/UL (ref 0–1.3)
MONOCYTES NFR BLD: 6.7 %
NEUTROPHILS # BLD: 3.9 K/UL (ref 1.7–7.7)
NEUTROPHILS NFR BLD: 56.9 %
PLATELET # BLD AUTO: 204 K/UL (ref 135–450)
PLATELET BLD QL SMEAR: ADEQUATE
PMV BLD AUTO: 9.6 FL (ref 5–10.5)
RBC # BLD AUTO: 4.52 M/UL (ref 4–5.2)
SLIDE REVIEW: NORMAL
WBC # BLD AUTO: 6.9 K/UL (ref 4–11)

## 2023-06-07 PROCEDURE — 99214 OFFICE O/P EST MOD 30 MIN: CPT | Performed by: FAMILY MEDICINE

## 2023-06-07 PROCEDURE — 3077F SYST BP >= 140 MM HG: CPT | Performed by: FAMILY MEDICINE

## 2023-06-07 PROCEDURE — 3078F DIAST BP <80 MM HG: CPT | Performed by: FAMILY MEDICINE

## 2023-06-07 PROCEDURE — 93000 ELECTROCARDIOGRAM COMPLETE: CPT | Performed by: FAMILY MEDICINE

## 2023-06-07 PROCEDURE — 36415 COLL VENOUS BLD VENIPUNCTURE: CPT | Performed by: FAMILY MEDICINE

## 2023-06-07 RX ORDER — CHLORTHALIDONE 25 MG/1
25 TABLET ORAL DAILY
Qty: 90 TABLET | Refills: 0 | Status: SHIPPED | OUTPATIENT
Start: 2023-06-07

## 2023-06-07 NOTE — PROGRESS NOTES
TSH with Reflex    CBC with Auto Differential    Lipid Panel    Holter Monitor 24 Hour      3. Tension headache        4. Obesity, Class III, BMI 40-49.9 (morbid obesity) ([facility])                Plan:      Massage, cold pack to neck/ shoulder    BP not controlled. Adding chlorthalidone    1800 calorie diet.   Eat through the day, not all at one time      Return in about 12 weeks (around 8/30/2023) for HTN, HA.

## 2023-06-07 NOTE — PATIENT INSTRUCTIONS
BRING YOUR MEDICATION BOTTLES TO ALL APPOINTMENTS    Check MY CHART for test results. If you have forgotten your password, call [phone]. The diagnoses and medications listed in this after visit summary may not be up to date. Check MY CHART in 28-48 hours forcorrections. Late cancellation policy: So that we can better accommodate people who are sick, please give our office 24 hour notice for an appointment cancellation. Missed appointments: Your care is very important to us. It is important that you keep your scheduled appointments. Multiple missed appointments will lead to a dismissal from the office. Patients arriving late will be worked into the schedule as time permits, with patients arriving on time taken as scheduled. Late arriving patients are more than welcome to wait or reschedule their appointments. Please allow 5-7 business days for paperwork to be processed.

## 2023-06-08 LAB
ALBUMIN SERPL-MCNC: 4.6 G/DL (ref 3.4–5)
ALBUMIN/GLOB SERPL: 1.7 {RATIO} (ref 1.1–2.2)
ALP SERPL-CCNC: 91 U/L (ref 40–129)
ALT SERPL-CCNC: 24 U/L (ref 10–40)
ANION GAP SERPL CALCULATED.3IONS-SCNC: 12 MMOL/L (ref 3–16)
AST SERPL-CCNC: 17 U/L (ref 15–37)
BILIRUB SERPL-MCNC: 1.3 MG/DL (ref 0–1)
BUN SERPL-MCNC: 10 MG/DL (ref 7–20)
CALCIUM SERPL-MCNC: 9.8 MG/DL (ref 8.3–10.6)
CHLORIDE SERPL-SCNC: 100 MMOL/L (ref 99–110)
CHOLEST SERPL-MCNC: 185 MG/DL (ref 0–199)
CO2 SERPL-SCNC: 30 MMOL/L (ref 21–32)
CREAT SERPL-MCNC: 0.7 MG/DL (ref 0.6–1.1)
GFR SERPLBLD CREATININE-BSD FMLA CKD-EPI: >60 ML/MIN/{1.73_M2}
GLUCOSE SERPL-MCNC: 65 MG/DL (ref 70–99)
HDLC SERPL-MCNC: 61 MG/DL (ref 40–60)
LDLC SERPL CALC-MCNC: 104 MG/DL
POTASSIUM SERPL-SCNC: 4.2 MMOL/L (ref 3.5–5.1)
PROT SERPL-MCNC: 7.3 G/DL (ref 6.4–8.2)
SODIUM SERPL-SCNC: 142 MMOL/L (ref 136–145)
TRIGL SERPL-MCNC: 99 MG/DL (ref 0–150)
TSH SERPL DL<=0.005 MIU/L-ACNC: 0.86 UIU/ML (ref 0.27–4.2)
VLDLC SERPL CALC-MCNC: 20 MG/DL

## 2023-06-22 ENCOUNTER — TELEPHONE (OUTPATIENT)
Dept: FAMILY MEDICINE CLINIC | Age: 53
End: 2023-06-22

## 2023-06-22 NOTE — TELEPHONE ENCOUNTER
----- Message from [phone] sent at 6/22/2023  8:18 AM EDT -----  Subject: Message to Provider    QUESTIONS  Information for Provider? The patient called today to see where she needs   to go to get her heart monitor? The patient states the PCP wants her to   wear the heart monitor then follow up in the office afterwards. Please   call the patient back to advise.   ---------------------------------------------------------------------------  --------------  Editorially INFO  [phone]; OK to leave message on voicemail  ---------------------------------------------------------------------------  --------------  SCRIPT ANSWERS  Relationship to Patient?  Self

## 2023-07-12 ENCOUNTER — TELEPHONE (OUTPATIENT)
Dept: GASTROENTEROLOGY | Age: 53
End: 2023-07-12

## 2023-07-14 ENCOUNTER — TELEPHONE (OUTPATIENT)
Dept: FAMILY MEDICINE CLINIC | Age: 53
End: 2023-07-14

## 2023-07-17 NOTE — TELEPHONE ENCOUNTER
LM for patient to return call I will also send mychart to let patient know Dr. Fina Carpio would like for her to make an appt

## 2023-07-18 DIAGNOSIS — I10 ESSENTIAL HYPERTENSION: ICD-10-CM

## 2023-07-18 RX ORDER — METOPROLOL SUCCINATE 100 MG/1
TABLET, EXTENDED RELEASE ORAL
Qty: 30 TABLET | Refills: 0 | OUTPATIENT
Start: 2023-07-18

## 2023-07-19 ENCOUNTER — OFFICE VISIT (OUTPATIENT)
Dept: FAMILY MEDICINE CLINIC | Age: 53
End: 2023-07-19
Payer: COMMERCIAL

## 2023-07-19 VITALS
HEART RATE: 62 BPM | DIASTOLIC BLOOD PRESSURE: 80 MMHG | WEIGHT: 262 LBS | SYSTOLIC BLOOD PRESSURE: 108 MMHG | BODY MASS INDEX: 40.43 KG/M2 | OXYGEN SATURATION: 100 %

## 2023-07-19 DIAGNOSIS — N64.52 DISCHARGE FROM RIGHT NIPPLE: Primary | ICD-10-CM

## 2023-07-19 DIAGNOSIS — N64.4 NIPPLE TENDERNESS: ICD-10-CM

## 2023-07-19 DIAGNOSIS — Z85.3 HISTORY OF BREAST CANCER: ICD-10-CM

## 2023-07-19 PROCEDURE — 99213 OFFICE O/P EST LOW 20 MIN: CPT | Performed by: FAMILY MEDICINE

## 2023-07-19 PROCEDURE — 3074F SYST BP LT 130 MM HG: CPT | Performed by: FAMILY MEDICINE

## 2023-07-19 PROCEDURE — 3079F DIAST BP 80-89 MM HG: CPT | Performed by: FAMILY MEDICINE

## 2023-07-19 NOTE — PROGRESS NOTES
Patient ID: Kalani Felder 1970    Chief Complaint   Patient presents with    Breast Problem     Right nipple had clear discharge  mamogram schedule for next week          HPI    Nipple discharge: Right nipple. On one occasion. It was clear and sticky. Not bloody. Had about a nickel size area of discharge on her bra. History of breast cancer in the right breast.  No discharge seen since. Patient Active Problem List   Diagnosis    History of breast cancer    Hyperlipidemia    Headache    Asthma    Malignant neoplasm of right female breast ([address])    Impaired glucose tolerance    Essential hypertension    Left foot pain    Obesity, Class III, BMI 40-49.9 (morbid obesity) (HCC)    Chronic pain of left lower extremity    Right-sided chest wall pain    Right lateral epicondylitis       Past Surgical History:   Procedure Laterality Date    BREAST BIOPSY      COLONOSCOPY      polyp. Repeat in     COLONOSCOPY  2016    diverticulosis    ELBOW FRACTURE SURGERY Right 12/10/2019    RIGHT LATERAL EPICONDYLITIS TENDON DEBRIDEMENT, BOSWORTH PROCEDURE performed by Pablo Dillard DO at [address] (CERVIX STATUS UNKNOWN)      Cysts, Endometriosis.   1 ovary remains    TONSILLECTOMY AND ADENOIDECTOMY      TUBAL LIGATION         Family History   Problem Relation Age of Onset    Hypertension Father     Stroke Maternal Aunt     Diabetes Paternal Aunt     Diabetes Paternal Uncle     Stroke Maternal Grandmother     Depression Son     Asthma Son     Stroke Mother 79         in 2017    Lung Cancer Sister                Current Outpatient Medications on File Prior to Visit   Medication Sig Dispense Refill    chlorthalidone (HYGROTON) 25 MG tablet Take 1 tablet by mouth daily 90 tablet 0    metoprolol succinate (TOPROL XL) 100 MG extended release tablet Take 1 tablet by mouth daily 30 tablet 0    polyethylene glycol (GLYCOLAX) 17 GM/SCOOP powder AS DIRECTED  ONE TIME FOR COLONOSCOPY

## 2023-07-19 NOTE — PATIENT INSTRUCTIONS
DON'T FORGET TO VOTE ON TUESDAY AUGUST EIGHTH      BRING YOUR MEDICATION BOTTLES TO ALL APPOINTMENTS    Check MY CHART for test results. If you have forgotten your password, call [phone]. The diagnoses and medications listed in this after visit summary may not be up to date. Check MY CHART in 28-48 hours for corrections. Late cancellation policy: So that we can better accommodate people who are sick, please give our office 24 hour notice for an appointment cancellation. Missed appointments: Your care is very important to us. It is important that you keep your scheduled appointments. Multiple missed appointments will lead to a dismissal from the office. Patients arriving late will be worked into the schedule as time permits, with patients arriving on time taken as scheduled. Late arriving patients are more than welcome to wait or reschedule their appointments. Please allow 5-7 business days for paperwork to be processed.

## 2023-07-21 ENCOUNTER — HOSPITAL ENCOUNTER (OUTPATIENT)
Dept: NON INVASIVE DIAGNOSTICS | Age: 53
Discharge: HOME | End: 2023-07-21
Payer: COMMERCIAL

## 2023-07-21 ENCOUNTER — PREP FOR PROCEDURE (OUTPATIENT)
Dept: GASTROENTEROLOGY | Age: 53
End: 2023-07-21

## 2023-07-21 DIAGNOSIS — Z01.818 PREOP TESTING: Primary | ICD-10-CM

## 2023-07-21 DIAGNOSIS — R00.2 HEART PALPITATIONS: ICD-10-CM

## 2023-07-21 PROCEDURE — 93225 XTRNL ECG REC<48 HRS REC: CPT

## 2023-07-21 PROCEDURE — 93226 XTRNL ECG REC<48 HR SCAN A/R: CPT

## 2023-07-21 RX ORDER — SODIUM CHLORIDE 0.9 % (FLUSH) 0.9 %
5-40 SYRINGE (ML) INJECTION EVERY 12 HOURS SCHEDULED
Status: CANCELLED | OUTPATIENT
Start: 2023-07-21

## 2023-07-21 RX ORDER — SODIUM CHLORIDE 9 MG/ML
25 INJECTION, SOLUTION INTRAVENOUS PRN
Status: CANCELLED | OUTPATIENT
Start: 2023-07-21

## 2023-07-21 RX ORDER — SODIUM CHLORIDE 0.9 % (FLUSH) 0.9 %
5-40 SYRINGE (ML) INJECTION PRN
Status: CANCELLED | OUTPATIENT
Start: 2023-07-21

## 2023-07-21 RX ORDER — SODIUM CHLORIDE, SODIUM LACTATE, POTASSIUM CHLORIDE, CALCIUM CHLORIDE 600; 310; 30; 20 MG/100ML; MG/100ML; MG/100ML; MG/100ML
INJECTION, SOLUTION INTRAVENOUS CONTINUOUS
Status: CANCELLED | OUTPATIENT
Start: 2023-07-21

## 2023-07-25 ENCOUNTER — HOSPITAL ENCOUNTER (OUTPATIENT)
Age: 53
Setting detail: OUTPATIENT SURGERY
Discharge: HOME | End: 2023-07-25
Attending: INTERNAL MEDICINE | Admitting: INTERNAL MEDICINE
Payer: COMMERCIAL

## 2023-07-25 ENCOUNTER — ANESTHESIA EVENT (OUTPATIENT)
Dept: ENDOSCOPY | Age: 53
End: 2023-07-25
Payer: COMMERCIAL

## 2023-07-25 ENCOUNTER — ANESTHESIA (OUTPATIENT)
Dept: ENDOSCOPY | Age: 53
End: 2023-07-25
Payer: COMMERCIAL

## 2023-07-25 VITALS
SYSTOLIC BLOOD PRESSURE: 111 MMHG | RESPIRATION RATE: 16 BRPM | HEART RATE: 51 BPM | OXYGEN SATURATION: 100 % | BODY MASS INDEX: 40.81 KG/M2 | DIASTOLIC BLOOD PRESSURE: 69 MMHG | WEIGHT: 260 LBS | HEIGHT: 67 IN | TEMPERATURE: 96.8 F

## 2023-07-25 PROBLEM — Z12.11 SCREENING FOR COLON CANCER: Status: ACTIVE | Noted: 2023-07-25

## 2023-07-25 PROBLEM — Z86.010 HISTORY OF COLON POLYPS: Status: ACTIVE | Noted: 2023-07-25

## 2023-07-25 PROBLEM — Z86.0100 HISTORY OF COLON POLYPS: Status: ACTIVE | Noted: 2023-07-25

## 2023-07-25 PROCEDURE — 2709999900 HC NON-CHARGEABLE SUPPLY: Performed by: INTERNAL MEDICINE

## 2023-07-25 PROCEDURE — 2580000003 HC RX 258: Performed by: ANESTHESIOLOGY

## 2023-07-25 PROCEDURE — 7100000010 HC PHASE II RECOVERY - FIRST 15 MIN: Performed by: INTERNAL MEDICINE

## 2023-07-25 PROCEDURE — 6360000002 HC RX W HCPCS: Performed by: NURSE ANESTHETIST, CERTIFIED REGISTERED

## 2023-07-25 PROCEDURE — 3609027000 HC COLONOSCOPY: Performed by: INTERNAL MEDICINE

## 2023-07-25 PROCEDURE — 45378 DIAGNOSTIC COLONOSCOPY: CPT | Performed by: INTERNAL MEDICINE

## 2023-07-25 PROCEDURE — 3700000001 HC ADD 15 MINUTES (ANESTHESIA): Performed by: INTERNAL MEDICINE

## 2023-07-25 PROCEDURE — 7100000011 HC PHASE II RECOVERY - ADDTL 15 MIN: Performed by: INTERNAL MEDICINE

## 2023-07-25 PROCEDURE — 3700000000 HC ANESTHESIA ATTENDED CARE: Performed by: INTERNAL MEDICINE

## 2023-07-25 RX ORDER — PROPOFOL 10 MG/ML
INJECTION, EMULSION INTRAVENOUS PRN
Status: DISCONTINUED | OUTPATIENT
Start: 2023-07-25 | End: 2023-07-25 | Stop reason: SDUPTHER

## 2023-07-25 RX ORDER — SODIUM CHLORIDE, SODIUM LACTATE, POTASSIUM CHLORIDE, CALCIUM CHLORIDE 600; 310; 30; 20 MG/100ML; MG/100ML; MG/100ML; MG/100ML
INJECTION, SOLUTION INTRAVENOUS CONTINUOUS
Status: DISCONTINUED | OUTPATIENT
Start: 2023-07-25 | End: 2023-07-25 | Stop reason: HOSPADM

## 2023-07-25 RX ORDER — LIDOCAINE HYDROCHLORIDE 20 MG/ML
INJECTION, SOLUTION INTRAVENOUS PRN
Status: DISCONTINUED | OUTPATIENT
Start: 2023-07-25 | End: 2023-07-25 | Stop reason: SDUPTHER

## 2023-07-25 RX ADMIN — PROPOFOL 310 MG: 10 INJECTION, EMULSION INTRAVENOUS at 10:03

## 2023-07-25 RX ADMIN — SODIUM CHLORIDE, POTASSIUM CHLORIDE, SODIUM LACTATE AND CALCIUM CHLORIDE: 600; 310; 30; 20 INJECTION, SOLUTION INTRAVENOUS at 09:38

## 2023-07-25 RX ADMIN — LIDOCAINE HYDROCHLORIDE 100 MG: 20 INJECTION, SOLUTION INTRAVENOUS at 09:48

## 2023-07-25 ASSESSMENT — PAIN - FUNCTIONAL ASSESSMENT: PAIN_FUNCTIONAL_ASSESSMENT: 0-10

## 2023-07-25 ASSESSMENT — PAIN SCALES - GENERAL: PAINLEVEL_OUTOF10: 0

## 2023-07-25 NOTE — ANESTHESIA POSTPROCEDURE EVALUATION
Department of Anesthesiology  Postprocedure Note    Patient: Aydee Melendez  MRN: [id number]  YOB: 1970  Date of evaluation: 7/25/2023      Procedure Summary     Date: 07/25/23 Room / Location: 2010 [facility]    Anesthesia Start: 6172 Anesthesia Stop: 1005    Procedure: COLONOSCOPY DIAGNOSTIC Diagnosis:       Diverticulosis      (Diverticulosis [K57.90])    Surgeons: Kenyetta Daniel MD Responsible Provider: Hiwot Cotto MD    Anesthesia Type: MAC ASA Status: 2          Anesthesia Type: No value filed.     Maricel Phase I: Maricel Score: 10    Maricel Phase II:        Anesthesia Post Evaluation    Patient location during evaluation: PACU  Patient participation: complete - patient participated  Level of consciousness: awake and alert  Pain score: 0  Airway patency: patent  Nausea & Vomiting: no nausea and no vomiting  Complications: no  Cardiovascular status: blood pressure returned to baseline  Respiratory status: acceptable  Hydration status: euvolemic

## 2023-07-25 NOTE — BRIEF OP NOTE
Brief Postoperative Note      Patient: Galina Narvaez  YOB: 1970  MRN: [id number]    Date of Procedure: 7/25/2023    Pre-Op Diagnosis Codes:     * Diverticulosis [K57.90]    Post-Op Diagnosis: Diverticulosis seen throughout the colon, no recurrent polyps. Procedure(s):  COLONOSCOPY DIAGNOSTIC    Surgeon(s):  Rolando Anderson MD    Assistant:  * No surgical staff found *    Anesthesia: Monitor Anesthesia Care    Estimated Blood Loss (mL): Minimal    Complications: None    Specimens:   * No specimens in log *    Implants:  * No implants in log *      Drains: * No LDAs found *    Findings: As above, repeat colon in 5 years.        Electronically signed by Rolando Anderson MD on 7/25/2023 at 10:09 AM

## 2023-07-25 NOTE — ANESTHESIA POSTPROCEDURE EVALUATION
Department of Anesthesiology  Postprocedure Note    Patient: Dante Cerrato  MRN: [id number]  YOB: 1970  Date of evaluation: 7/25/2023      Procedure Summary     Date: 07/25/23 Room / Location: 2010 [facility]    Anesthesia Start: 5722 Anesthesia Stop: 1005    Procedure: COLONOSCOPY DIAGNOSTIC Diagnosis:       Diverticulosis      (Diverticulosis [K57.90])    Surgeons: Adams Garzon MD Responsible Provider: Yoshi Card MD    Anesthesia Type: MAC ASA Status: 2          Anesthesia Type: No value filed.     Maricel Phase I: Maricel Score: 10    Maricel Phase II:        Anesthesia Post Evaluation    Patient location during evaluation: bedside  Patient participation: complete - patient participated  Level of consciousness: awake and alert  Pain score: 0  Airway patency: patent  Nausea & Vomiting: no nausea and no vomiting  Complications: no  Cardiovascular status: blood pressure returned to baseline  Respiratory status: acceptable  Hydration status: euvolemic

## 2023-07-25 NOTE — DISCHARGE INSTRUCTIONS
COLONOSCOPY    _________________________________    OFFICE [phone]______________________    FOLLOW UP APPOINTMENT AS NEEDED. REPEAT PROCEDURE IN ___5___YEARS OR AS NEEDED. TEST ORDERED: NONE______________________________________________________________________. What to expect at home: Your Recovery   Your doctor will tell you when you can eat and do your other usual activities Your doctor will talk to you about when you will need your next colonoscopy. Your doctor can help you decide how often you need to be checked. This will depend on the results of your test and your risk for colorectal cancer. After the test, you may be bloated or have gas pains. You may need to pass gas. If a biopsy was done or a polyp was removed, you may have streaks of blood in your stool (feces) for a few days. This care sheet gives you a general idea about how long it will take for you to recover. But each person recovers at a different pace. Follow the steps below to get better as quickly as possible. How can you care for yourself at home? Activity  Rest when you feel tired. Diet  Follow your doctor's directions for eating. Unless your doctor has told you not to, drink plenty of fluids. This helps to replace the fluids that were lost during the colon prep. DO NOT DRINK ALCOHOL. Medicines  Your doctor will tell you if and when you can restart your medicines. He or she will also give you instructions about taking any new medicines. If you take blood thinners, such as warfarin (Coumadin), clopidogrel (Plavix), or aspirin, be sure to talk to your doctor. He or she will tell you if and when to start taking those medicines again. Make sure that you understand exactly what your doctor wants you to do. If polyps were removed or a biopsy was done during the test, your doctor may tell you not to take aspirin or other anti-inflammatory medicines for a few days.  These include ibuprofen (Advil, Motrin) and

## 2023-07-25 NOTE — PROGRESS NOTES
1050:  Pt discharged to home alert and oriented with no c/o discomfort. Pt tolerating PO, VSS. Vital Signs Last 24 Hrs  T(C): 36.7 (03-01-22 @ 07:36), Max: 36.7 (02-28-22 @ 20:41)  T(F): 98 (03-01-22 @ 07:36), Max: 98 (02-28-22 @ 20:41)  HR: --  BP: --  BP(mean): --  RR: --  SpO2: --    Orthostatic VS  03-01-22 @ 07:36  Lying BP: --/-- HR: --  Sitting BP: 133/51 HR: 78  Standing BP: --/-- HR: --  Site: --  Mode: --  Orthostatic VS  02-28-22 @ 20:41  Lying BP: --/-- HR: --  Sitting BP: 137/52 HR: 81  Standing BP: --/-- HR: --  Site: upper left arm  Mode: electronic  Orthostatic VS  02-27-22 @ 21:02  Lying BP: --/-- HR: --  Sitting BP: 152/62 HR: 87  Standing BP: 132/70 HR: 85  Site: --  Mode: --

## 2023-08-01 ENCOUNTER — HOSPITAL ENCOUNTER (OUTPATIENT)
Dept: ULTRASOUND IMAGING | Age: 53
Discharge: HOME | End: 2023-08-01
Attending: FAMILY MEDICINE
Payer: COMMERCIAL

## 2023-08-01 ENCOUNTER — HOSPITAL ENCOUNTER (OUTPATIENT)
Dept: WOMENS IMAGING | Age: 53
Discharge: HOME | End: 2023-08-01
Attending: FAMILY MEDICINE
Payer: COMMERCIAL

## 2023-08-01 VITALS — BODY MASS INDEX: 39.4 KG/M2 | WEIGHT: 260 LBS | HEIGHT: 68 IN

## 2023-08-01 DIAGNOSIS — N64.52 DISCHARGE FROM RIGHT NIPPLE: ICD-10-CM

## 2023-08-01 DIAGNOSIS — Z85.3 HISTORY OF BREAST CANCER: ICD-10-CM

## 2023-08-01 PROCEDURE — 76642 ULTRASOUND BREAST LIMITED: CPT

## 2023-08-01 PROCEDURE — G0279 TOMOSYNTHESIS, MAMMO: HCPCS

## 2023-08-03 DIAGNOSIS — Z85.3 HISTORY OF BREAST CANCER: ICD-10-CM

## 2023-08-03 DIAGNOSIS — R92.8 ABNORMAL MAMMOGRAM: Primary | ICD-10-CM

## 2023-08-24 PROBLEM — Z12.11 SCREENING FOR COLON CANCER: Status: RESOLVED | Noted: 2023-07-25 | Resolved: 2023-08-24

## 2023-08-30 ENCOUNTER — OFFICE VISIT (OUTPATIENT)
Dept: FAMILY MEDICINE CLINIC | Age: 53
End: 2023-08-30
Payer: COMMERCIAL

## 2023-08-30 VITALS
WEIGHT: 268 LBS | SYSTOLIC BLOOD PRESSURE: 122 MMHG | OXYGEN SATURATION: 96 % | DIASTOLIC BLOOD PRESSURE: 84 MMHG | HEART RATE: 80 BPM | BODY MASS INDEX: 40.75 KG/M2

## 2023-08-30 DIAGNOSIS — I10 ESSENTIAL HYPERTENSION: Primary | ICD-10-CM

## 2023-08-30 DIAGNOSIS — J45.20 MILD INTERMITTENT ASTHMA WITHOUT COMPLICATION: ICD-10-CM

## 2023-08-30 DIAGNOSIS — R51.9 HEADACHE DISORDER: ICD-10-CM

## 2023-08-30 DIAGNOSIS — Z85.3 HISTORY OF BREAST CANCER: ICD-10-CM

## 2023-08-30 PROCEDURE — G8417 CALC BMI ABV UP PARAM F/U: HCPCS | Performed by: FAMILY MEDICINE

## 2023-08-30 PROCEDURE — 3079F DIAST BP 80-89 MM HG: CPT | Performed by: FAMILY MEDICINE

## 2023-08-30 PROCEDURE — G8427 DOCREV CUR MEDS BY ELIG CLIN: HCPCS | Performed by: FAMILY MEDICINE

## 2023-08-30 PROCEDURE — 3074F SYST BP LT 130 MM HG: CPT | Performed by: FAMILY MEDICINE

## 2023-08-30 PROCEDURE — 1036F TOBACCO NON-USER: CPT | Performed by: FAMILY MEDICINE

## 2023-08-30 PROCEDURE — 3017F COLORECTAL CA SCREEN DOC REV: CPT | Performed by: FAMILY MEDICINE

## 2023-08-30 PROCEDURE — 99214 OFFICE O/P EST MOD 30 MIN: CPT | Performed by: FAMILY MEDICINE

## 2023-08-30 RX ORDER — METOPROLOL SUCCINATE 100 MG/1
100 TABLET, EXTENDED RELEASE ORAL DAILY
Qty: 90 TABLET | Refills: 1 | Status: SHIPPED | OUTPATIENT
Start: 2023-08-30

## 2023-08-30 RX ORDER — SUMATRIPTAN 100 MG/1
100 TABLET, FILM COATED ORAL 2 TIMES DAILY PRN
Qty: 27 TABLET | Refills: 1 | Status: CANCELLED | OUTPATIENT
Start: 2023-08-30

## 2023-08-30 RX ORDER — CHLORTHALIDONE 25 MG/1
25 TABLET ORAL DAILY
Qty: 90 TABLET | Refills: 1 | Status: SHIPPED | OUTPATIENT
Start: 2023-08-30

## 2023-08-30 RX ORDER — SUMATRIPTAN 6 MG/.5ML
6 INJECTION, SOLUTION SUBCUTANEOUS DAILY PRN
Qty: 0.5 ML | Refills: 5 | Status: CANCELLED | OUTPATIENT
Start: 2023-08-30

## 2023-08-30 ASSESSMENT — ENCOUNTER SYMPTOMS: CHEST TIGHTNESS: 0

## 2023-08-30 NOTE — PROGRESS NOTES
Patient ID: Jono Medellin 1970    . Chief Complaint   Patient presents with    Hypertension    Headache     Has improved          Hypertension  This is a chronic problem. The problem is controlled. Risk factors for coronary artery disease include obesity and post-menopausal state. Past treatments include beta blockers. The current treatment provides mild improvement. Headache  Headache pattern: rarely has to take imitrex. should have enough medication to last until next visit for imitrex. Asthma  There is no chest tightness. This is a chronic problem. The current episode started more than 1 year ago. The problem occurs rarely. The problem has been unchanged. Her symptoms are alleviated by beta-agonist and steroid inhaler. Her past medical history is significant for asthma. Nipple discharge: Has followed up with her general surgeon. He has reassured her that it is likely benign. Patient Active Problem List   Diagnosis    History of breast cancer    Hyperlipidemia    Headache    Asthma    Malignant neoplasm of right female breast ([address])    Impaired glucose tolerance    Essential hypertension    Left foot pain    Obesity, Class III, BMI 40-49.9 (morbid obesity) (HCC)    Chronic pain of left lower extremity    Right-sided chest wall pain    Right lateral epicondylitis    History of colon polyps       Past Surgical History:   Procedure Laterality Date    BREAST BIOPSY  2009    BREAST LUMPECTOMY Right 01/01/2009    COLONOSCOPY  2010    polyp. Repeat in 2015    COLONOSCOPY  12/12/2016    diverticulosis    COLONOSCOPY N/A 07/25/2023    COLONOSCOPY DIAGNOSTIC performed by Eva Oates MD at [address] Right 12/10/2019    RIGHT LATERAL EPICONDYLITIS TENDON DEBRIDEMENT, BOSWORTH PROCEDURE performed by Ruperto Barnes DO at [address] (CERVIX STATUS UNKNOWN)      Cysts, Endometriosis.   1 ovary remains    OVARY REMOVAL Left     TONSILLECTOMY AND ADENOIDECTOMY

## 2023-09-26 ENCOUNTER — TELEPHONE (OUTPATIENT)
Dept: FAMILY MEDICINE CLINIC | Age: 53
End: 2023-09-26

## 2023-09-26 NOTE — TELEPHONE ENCOUNTER
Patient asking if she should schedule an appointment with you or Dr Amberly Cheema regarding right breast swollen and tender. It is also tender under arm pit towards back. Difficulty sleeping. There is no knot. Patient had an 10 Mg [place] right breast done on 8/01/23. Patient saw Dr Amberly Cheema about 6 weeks ago for discharge of nipple right breast. He told her that she had a cyst and to follow up in 6 months. The swelling and tenderness started about a week ago. No discharge. Please advise.

## 2023-09-27 ENCOUNTER — OFFICE VISIT (OUTPATIENT)
Dept: FAMILY MEDICINE CLINIC | Age: 53
End: 2023-09-27
Payer: COMMERCIAL

## 2023-09-27 VITALS
BODY MASS INDEX: 39.62 KG/M2 | HEART RATE: 74 BPM | OXYGEN SATURATION: 100 % | DIASTOLIC BLOOD PRESSURE: 62 MMHG | WEIGHT: 260.6 LBS | SYSTOLIC BLOOD PRESSURE: 113 MMHG

## 2023-09-27 DIAGNOSIS — Z23 NEEDS FLU SHOT: ICD-10-CM

## 2023-09-27 DIAGNOSIS — Z85.3 HISTORY OF BREAST CANCER: ICD-10-CM

## 2023-09-27 DIAGNOSIS — M79.621 AXILLARY PAIN, RIGHT: Primary | ICD-10-CM

## 2023-09-27 PROCEDURE — 3078F DIAST BP <80 MM HG: CPT | Performed by: FAMILY MEDICINE

## 2023-09-27 PROCEDURE — 90674 CCIIV4 VAC NO PRSV 0.5 ML IM: CPT | Performed by: FAMILY MEDICINE

## 2023-09-27 PROCEDURE — 3074F SYST BP LT 130 MM HG: CPT | Performed by: FAMILY MEDICINE

## 2023-09-27 PROCEDURE — 90471 IMMUNIZATION ADMIN: CPT | Performed by: FAMILY MEDICINE

## 2023-09-27 PROCEDURE — 99213 OFFICE O/P EST LOW 20 MIN: CPT | Performed by: FAMILY MEDICINE

## 2023-09-29 ENCOUNTER — HOSPITAL ENCOUNTER (OUTPATIENT)
Dept: ULTRASOUND IMAGING | Age: 53
Discharge: HOME | End: 2023-09-29
Attending: FAMILY MEDICINE
Payer: COMMERCIAL

## 2023-09-29 DIAGNOSIS — M79.621 AXILLARY PAIN, RIGHT: ICD-10-CM

## 2023-09-29 DIAGNOSIS — Z85.3 HISTORY OF BREAST CANCER: ICD-10-CM

## 2023-09-29 PROCEDURE — 76882 US LMTD JT/FCL EVL NVASC XTR: CPT

## 2023-10-11 ENCOUNTER — TELEPHONE (OUTPATIENT)
Dept: FAMILY MEDICINE CLINIC | Age: 53
End: 2023-10-11

## 2023-10-13 NOTE — TELEPHONE ENCOUNTER
Please have radiology clarify what is being requested.   Have them send letter of explanation to patient
Tried calling radiology no answer
mammogram. Any changes in your breast(s) should be brought to the attention of your healthcare provider immediately. Please keep in mind that good breast care involves a combination of three important steps: breast self awareness, an annual examination by a health care professional, and annual mammograms. The [employer] of [employer]iology (ACR) and Society of Breast Imaging (SBI) recommend that women start getting annual mammograms at age 36. Thank you for allowing us to help in meeting your health care needs. Sincerely,     [facility] Mayra Speaker on 8/2/2023         [address] [facility]  [facility] [address]  Dept: [phone]        Eddye Leaver  [address] [place] [address] August 2, 2023  MRN: [id number]         Dear Susana Robison,     Your recent breast imaging exam on 8/1/23 showed an area that we believe is probably benign (not cancer). However, you should have a follow-up exam to confirm that this area has not changed. Recommended Follow-Up and Due Date: Diagnostic Mammogram in 6 Months - Right  1/28/2024  Breast Ultrasound in 6 Months - Right  1/28/2024. A report of your results was sent to: Adalgisa Reynolds MD  . Your images will become part of your medical record at [address] [place] [address]. They will be on file for your ongoing care. If, in the future, you change health care providers or go to a different location for a mammogram, you should tell them where and when this breast imaging exam was done. A written order from your physician is required for this exam. Please call your provider to obtain the appropriate order(s) and schedule your appointment.   If the order has already been obtained you may call to schedule an appointment for the recommended exam.     Although mammography is the most accurate method for early

## 2023-11-28 ENCOUNTER — HOSPITAL ENCOUNTER (OUTPATIENT)
Dept: ULTRASOUND IMAGING | Age: 53
Discharge: HOME | End: 2023-11-28
Attending: FAMILY MEDICINE
Payer: COMMERCIAL

## 2023-11-28 ENCOUNTER — HOSPITAL ENCOUNTER (OUTPATIENT)
Dept: WOMENS IMAGING | Age: 53
Discharge: HOME | End: 2023-11-28
Attending: FAMILY MEDICINE
Payer: COMMERCIAL

## 2023-11-28 DIAGNOSIS — Z85.3 HISTORY OF BREAST CANCER: ICD-10-CM

## 2023-11-28 DIAGNOSIS — R92.8 ABNORMAL MAMMOGRAM: ICD-10-CM

## 2023-11-28 DIAGNOSIS — N63.10 MASS OF RIGHT BREAST, UNSPECIFIED QUADRANT: ICD-10-CM

## 2023-11-28 PROCEDURE — 76642 ULTRASOUND BREAST LIMITED: CPT

## 2024-02-21 ENCOUNTER — OFFICE VISIT (OUTPATIENT)
Dept: FAMILY MEDICINE CLINIC | Age: 54
End: 2024-02-21
Payer: COMMERCIAL

## 2024-02-21 VITALS
SYSTOLIC BLOOD PRESSURE: 124 MMHG | WEIGHT: 262 LBS | DIASTOLIC BLOOD PRESSURE: 84 MMHG | HEIGHT: 68 IN | OXYGEN SATURATION: 99 % | BODY MASS INDEX: 39.71 KG/M2 | HEART RATE: 52 BPM

## 2024-02-21 DIAGNOSIS — J45.20 MILD INTERMITTENT ASTHMA WITHOUT COMPLICATION: ICD-10-CM

## 2024-02-21 DIAGNOSIS — Z23 NEED FOR PNEUMOCOCCAL VACCINE: ICD-10-CM

## 2024-02-21 DIAGNOSIS — M54.31 SCIATICA, RIGHT SIDE: ICD-10-CM

## 2024-02-21 DIAGNOSIS — E78.5 HYPERLIPIDEMIA, UNSPECIFIED HYPERLIPIDEMIA TYPE: ICD-10-CM

## 2024-02-21 DIAGNOSIS — I10 ESSENTIAL HYPERTENSION: Primary | ICD-10-CM

## 2024-02-21 DIAGNOSIS — M43.6 STIFF NECK: ICD-10-CM

## 2024-02-21 PROCEDURE — 90677 PCV20 VACCINE IM: CPT | Performed by: FAMILY MEDICINE

## 2024-02-21 PROCEDURE — 99214 OFFICE O/P EST MOD 30 MIN: CPT | Performed by: FAMILY MEDICINE

## 2024-02-21 PROCEDURE — 3079F DIAST BP 80-89 MM HG: CPT | Performed by: FAMILY MEDICINE

## 2024-02-21 PROCEDURE — 90471 IMMUNIZATION ADMIN: CPT | Performed by: FAMILY MEDICINE

## 2024-02-21 PROCEDURE — 3074F SYST BP LT 130 MM HG: CPT | Performed by: FAMILY MEDICINE

## 2024-02-21 RX ORDER — ATORVASTATIN CALCIUM 80 MG/1
80 TABLET, FILM COATED ORAL DAILY
Qty: 90 TABLET | Refills: 1 | Status: SHIPPED | OUTPATIENT
Start: 2024-02-21

## 2024-02-21 RX ORDER — METOPROLOL SUCCINATE 100 MG/1
100 TABLET, EXTENDED RELEASE ORAL DAILY
Qty: 90 TABLET | Refills: 1 | Status: SHIPPED | OUTPATIENT
Start: 2024-02-21

## 2024-02-21 RX ORDER — ALBUTEROL SULFATE 90 UG/1
2 AEROSOL, METERED RESPIRATORY (INHALATION) EVERY 4 HOURS PRN
Qty: 18 G | Refills: 1 | Status: SHIPPED | OUTPATIENT
Start: 2024-02-21

## 2024-02-21 RX ORDER — CHLORTHALIDONE 25 MG/1
25 TABLET ORAL DAILY
Qty: 90 TABLET | Refills: 1 | Status: SHIPPED | OUTPATIENT
Start: 2024-02-21

## 2024-02-21 ASSESSMENT — ENCOUNTER SYMPTOMS: CHEST TIGHTNESS: 0

## 2024-02-21 ASSESSMENT — PATIENT HEALTH QUESTIONNAIRE - PHQ9
1. LITTLE INTEREST OR PLEASURE IN DOING THINGS: 0
2. FEELING DOWN, DEPRESSED OR HOPELESS: 0
SUM OF ALL RESPONSES TO PHQ QUESTIONS 1-9: 0
SUM OF ALL RESPONSES TO PHQ9 QUESTIONS 1 & 2: 0
SUM OF ALL RESPONSES TO PHQ QUESTIONS 1-9: 0

## 2024-02-21 NOTE — PROGRESS NOTES
Patient ID: Kamla Rosenbaum 1970    .  Chief Complaint   Patient presents with    Asthma    Hypertension         Asthma  There is no chest tightness. This is a chronic problem. The current episode started more than 1 year ago. The problem occurs rarely. The problem has been unchanged. Associated symptoms include headaches. Her symptoms are alleviated by beta-agonist and steroid inhaler. Her past medical history is significant for asthma.   Hypertension  This is a chronic problem. The problem is controlled. Associated symptoms include headaches. Risk factors for coronary artery disease include obesity and post-menopausal state. Past treatments include beta blockers. The current treatment provides mild improvement.   Headache  Headache pattern: rarely has to take imitrex.  should have enough medication to last until next visit for imitrex.    Leg ache: 1-2 months.  Right leg.  Only at night.  Relieved by falling asleep.  Radiates to right hip.  Tylenol 1000 helps.    Neck: stiff.  Feels its related to the leg ache    Patient Active Problem List   Diagnosis    History of breast cancer    Hyperlipidemia    Headache    Asthma    Malignant neoplasm of right female breast (HCC)    Impaired glucose tolerance    Essential hypertension    Left foot pain    Obesity, Class III, BMI 40-49.9 (morbid obesity) (HCC)    Chronic pain of left lower extremity    Right-sided chest wall pain    Right lateral epicondylitis    History of colon polyps       Past Surgical History:   Procedure Laterality Date    BREAST BIOPSY  2009    BREAST LUMPECTOMY Right 01/01/2009    COLONOSCOPY  2010    polyp.  Repeat in 2015    COLONOSCOPY  12/12/2016    diverticulosis    COLONOSCOPY N/A 07/25/2023    COLONOSCOPY DIAGNOSTIC performed by Chris Salomon MD at [facility] ENDOSCOPY    ELBOW FRACTURE SURGERY Right 12/10/2019    RIGHT LATERAL EPICONDYLITIS TENDON DEBRIDEMENT, BOSWORTH PROCEDURE performed by Chris Cleveland DO at [facility] OR    HYSTERECTOMY

## 2024-02-21 NOTE — PATIENT INSTRUCTIONS
list of the medicines you take.  Where can you learn more?  Go to [email or web address] and sign in to your [employer] account. Enter M679 in the Search Health Information box to learn more about \"Neck Strain or Sprain: Rehab Exercises.\"     If you do not have an account, please click on the \"Sign Up Now\" link.  Current as of: September 20, 2018  Content Version: 12.0  © 1941-0157 [employer]. Care instructions adapted under license by [employer]. If you have questions about a medical condition or this instruction, always ask your healthcare professional. [employer] disclaims any warranty or liability for your use of this information.      NEW OFFICE HOURS: M-TH 7AM-5PM      BRING YOUR MEDICATION BOTTLES TO ALL APPOINTMENTS    Check MY CHART for test results.  If you have forgotten your password, call [phone].    The diagnoses and medications listed in this after visit summary may not be up to date.  Check MY CHART in 28-48 hours for corrections.      Late cancellation policy: So that we can better accommodate people who are sick, please give our office 24 hour notice for an appointment cancellation.      Missed appointments: Your care is very important to us.  It is important that you keep your scheduled appointments.   Multiple missed appointments will lead to a dismissal from the office.      Patients arriving late will be worked into the schedule as time permits, with patients arriving on time taken as scheduled. Late arriving patients are more than welcome to wait or reschedule their appointments.    Please allow 5-7 business days for paperwork to be processed.

## 2024-03-08 ENCOUNTER — TELEPHONE (OUTPATIENT)
Dept: FAMILY MEDICINE CLINIC | Age: 54
End: 2024-03-08

## 2024-03-08 DIAGNOSIS — I10 ESSENTIAL HYPERTENSION: ICD-10-CM

## 2024-03-08 RX ORDER — METOPROLOL SUCCINATE 100 MG/1
100 TABLET, EXTENDED RELEASE ORAL DAILY
Qty: 90 TABLET | Refills: 1 | Status: SHIPPED | OUTPATIENT
Start: 2024-03-08

## 2024-03-08 NOTE — TELEPHONE ENCOUNTER
[facility] is out of the 50 mg of Metoprolol.   I tried to call the patient to see what other pharmacy this prescription can be sent to but she did not answer the phone.

## 2024-03-08 NOTE — TELEPHONE ENCOUNTER
[facility] called stating they did not have Metroprolol 100 mg but do have the 50 mg.  Pharmacy ssking if okay to switch to 50 mg take 2 day.  Dr. Benitez gave verbal okay.  Pharmacy notified.

## 2024-06-12 ENCOUNTER — OFFICE VISIT (OUTPATIENT)
Dept: FAMILY MEDICINE CLINIC | Age: 54
End: 2024-06-12

## 2024-06-12 VITALS
TEMPERATURE: 98.1 F | DIASTOLIC BLOOD PRESSURE: 82 MMHG | WEIGHT: 260.8 LBS | HEART RATE: 62 BPM | BODY MASS INDEX: 39.66 KG/M2 | SYSTOLIC BLOOD PRESSURE: 130 MMHG | OXYGEN SATURATION: 98 %

## 2024-06-12 DIAGNOSIS — L28.2 PRURITIC RASH: Primary | ICD-10-CM

## 2024-06-12 NOTE — PROGRESS NOTES
Patient ID: Kamla Rosenbaum 1970    Chief Complaint   Patient presents with    Rash     Has not gone away, bilateral arms, not as bad as the left, front and back of the neck, itching,          HPI    Rash on on left forearm.  Went to urgent care and got steroids.  Since going to the urgent care 3 weeks ago, her rash has spread to retirement up her upper left arm and to the right forearm.  She also feels itaround the back of her neck.  Does not think it's from sun exposure.      Patient Active Problem List   Diagnosis    History of breast cancer    Hyperlipidemia    Headache    Asthma    Malignant neoplasm of right female breast (HCC)    Impaired glucose tolerance    Essential hypertension    Left foot pain    Obesity, Class III, BMI 40-49.9 (morbid obesity) (HCC)    Chronic pain of left lower extremity    Right-sided chest wall pain    Right lateral epicondylitis    History of colon polyps       Past Surgical History:   Procedure Laterality Date    BREAST BIOPSY  2009    BREAST LUMPECTOMY Right 2009    COLONOSCOPY      polyp.  Repeat in     COLONOSCOPY  2016    diverticulosis    COLONOSCOPY N/A 2023    COLONOSCOPY DIAGNOSTIC performed by Chris Salomon MD at [facility] ENDOSCOPY    ELBOW FRACTURE SURGERY Right 12/10/2019    RIGHT LATERAL EPICONDYLITIS TENDON DEBRIDEMENT, BOSWORTH PROCEDURE performed by Chris Cleveland DO at [facility] OR    HYSTERECTOMY (CERVIX STATUS UNKNOWN)      Cysts, Endometriosis.  1 ovary remains    OVARY REMOVAL Left     TONSILLECTOMY AND ADENOIDECTOMY      TUBAL LIGATION         Family History   Problem Relation Age of Onset    Stroke Mother 67         in 2017    Hypertension Father     Lung Cancer Sister             Depression Son     Asthma Son     Stroke Maternal Grandmother     Stroke Maternal Aunt     Diabetes Paternal Aunt     Diabetes Paternal Uncle     Breast Cancer Neg Hx     Ovarian Cancer Neg Hx        Current Outpatient Medications on File Prior

## 2024-06-13 ENCOUNTER — TELEPHONE (OUTPATIENT)
Dept: FAMILY MEDICINE CLINIC | Age: 54
End: 2024-06-13

## 2024-06-13 NOTE — TELEPHONE ENCOUNTER
[facility] called asking for new order for the biopsy.  Please put in LAB 8 Surgical Specimen.

## 2024-06-24 ENCOUNTER — TELEPHONE (OUTPATIENT)
Dept: FAMILY MEDICINE CLINIC | Age: 54
End: 2024-06-24

## 2024-06-24 NOTE — TELEPHONE ENCOUNTER
Shows that the rash could be due to hives or an allergic reaction to medication.  If the rash is still a problem, I suggest that she temporarily stop her medications, one of the time, not including her blood pressure medicines.  She can do this for about a week.  If the rash gets better during that week, then she could possibly be allergic to that medication.     If we are unable to figure out what medicine is causing the rash, we will have to have her stop her blood pressure pills 1 at a time.  However I will have to give her a substitute blood pressure medication so that her blood pressure does not go high.

## 2024-09-10 ENCOUNTER — OFFICE VISIT (OUTPATIENT)
Dept: FAMILY MEDICINE CLINIC | Age: 54
End: 2024-09-10

## 2024-09-10 VITALS
DIASTOLIC BLOOD PRESSURE: 98 MMHG | TEMPERATURE: 98.1 F | SYSTOLIC BLOOD PRESSURE: 146 MMHG | BODY MASS INDEX: 40.91 KG/M2 | HEART RATE: 63 BPM | WEIGHT: 269 LBS | OXYGEN SATURATION: 98 %

## 2024-09-10 DIAGNOSIS — R60.0 LEG EDEMA, RIGHT: ICD-10-CM

## 2024-09-10 DIAGNOSIS — Z23 NEED FOR SHINGLES VACCINE: ICD-10-CM

## 2024-09-10 DIAGNOSIS — I10 ESSENTIAL HYPERTENSION: Primary | ICD-10-CM

## 2024-09-10 DIAGNOSIS — Z12.31 ENCOUNTER FOR SCREENING MAMMOGRAM FOR MALIGNANT NEOPLASM OF BREAST: ICD-10-CM

## 2024-09-10 DIAGNOSIS — M79.89 FINGER SWELLING: ICD-10-CM

## 2024-09-10 DIAGNOSIS — R63.5 WEIGHT INCREASING: ICD-10-CM

## 2024-09-10 DIAGNOSIS — Z23 NEED FOR COVID-19 VACCINE: ICD-10-CM

## 2024-09-10 DIAGNOSIS — E78.5 HYPERLIPIDEMIA, UNSPECIFIED HYPERLIPIDEMIA TYPE: ICD-10-CM

## 2024-09-10 DIAGNOSIS — Z23 NEED FOR INFLUENZA VACCINATION: ICD-10-CM

## 2024-09-10 RX ORDER — FUROSEMIDE 20 MG
20 TABLET ORAL DAILY
Qty: 30 TABLET | Refills: 0 | Status: SHIPPED | OUTPATIENT
Start: 2024-09-10

## 2024-09-10 RX ORDER — ATORVASTATIN CALCIUM 80 MG/1
80 TABLET, FILM COATED ORAL DAILY
Qty: 90 TABLET | Refills: 1 | Status: SHIPPED | OUTPATIENT
Start: 2024-09-10

## 2024-09-10 RX ORDER — METOPROLOL SUCCINATE 100 MG/1
100 TABLET, EXTENDED RELEASE ORAL DAILY
Qty: 90 TABLET | Refills: 1 | Status: SHIPPED | OUTPATIENT
Start: 2024-09-10

## 2024-09-10 ASSESSMENT — ENCOUNTER SYMPTOMS: CHEST TIGHTNESS: 0

## 2024-10-08 ENCOUNTER — OFFICE VISIT (OUTPATIENT)
Dept: FAMILY MEDICINE CLINIC | Age: 54
End: 2024-10-08
Payer: COMMERCIAL

## 2024-10-08 ENCOUNTER — HOSPITAL ENCOUNTER (OUTPATIENT)
Dept: WOMENS IMAGING | Age: 54
Discharge: HOME | End: 2024-10-08
Payer: COMMERCIAL

## 2024-10-08 VITALS — BODY MASS INDEX: 40.92 KG/M2 | HEIGHT: 68 IN | WEIGHT: 270 LBS

## 2024-10-08 VITALS
OXYGEN SATURATION: 100 % | DIASTOLIC BLOOD PRESSURE: 84 MMHG | BODY MASS INDEX: 41.98 KG/M2 | HEART RATE: 52 BPM | WEIGHT: 276 LBS | SYSTOLIC BLOOD PRESSURE: 122 MMHG

## 2024-10-08 DIAGNOSIS — Z12.31 ENCOUNTER FOR SCREENING MAMMOGRAM FOR MALIGNANT NEOPLASM OF BREAST: ICD-10-CM

## 2024-10-08 DIAGNOSIS — R60.0 LEG EDEMA: ICD-10-CM

## 2024-10-08 DIAGNOSIS — Z13.1 SCREENING FOR DIABETES MELLITUS: ICD-10-CM

## 2024-10-08 DIAGNOSIS — E66.813 CLASS 3 DRUG-INDUCED OBESITY WITH SERIOUS COMORBIDITY AND BODY MASS INDEX (BMI) OF 40.0 TO 44.9 IN ADULT: ICD-10-CM

## 2024-10-08 DIAGNOSIS — E66.1 CLASS 3 DRUG-INDUCED OBESITY WITH SERIOUS COMORBIDITY AND BODY MASS INDEX (BMI) OF 40.0 TO 44.9 IN ADULT: ICD-10-CM

## 2024-10-08 DIAGNOSIS — R51.9 HEADACHE DISORDER: ICD-10-CM

## 2024-10-08 DIAGNOSIS — I10 ESSENTIAL HYPERTENSION: ICD-10-CM

## 2024-10-08 DIAGNOSIS — E78.5 HYPERLIPIDEMIA, UNSPECIFIED HYPERLIPIDEMIA TYPE: Primary | ICD-10-CM

## 2024-10-08 LAB
ALBUMIN SERPL-MCNC: 4.2 G/DL (ref 3.4–5)
ALBUMIN/GLOB SERPL: 1.6 {RATIO} (ref 1.1–2.2)
ALP SERPL-CCNC: 97 U/L (ref 40–129)
ALT SERPL-CCNC: 28 U/L (ref 10–40)
ANION GAP SERPL CALCULATED.3IONS-SCNC: 9 MMOL/L (ref 3–16)
AST SERPL-CCNC: 25 U/L (ref 15–37)
BILIRUB SERPL-MCNC: 0.8 MG/DL (ref 0–1)
BUN SERPL-MCNC: 10 MG/DL (ref 7–20)
CALCIUM SERPL-MCNC: 9.9 MG/DL (ref 8.3–10.6)
CHLORIDE SERPL-SCNC: 100 MMOL/L (ref 99–110)
CHOLEST SERPL-MCNC: 201 MG/DL (ref 0–199)
CO2 SERPL-SCNC: 30 MMOL/L (ref 21–32)
CREAT SERPL-MCNC: 0.7 MG/DL (ref 0.6–1.1)
EST. AVERAGE GLUCOSE BLD GHB EST-MCNC: 119.8 MG/DL
GFR SERPLBLD CREATININE-BSD FMLA CKD-EPI: >90 ML/MIN/{1.73_M2}
GLUCOSE SERPL-MCNC: 93 MG/DL (ref 70–99)
HBA1C MFR BLD: 5.8 %
HDLC SERPL-MCNC: 52 MG/DL (ref 40–60)
LDLC SERPL CALC-MCNC: 116 MG/DL
POTASSIUM SERPL-SCNC: 4.4 MMOL/L (ref 3.5–5.1)
PROT SERPL-MCNC: 6.8 G/DL (ref 6.4–8.2)
SODIUM SERPL-SCNC: 139 MMOL/L (ref 136–145)
TRIGL SERPL-MCNC: 165 MG/DL (ref 0–150)
VLDLC SERPL CALC-MCNC: 33 MG/DL

## 2024-10-08 PROCEDURE — G8484 FLU IMMUNIZE NO ADMIN: HCPCS | Performed by: FAMILY MEDICINE

## 2024-10-08 PROCEDURE — 1036F TOBACCO NON-USER: CPT | Performed by: FAMILY MEDICINE

## 2024-10-08 PROCEDURE — G8417 CALC BMI ABV UP PARAM F/U: HCPCS | Performed by: FAMILY MEDICINE

## 2024-10-08 PROCEDURE — 3074F SYST BP LT 130 MM HG: CPT | Performed by: FAMILY MEDICINE

## 2024-10-08 PROCEDURE — 36415 COLL VENOUS BLD VENIPUNCTURE: CPT | Performed by: FAMILY MEDICINE

## 2024-10-08 PROCEDURE — 3079F DIAST BP 80-89 MM HG: CPT | Performed by: FAMILY MEDICINE

## 2024-10-08 PROCEDURE — 77067 SCR MAMMO BI INCL CAD: CPT

## 2024-10-08 PROCEDURE — 99214 OFFICE O/P EST MOD 30 MIN: CPT | Performed by: FAMILY MEDICINE

## 2024-10-08 PROCEDURE — 3017F COLORECTAL CA SCREEN DOC REV: CPT | Performed by: FAMILY MEDICINE

## 2024-10-08 PROCEDURE — G8427 DOCREV CUR MEDS BY ELIG CLIN: HCPCS | Performed by: FAMILY MEDICINE

## 2024-10-08 RX ORDER — FUROSEMIDE 20 MG
20 TABLET ORAL DAILY
Qty: 90 TABLET | Refills: 3 | Status: SHIPPED | OUTPATIENT
Start: 2024-10-08

## 2024-10-08 RX ORDER — SUMATRIPTAN 100 MG/1
100 TABLET, FILM COATED ORAL 2 TIMES DAILY PRN
Qty: 27 TABLET | Refills: 1 | Status: SHIPPED | OUTPATIENT
Start: 2024-10-08

## 2024-10-08 SDOH — ECONOMIC STABILITY: INCOME INSECURITY: HOW HARD IS IT FOR YOU TO PAY FOR THE VERY BASICS LIKE FOOD, HOUSING, MEDICAL CARE, AND HEATING?: NOT VERY HARD

## 2024-10-08 SDOH — ECONOMIC STABILITY: FOOD INSECURITY: WITHIN THE PAST 12 MONTHS, THE FOOD YOU BOUGHT JUST DIDN'T LAST AND YOU DIDN'T HAVE MONEY TO GET MORE.: NEVER TRUE

## 2024-10-08 SDOH — ECONOMIC STABILITY: FOOD INSECURITY: WITHIN THE PAST 12 MONTHS, YOU WORRIED THAT YOUR FOOD WOULD RUN OUT BEFORE YOU GOT MONEY TO BUY MORE.: NEVER TRUE

## 2024-10-08 ASSESSMENT — ENCOUNTER SYMPTOMS: CHEST TIGHTNESS: 0

## 2024-10-08 NOTE — PROGRESS NOTES
Patient ID: Kamla Rosenbaum 1970    .  Chief Complaint   Patient presents with    Hypertension    Edema         Hypertension  This is a chronic problem. The problem is controlled. Risk factors for coronary artery disease include obesity and post-menopausal state. Past treatments include beta blockers. The current treatment provides mild improvement.   Edema  This is a recurrent problem. Treatments tried: lasix. The treatment provided moderate relief.   Asthma  There is no chest tightness. This is a chronic problem. The current episode started more than 1 year ago. The problem occurs rarely. The problem has been unchanged. Her symptoms are alleviated by beta-agonist and steroid inhaler. Her past medical history is significant for asthma.   Headache  Headache pattern: ran out of imitrex.  took excedrin migraine which worked very well.  Hyperlipidemia  This is a chronic problem. The current episode started more than 1 year ago. Current antihyperlipidemic treatment includes statins. Compliance problems include psychosocial issues.  Risk factors for coronary artery disease include obesity.         Patient Active Problem List   Diagnosis    History of breast cancer    Hyperlipidemia    Headache    Asthma    Malignant neoplasm of right female breast (HCC)    Impaired glucose tolerance    Essential hypertension    Left foot pain    Obesity, Class III, BMI 40-49.9 (morbid obesity)    Chronic pain of left lower extremity    Right-sided chest wall pain    Right lateral epicondylitis    History of colon polyps       Past Surgical History:   Procedure Laterality Date    BREAST BIOPSY  2009    BREAST LUMPECTOMY Right 01/01/2009    COLONOSCOPY  2010    polyp.  Repeat in 2015    COLONOSCOPY  12/12/2016    diverticulosis    COLONOSCOPY N/A 07/25/2023    COLONOSCOPY DIAGNOSTIC performed by Chris Salomon MD at [facility] ENDOSCOPY    ELBOW FRACTURE SURGERY Right 12/10/2019    RIGHT LATERAL EPICONDYLITIS TENDON DEBRIDEMENT, BOSWORTH

## 2025-03-12 ENCOUNTER — OFFICE VISIT (OUTPATIENT)
Dept: FAMILY MEDICINE CLINIC | Age: 55
End: 2025-03-12
Payer: COMMERCIAL

## 2025-03-12 ENCOUNTER — RESULTS FOLLOW-UP (OUTPATIENT)
Dept: FAMILY MEDICINE CLINIC | Age: 55
End: 2025-03-12

## 2025-03-12 VITALS
OXYGEN SATURATION: 99 % | HEART RATE: 75 BPM | DIASTOLIC BLOOD PRESSURE: 80 MMHG | SYSTOLIC BLOOD PRESSURE: 122 MMHG | BODY MASS INDEX: 41.9 KG/M2 | WEIGHT: 275.6 LBS

## 2025-03-12 DIAGNOSIS — I10 ESSENTIAL HYPERTENSION: ICD-10-CM

## 2025-03-12 DIAGNOSIS — E78.5 HYPERLIPIDEMIA, UNSPECIFIED HYPERLIPIDEMIA TYPE: ICD-10-CM

## 2025-03-12 DIAGNOSIS — J45.20 MILD INTERMITTENT ASTHMA WITHOUT COMPLICATION: ICD-10-CM

## 2025-03-12 DIAGNOSIS — L81.9 DISCOLORATION OF SKIN: ICD-10-CM

## 2025-03-12 DIAGNOSIS — N63.0 BREAST NODULE: Primary | ICD-10-CM

## 2025-03-12 DIAGNOSIS — R16.0 HEPATOMEGALY: ICD-10-CM

## 2025-03-12 DIAGNOSIS — Z85.3 HISTORY OF BREAST CANCER: ICD-10-CM

## 2025-03-12 DIAGNOSIS — R73.02 IMPAIRED GLUCOSE TOLERANCE: ICD-10-CM

## 2025-03-12 LAB
ALBUMIN SERPL-MCNC: 4.2 G/DL (ref 3.4–5)
ALBUMIN/GLOB SERPL: 1.5 {RATIO} (ref 1.1–2.2)
ALP SERPL-CCNC: 96 U/L (ref 40–129)
ALT SERPL-CCNC: 24 U/L (ref 10–40)
ANION GAP SERPL CALCULATED.3IONS-SCNC: 11 MMOL/L (ref 3–16)
AST SERPL-CCNC: 22 U/L (ref 15–37)
BILIRUB SERPL-MCNC: 0.9 MG/DL (ref 0–1)
BUN SERPL-MCNC: 11 MG/DL (ref 7–20)
CALCIUM SERPL-MCNC: 9.8 MG/DL (ref 8.3–10.6)
CHLORIDE SERPL-SCNC: 105 MMOL/L (ref 99–110)
CO2 SERPL-SCNC: 27 MMOL/L (ref 21–32)
CREAT SERPL-MCNC: 0.8 MG/DL (ref 0.6–1.1)
DEPRECATED RDW RBC AUTO: 13.4 % (ref 12.4–15.4)
EST. AVERAGE GLUCOSE BLD GHB EST-MCNC: 119.8 MG/DL
FERRITIN SERPL IA-MCNC: 41.7 NG/ML (ref 15–150)
GFR SERPLBLD CREATININE-BSD FMLA CKD-EPI: 87 ML/MIN/{1.73_M2}
GLUCOSE SERPL-MCNC: 99 MG/DL (ref 70–99)
HAV IGM SERPL QL IA: NORMAL
HBA1C MFR BLD: 5.8 %
HBV CORE IGM SERPL QL IA: NORMAL
HBV SURFACE AG SERPL QL IA: NORMAL
HCT VFR BLD AUTO: 41.2 % (ref 36–48)
HCV AB SERPL QL IA: NORMAL
HGB BLD-MCNC: 13.6 G/DL (ref 12–16)
MCH RBC QN AUTO: 29.1 PG (ref 26–34)
MCHC RBC AUTO-ENTMCNC: 33.1 G/DL (ref 31–36)
MCV RBC AUTO: 88 FL (ref 80–100)
PLATELET # BLD AUTO: 209 K/UL (ref 135–450)
PMV BLD AUTO: 9.6 FL (ref 5–10.5)
POTASSIUM SERPL-SCNC: 3.7 MMOL/L (ref 3.5–5.1)
PROT SERPL-MCNC: 7 G/DL (ref 6.4–8.2)
RBC # BLD AUTO: 4.68 M/UL (ref 4–5.2)
SODIUM SERPL-SCNC: 143 MMOL/L (ref 136–145)
WBC # BLD AUTO: 4.8 K/UL (ref 4–11)

## 2025-03-12 PROCEDURE — 36415 COLL VENOUS BLD VENIPUNCTURE: CPT | Performed by: FAMILY MEDICINE

## 2025-03-12 PROCEDURE — 3074F SYST BP LT 130 MM HG: CPT | Performed by: FAMILY MEDICINE

## 2025-03-12 PROCEDURE — 3017F COLORECTAL CA SCREEN DOC REV: CPT | Performed by: FAMILY MEDICINE

## 2025-03-12 PROCEDURE — 99214 OFFICE O/P EST MOD 30 MIN: CPT | Performed by: FAMILY MEDICINE

## 2025-03-12 PROCEDURE — G8427 DOCREV CUR MEDS BY ELIG CLIN: HCPCS | Performed by: FAMILY MEDICINE

## 2025-03-12 PROCEDURE — 3079F DIAST BP 80-89 MM HG: CPT | Performed by: FAMILY MEDICINE

## 2025-03-12 RX ORDER — METOPROLOL SUCCINATE 100 MG/1
100 TABLET, EXTENDED RELEASE ORAL DAILY
Qty: 90 TABLET | Refills: 0 | Status: SHIPPED | OUTPATIENT
Start: 2025-03-12

## 2025-03-12 RX ORDER — ALBUTEROL SULFATE 90 UG/1
2 INHALANT RESPIRATORY (INHALATION) EVERY 4 HOURS PRN
Qty: 18 G | Refills: 1 | Status: SHIPPED | OUTPATIENT
Start: 2025-03-12

## 2025-03-12 SDOH — ECONOMIC STABILITY: FOOD INSECURITY: WITHIN THE PAST 12 MONTHS, THE FOOD YOU BOUGHT JUST DIDN'T LAST AND YOU DIDN'T HAVE MONEY TO GET MORE.: NEVER TRUE

## 2025-03-12 SDOH — ECONOMIC STABILITY: FOOD INSECURITY: WITHIN THE PAST 12 MONTHS, YOU WORRIED THAT YOUR FOOD WOULD RUN OUT BEFORE YOU GOT MONEY TO BUY MORE.: NEVER TRUE

## 2025-03-12 NOTE — PROGRESS NOTES
Patient ID: Kamla Rosenbaum 1970    Chief Complaint   Patient presents with    Other     ED F/U     Abdominal Pain     Right side            History of Present Illness  The patient is a 54-year-old female who presents for follow-up from the emergency room for abdominal pain and an abnormal CT scan.    Abdominal Pain and Abnormal CT Scan  - Sought initial medical attention at an urgent care facility on Friday night  - Referred to the emergency room due to a suspected kidney stone  - Diagnostic imaging, including x-ray, ultrasound, and CT scan, was performed  - CT scan revealed a breast nodule, prompting further investigation.  Also showed enlarged liver  - History of breast cancer and is concerned about the nodule  - Experiencing intermittent swelling and soreness in her breasts, attributed to scar tissue  - Reports no unusual skin itching or yellowing of the skin or eyes  - Muscle relaxer was prescribed, found beneficial, and only required a single dose    Diverticulosis and Polyps  - Diagnosed with diverticulosis  - Has had polyps removed in the past    Skin Patches  - Observed small white patches on her left and right upper arm, mildly pruritic, and tend to resolve spontaneously  - Similar patches have appeared on her feet, initially white but later turning brown    Swelling in Right Axilla  - Occasionally experiences swelling in her right axilla  - Manages with over-the-counter analgesics such as ibuprofen or Tylenol    Migraine Management  - Has not required the use of Imitrex for an extended period    Asthma and Dyspnea  - Has not experienced any recent asthma exacerbations  - Reports dyspnea during physical activity    Supplemental information: None    SOCIAL HISTORY  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    MEDICATIONS  Current: metoprolol, Lipitor, Lasix, Imitrex, Qvar      Patient Active Problem List   Diagnosis    History of breast cancer    Hyperlipidemia    Headache    Asthma    Malignant neoplasm of

## 2025-03-13 LAB — ANA SER QL IA: NEGATIVE

## 2025-04-21 ENCOUNTER — HOSPITAL ENCOUNTER (OUTPATIENT)
Dept: WOMENS IMAGING | Age: 55
Discharge: HOME | End: 2025-04-21
Attending: FAMILY MEDICINE
Payer: COMMERCIAL

## 2025-04-21 ENCOUNTER — HOSPITAL ENCOUNTER (OUTPATIENT)
Dept: ULTRASOUND IMAGING | Age: 55
Discharge: HOME | End: 2025-04-21
Attending: FAMILY MEDICINE
Payer: COMMERCIAL

## 2025-04-21 DIAGNOSIS — Z85.3 HISTORY OF BREAST CANCER: ICD-10-CM

## 2025-04-21 DIAGNOSIS — R16.0 HEPATOMEGALY: ICD-10-CM

## 2025-04-21 DIAGNOSIS — N63.0 BREAST NODULE: ICD-10-CM

## 2025-04-21 PROCEDURE — 77066 DX MAMMO INCL CAD BI: CPT

## 2025-04-21 PROCEDURE — 76705 ECHO EXAM OF ABDOMEN: CPT

## 2025-04-21 PROCEDURE — 76642 ULTRASOUND BREAST LIMITED: CPT

## 2025-04-22 ENCOUNTER — PATIENT MESSAGE (OUTPATIENT)
Dept: FAMILY MEDICINE CLINIC | Age: 55
End: 2025-04-22

## 2025-04-22 ENCOUNTER — RESULTS FOLLOW-UP (OUTPATIENT)
Dept: FAMILY MEDICINE CLINIC | Age: 55
End: 2025-04-22

## 2025-04-23 ENCOUNTER — OFFICE VISIT (OUTPATIENT)
Dept: FAMILY MEDICINE CLINIC | Age: 55
End: 2025-04-23
Payer: COMMERCIAL

## 2025-04-23 VITALS
HEART RATE: 80 BPM | OXYGEN SATURATION: 97 % | SYSTOLIC BLOOD PRESSURE: 120 MMHG | BODY MASS INDEX: 41.66 KG/M2 | WEIGHT: 274 LBS | DIASTOLIC BLOOD PRESSURE: 76 MMHG

## 2025-04-23 DIAGNOSIS — R14.3 FLATULENCE: ICD-10-CM

## 2025-04-23 DIAGNOSIS — K58.0 IRRITABLE BOWEL SYNDROME WITH DIARRHEA: Primary | ICD-10-CM

## 2025-04-23 LAB — IGA SERPL-MCNC: 234 MG/DL (ref 70–400)

## 2025-04-23 PROCEDURE — 3078F DIAST BP <80 MM HG: CPT | Performed by: FAMILY MEDICINE

## 2025-04-23 PROCEDURE — G8427 DOCREV CUR MEDS BY ELIG CLIN: HCPCS | Performed by: FAMILY MEDICINE

## 2025-04-23 PROCEDURE — 3017F COLORECTAL CA SCREEN DOC REV: CPT | Performed by: FAMILY MEDICINE

## 2025-04-23 PROCEDURE — 99213 OFFICE O/P EST LOW 20 MIN: CPT | Performed by: FAMILY MEDICINE

## 2025-04-23 PROCEDURE — G8417 CALC BMI ABV UP PARAM F/U: HCPCS | Performed by: FAMILY MEDICINE

## 2025-04-23 PROCEDURE — 3074F SYST BP LT 130 MM HG: CPT | Performed by: FAMILY MEDICINE

## 2025-04-23 PROCEDURE — 1036F TOBACCO NON-USER: CPT | Performed by: FAMILY MEDICINE

## 2025-04-23 RX ORDER — DICYCLOMINE HCL 20 MG
20 TABLET ORAL 4 TIMES DAILY
Qty: 60 TABLET | Refills: 0 | Status: SHIPPED | OUTPATIENT
Start: 2025-04-23

## 2025-04-23 NOTE — PATIENT INSTRUCTIONS
BRING YOUR MEDICATION BOTTLES TO ALL APPOINTMENTS    Check MY CHART for test results.  If you have forgotten your password, call [phone].  The diagnoses and medications listed in this after visit summary may not be up to date.  Check MY CHART in 28-48 hours for corrections.      Late cancellation policy: So that we can better accommodate people who are sick, please give our office 24 hour notice for an appointment cancellation.    Missed appointments: Your care is very important to us.  It is important that you keep your scheduled appointments.   Multiple missed appointments will lead to a dismissal from the office.    Patients arriving late will be worked into the schedule as time permits, with patients arriving on time taken as scheduled. Late arriving patients are more than welcome to wait or reschedule their appointments.    Please allow 5-7 business days for paperwork to be processed.

## 2025-04-23 NOTE — PROGRESS NOTES
Breath sounds: Normal breath sounds. No wheezing.   Abdominal:      General: Bowel sounds are decreased. There is no distension.      Palpations: Abdomen is soft. There is no mass.      Tenderness: There is no abdominal tenderness.   Musculoskeletal:      Cervical back: Neck supple.   Skin:     General: Skin is warm and dry.   Neurological:      Mental Status: She is alert.           Vitals:    04/23/25 0700   BP: 120/76   BP Site: Left Upper Arm   Patient Position: Sitting   BP Cuff Size: Large Adult   Pulse: 80   SpO2: 97%   Weight: 124.3 kg (274 lb)     Body mass index is 41.66 kg/m².     Wt Readings from Last 3 Encounters:   04/23/25 124.3 kg (274 lb)   03/12/25 125 kg (275 lb 9.6 oz)   10/08/24 122.5 kg (270 lb)     BP Readings from Last 3 Encounters:   04/23/25 120/76   03/12/25 122/80   10/08/24 122/84          No results found for this visit on 04/23/25.  Results  Labs: Electrolytes normal (03/2025). Imaging: Abdominal ultrasound showed slightly enlarged liver, no nodule.          Assessment:         Diagnosis Orders   1. Irritable bowel syndrome with diarrhea  dicyclomine (BENTYL) 20 MG tablet    Celiac Screen with Reflex (Ruby Alvarado)      2. Flatulence            Assessment & Plan  Irritable Bowel Syndrome (IBS)  - Symptoms: constant gassiness, bloating, loose stools, no significant diet changes or weight loss  - Physical exam: slightly enlarged liver on ultrasound, normal electrolytes (03/2025)  - Reviewed colonoscopy (07/2023), considered differential diagnoses including celiac disease  - Prescribed Bentyl (dicyclomine) up to four times daily  - Recommended probiotics (Culturelle or yogurt)  - Advised FODMAP diet  - Ordered celiac disease test  - Follow-up in 3 weeks        Plan:          Return in about 3 weeks (around 5/14/2025) for IBS.         The patient (or guardian, if applicable) and other individuals in attendance with the patient were advised that Artificial Intelligence will be utilized

## 2025-04-24 ENCOUNTER — RESULTS FOLLOW-UP (OUTPATIENT)
Dept: FAMILY MEDICINE CLINIC | Age: 55
End: 2025-04-24

## 2025-04-24 LAB — TISSUE TRANSGLUTAMINASE IGA: <0.5 U/ML (ref 0–14)

## 2025-05-06 ENCOUNTER — OFFICE VISIT (OUTPATIENT)
Dept: FAMILY MEDICINE CLINIC | Age: 55
End: 2025-05-06
Payer: COMMERCIAL

## 2025-05-06 ENCOUNTER — PATIENT MESSAGE (OUTPATIENT)
Dept: FAMILY MEDICINE CLINIC | Age: 55
End: 2025-05-06

## 2025-05-06 VITALS
BODY MASS INDEX: 41.37 KG/M2 | HEART RATE: 54 BPM | DIASTOLIC BLOOD PRESSURE: 84 MMHG | TEMPERATURE: 97.9 F | HEIGHT: 68 IN | SYSTOLIC BLOOD PRESSURE: 126 MMHG | OXYGEN SATURATION: 97 % | WEIGHT: 273 LBS

## 2025-05-06 DIAGNOSIS — R00.2 HEART PALPITATIONS: Primary | ICD-10-CM

## 2025-05-06 DIAGNOSIS — R42 DIZZINESS: ICD-10-CM

## 2025-05-06 LAB
ALBUMIN SERPL-MCNC: 4.3 G/DL (ref 3.4–5)
ALBUMIN/GLOB SERPL: 1.6 {RATIO} (ref 1.1–2.2)
ALP SERPL-CCNC: 91 U/L (ref 40–129)
ALT SERPL-CCNC: 26 U/L (ref 10–40)
ANION GAP SERPL CALCULATED.3IONS-SCNC: 9 MMOL/L (ref 3–16)
AST SERPL-CCNC: 25 U/L (ref 15–37)
BASOPHILS # BLD: 0.1 K/UL (ref 0–0.2)
BASOPHILS NFR BLD: 1 %
BILIRUB SERPL-MCNC: 1 MG/DL (ref 0–1)
BUN SERPL-MCNC: 9 MG/DL (ref 7–20)
CALCIUM SERPL-MCNC: 9.6 MG/DL (ref 8.3–10.6)
CHLORIDE SERPL-SCNC: 104 MMOL/L (ref 99–110)
CO2 SERPL-SCNC: 29 MMOL/L (ref 21–32)
CREAT SERPL-MCNC: 0.8 MG/DL (ref 0.6–1.1)
DEPRECATED RDW RBC AUTO: 13.6 % (ref 12.4–15.4)
EOSINOPHIL # BLD: 0.1 K/UL (ref 0–0.6)
EOSINOPHIL NFR BLD: 1.5 %
GFR SERPLBLD CREATININE-BSD FMLA CKD-EPI: 87 ML/MIN/{1.73_M2}
GLUCOSE SERPL-MCNC: 100 MG/DL (ref 70–99)
HCT VFR BLD AUTO: 38.3 % (ref 36–48)
HGB BLD-MCNC: 13.2 G/DL (ref 12–16)
LYMPHOCYTES # BLD: 2.8 K/UL (ref 1–5.1)
LYMPHOCYTES NFR BLD: 46.2 %
MCH RBC QN AUTO: 29.7 PG (ref 26–34)
MCHC RBC AUTO-ENTMCNC: 34.5 G/DL (ref 31–36)
MCV RBC AUTO: 86 FL (ref 80–100)
MONOCYTES # BLD: 0.5 K/UL (ref 0–1.3)
MONOCYTES NFR BLD: 8.6 %
NEUTROPHILS # BLD: 2.6 K/UL (ref 1.7–7.7)
NEUTROPHILS NFR BLD: 42.7 %
PLATELET # BLD AUTO: 226 K/UL (ref 135–450)
PLATELET BLD QL SMEAR: ADEQUATE
PMV BLD AUTO: 9.5 FL (ref 5–10.5)
POTASSIUM SERPL-SCNC: 4.4 MMOL/L (ref 3.5–5.1)
PROT SERPL-MCNC: 7 G/DL (ref 6.4–8.2)
RBC # BLD AUTO: 4.45 M/UL (ref 4–5.2)
SLIDE REVIEW: NORMAL
SODIUM SERPL-SCNC: 142 MMOL/L (ref 136–145)
TSH SERPL DL<=0.005 MIU/L-ACNC: 0.92 UIU/ML (ref 0.27–4.2)
WBC # BLD AUTO: 6.2 K/UL (ref 4–11)

## 2025-05-06 PROCEDURE — 3079F DIAST BP 80-89 MM HG: CPT | Performed by: FAMILY MEDICINE

## 2025-05-06 PROCEDURE — 3017F COLORECTAL CA SCREEN DOC REV: CPT | Performed by: FAMILY MEDICINE

## 2025-05-06 PROCEDURE — G8427 DOCREV CUR MEDS BY ELIG CLIN: HCPCS | Performed by: FAMILY MEDICINE

## 2025-05-06 PROCEDURE — 36415 COLL VENOUS BLD VENIPUNCTURE: CPT | Performed by: FAMILY MEDICINE

## 2025-05-06 PROCEDURE — G8417 CALC BMI ABV UP PARAM F/U: HCPCS | Performed by: FAMILY MEDICINE

## 2025-05-06 PROCEDURE — 99214 OFFICE O/P EST MOD 30 MIN: CPT | Performed by: FAMILY MEDICINE

## 2025-05-06 PROCEDURE — 1036F TOBACCO NON-USER: CPT | Performed by: FAMILY MEDICINE

## 2025-05-06 PROCEDURE — 93000 ELECTROCARDIOGRAM COMPLETE: CPT | Performed by: FAMILY MEDICINE

## 2025-05-06 PROCEDURE — 3074F SYST BP LT 130 MM HG: CPT | Performed by: FAMILY MEDICINE

## 2025-05-06 NOTE — PROGRESS NOTES
Patient ID: Kamla Rosenbaum 1970    Chief Complaint   Patient presents with    Palpitations     Started last night getting ready for bed     Dizziness       HPI     History of Present Illness  The patient is a 54-year-old female presenting with heart palpitations.    Heart Palpitations  - Experienced palpitations last night, persisting throughout the day, causing severe discomfort and difficulty sleeping.  - Changing positions and deep breathing provided temporary relief.  - Upon awakening, palpitations resumed with a sensation of impending syncope.  - Another brief episode of palpitations this morning lasted 30 seconds, followed by dizziness.    Supplemental information: She abstains from decongestants, allergy medications, and caffeine.    SOCIAL HISTORY  Quit caffeine.      Patient Active Problem List   Diagnosis    History of breast cancer    Hyperlipidemia    Headache    Asthma    Malignant neoplasm of right female breast (HCC)    Impaired glucose tolerance    Essential hypertension    Left foot pain    Obesity, Class III, BMI 40-49.9 (morbid obesity) (HCC)    Chronic pain of left lower extremity    Right-sided chest wall pain    Right lateral epicondylitis    History of colon polyps       Past Surgical History:   Procedure Laterality Date    BREAST BIOPSY  2009    BREAST LUMPECTOMY Right 2009    COLONOSCOPY      polyp.  Repeat in     COLONOSCOPY  2016    diverticulosis    COLONOSCOPY N/A 2023    COLONOSCOPY DIAGNOSTIC performed by Chris Salomon MD at [facility] ENDOSCOPY    ELBOW FRACTURE SURGERY Right 12/10/2019    RIGHT LATERAL EPICONDYLITIS TENDON DEBRIDEMENT, BOSWORTH PROCEDURE performed by Chris Cleveland DO at [facility] OR    HYSTERECTOMY (CERVIX STATUS UNKNOWN)      Cysts, Endometriosis.  1 ovary remains    OVARY REMOVAL Left     TONSILLECTOMY AND ADENOIDECTOMY      TUBAL LIGATION         Family History   Problem Relation Age of Onset    Stroke Mother 67         in

## 2025-05-06 NOTE — TELEPHONE ENCOUNTER
Called pt to get them in asap per dr saldivar- pt is at work and had something they needed to get done before coming in - got pt schedled for 1045 today

## 2025-05-06 NOTE — PATIENT INSTRUCTIONS
BRING YOUR MEDICATION BOTTLES TO ALL APPOINTMENTS    Check MY CHART for test results.  If you have forgotten your password, call [phone].  The diagnoses and medications listed in this after visit summary may not be up to date.  Check MY CHART in 28-48 hours for corrections.      Late cancellation policy: So that we can better accommodate people who are sick, please give our office 24 hour notice for an appointment cancellation.    Missed appointments: Your care is very important to us.  It is important that you keep your scheduled appointments.   Multiple missed appointments will lead to a dismissal from the office.    Patients arriving late will be worked into the schedule as time permits, with patients arriving on time taken as scheduled. Late arriving patients are more than welcome to wait or reschedule their appointments.    Please allow 5-7 business days for paperwork to be processed.

## 2025-05-07 ENCOUNTER — TELEPHONE (OUTPATIENT)
Dept: CARDIOLOGY CLINIC | Age: 55
End: 2025-05-07

## 2025-05-07 ENCOUNTER — PATIENT MESSAGE (OUTPATIENT)
Dept: CARDIOLOGY CLINIC | Age: 55
End: 2025-05-07

## 2025-05-07 NOTE — TELEPHONE ENCOUNTER
Left a Vm for pt to call back and schedule a consult with the first available provider.     Referral by Carola Benitez  Dx: Heart palpitations and Dizziness.     First attempt at contacting patient   My chart message sent.

## 2025-05-07 NOTE — PROGRESS NOTES
Patient Name: Kamla Rosenbaum  : 1970  MRN# [id number]    REASON FOR VISIT:  CONSULT  Patient Active Problem List    Diagnosis Date Noted    Impaired glucose tolerance 2016    Essential hypertension 2016    Asthma     Hyperlipidemia 10/01/2012    History of breast cancer 2012    History of colon polyps 2023    Right lateral epicondylitis     Right-sided chest wall pain 2019    Chronic pain of left lower extremity 09/10/2018    Obesity, Class III, BMI 40-49.9 (morbid obesity) (HCC) 2018    Left foot pain 10/14/2016    Malignant neoplasm of right female breast (HCC) 10/14/2014    Headache 2013       LABS:  Lab Results   Component Value Date    CHOL 201 (H) 10/08/2024    TRIG 165 (H) 10/08/2024    HDL 52 10/08/2024    LDLDIRECT 221 (H) 2017    TSH 0.86 2023     Hemoglobin A1C   Date Value Ref Range Status   2025 5.8 See comment % Final     Comment:     Comment:  Diagnosis of Diabetes: > or = 6.5%  Increased risk of diabetes (Prediabetes): 5.7-6.4%  Glycemic Control: Nonpregnant Adults: <7.0%                    Pregnant: <6.0%

## 2025-05-08 ENCOUNTER — RESULTS FOLLOW-UP (OUTPATIENT)
Dept: FAMILY MEDICINE CLINIC | Age: 55
End: 2025-05-08

## 2025-05-09 ENCOUNTER — INITIAL CONSULT (OUTPATIENT)
Dept: CARDIOLOGY CLINIC | Age: 55
End: 2025-05-09
Payer: COMMERCIAL

## 2025-05-09 VITALS
WEIGHT: 274 LBS | HEART RATE: 61 BPM | SYSTOLIC BLOOD PRESSURE: 134 MMHG | BODY MASS INDEX: 41.52 KG/M2 | HEIGHT: 68 IN | DIASTOLIC BLOOD PRESSURE: 80 MMHG

## 2025-05-09 DIAGNOSIS — E78.5 HYPERLIPIDEMIA, UNSPECIFIED HYPERLIPIDEMIA TYPE: ICD-10-CM

## 2025-05-09 DIAGNOSIS — R42 DIZZINESS: ICD-10-CM

## 2025-05-09 DIAGNOSIS — I10 ESSENTIAL HYPERTENSION: ICD-10-CM

## 2025-05-09 DIAGNOSIS — R00.2 PALPITATIONS: Primary | ICD-10-CM

## 2025-05-09 DIAGNOSIS — E66.813 OBESITY, CLASS III, BMI 40-49.9 (MORBID OBESITY) (HCC): ICD-10-CM

## 2025-05-09 PROCEDURE — 93000 ELECTROCARDIOGRAM COMPLETE: CPT | Performed by: INTERNAL MEDICINE

## 2025-05-09 PROCEDURE — G8427 DOCREV CUR MEDS BY ELIG CLIN: HCPCS | Performed by: INTERNAL MEDICINE

## 2025-05-09 PROCEDURE — 1036F TOBACCO NON-USER: CPT | Performed by: INTERNAL MEDICINE

## 2025-05-09 PROCEDURE — 3017F COLORECTAL CA SCREEN DOC REV: CPT | Performed by: INTERNAL MEDICINE

## 2025-05-09 PROCEDURE — G8417 CALC BMI ABV UP PARAM F/U: HCPCS | Performed by: INTERNAL MEDICINE

## 2025-05-09 PROCEDURE — 99204 OFFICE O/P NEW MOD 45 MIN: CPT | Performed by: INTERNAL MEDICINE

## 2025-05-09 PROCEDURE — 3074F SYST BP LT 130 MM HG: CPT | Performed by: INTERNAL MEDICINE

## 2025-05-09 PROCEDURE — 3079F DIAST BP 80-89 MM HG: CPT | Performed by: INTERNAL MEDICINE

## 2025-05-09 NOTE — PATIENT INSTRUCTIONS
Thank you for allowing us to care for you today!   We want to ensure we can follow your treatment plan and we strive to give you the best outcomes and experience possible.   If you ever have a life threatening emergency and call 911 - for an ambulance (EMS)  REMEMBER  Our providers can only care for you at:   [facility] or [facility]   Even if you have someone take you or you drive yourself we can only care for you in a [facility] facility. Our providers are not setup at the other healthcare locations!    PLEASE CALL OUR OFFICE DURING NORMAL BUSINESS HOURS  Monday through Friday 8 am to 5 pm  AFTER HOURS the physician on-call cannot help with scheduling, rescheduling, procedure instruction questions or any type of medication need or issue.  [facility] P:[phone] - [facility] P:[phone] - [facility] P:[phone]      If you receive a survey:  We would appreciate you taking the time to share your experience concerning your provider visit in the office.    These surveys are confidential!  We are eager to improve and are counting on you to share your feedback so we can ensure you get the best care possible.    PALPITATIONS: Has had this problem for a long time she has been evaluated with monitor this on multiple occasions but recently had prolonged episodes 3 days in a row.  Since that she did not reveal any significant arrhythmias on the monitors I suggest a loop recorder for documentation.  Hence I will refer her to Dr. Dumont for the same.  Will check an echocardiographic study also because she did not had an echo for more than 13 years    HYPERTENSION: Blood pressure is controlled she is taking Toprol- mg daily.  Patient to continue the same for now.    DYSLIPIDEMIA: Patient is being treated with high-dose statin therapy as of now.    I spent about 30 min. of time in review of the available data, chart Prep., interviewing patient,

## 2025-05-09 NOTE — PROGRESS NOTES
CLINICAL STAFF DOCUMENTATION    Dr. Steve Rosenbaum  1970  [id number]    Have you had any Chest Pain recently? - No    Have you had any Shortness of Breath -  Somewhat, pt states she has not had severe difficulty with SOB  When did it begin? - Years , has inhaler  If Yes - When on exertion  How many flights of stairs can you go up without having SOB? -  Feels somewhat SOB when walking up 1 flight of stairs in home  How many pillows do you sleep with under your head? - 1    Have you had any dizziness - Yes, pt states she gets palpitations and dizziness when standing  If Yes DO ORTHOSTATIC BP including pulse  When do you feel dizzy? with position change  Does the room spin?  Lightheaded can't tell if the room is spinning  How long does it last .10  seconds     Have patient lie down for 5 minutes then measure blood pressure and pulse rate.   Have the patient stand.   Repeat blood pressure and pulse rate after patient has been standing for 1 minute   Repeat blood pressure and pulse rate after patient has been standing for 3 minutes.   Be sure to ask what symptoms they are having if they get dizzy while completing ortho stats such as: room spinning, nausea, etc.    Have you had any palpitations recently? - Yes  When did they begin? One week ago, past weekend 5/03/25-5/5/25. Feels like it is a pounding heart and fluttering sensation in the morning.  How often do they occur? In the past week, they have happened 3 days in a row  How long do they last? Worst episode was about 30 minutes  Any aggravating features? None known, was exercising 3 hrs prior  Any relieving features? None  Any Associated features? Shaky and dizzy/lightheaded  Any Thyroid issues? None known  How much caffeine? None    Do you have any edema - swelling in No      When did you have your last labs drawn 5/6/2025  Do we have the labs in their chart Yes      If we do not have these labs, you are retrieve these labs for the 
29 05/06/2025 11:25 AM    CO2 27 03/12/2025 08:10 AM    BUN 9 05/06/2025 11:25 AM    BUN 11 03/12/2025 08:10 AM    CREATININE 0.8 05/06/2025 11:25 AM    CREATININE 0.8 03/12/2025 08:10 AM     TSH:    Lab Results   Component Value Date    TSH 0.86 06/07/2023       Echo:  Last 2012    Stress Cardiolyte:  7/29/2021   No EKG changes suggestive of ischemia with Lexiscan infusion.   Fixed defect in the anterior wall, due to breast artifact -normal perfusion   uptake in the septal/lateral and inferior wall   gating shows EF 49%   No reversible ischemia noted    EKG: sinus bradycardia 54/min otherwise normal EKG       QUALITY MEASURES REVIEWED:  1.CAD:Patient is taking anti platelet agent:No  Patient does not have Hx of documented CAD  2.DYSLIPIDEMIA: Patient is on cholesterol lowering medication:Yes   3.Beta-Blocker therapy for CAD, if prior Myocardial Infarction:Yes   4.Counselled regarding smoking cessation. No   Patient does not Smoke.  5.Anticoagulation therapy (for A.Fib) No   Does Not have A.Fib.  6.Discussed weight management strategies.      Assessment, Recommendations & Tests:     PALPITATIONS: Has had this problem for a long time she has been evaluated with monitor this on multiple occasions but recently had prolonged episodes 3 days in a row.  Since that she did not reveal any significant arrhythmias on the monitors I suggest a loop recorder for documentation.  Hence I will refer her to Dr. Dumont for the same.  Will check an echocardiographic study also because she did not had an echo for more than 13 years    HYPERTENSION: Blood pressure is controlled she is taking Toprol- mg daily.  Patient to continue the same for now.    DYSLIPIDEMIA: Patient is being treated with high-dose statin therapy as of now.    I spent about 30 min. of time in review of the available data, chart Prep., interviewing patient, obtaining history, performing physical exam, going through decision making analysis for assessment &

## 2025-05-13 ENCOUNTER — OFFICE VISIT (OUTPATIENT)
Dept: FAMILY MEDICINE CLINIC | Age: 55
End: 2025-05-13
Payer: COMMERCIAL

## 2025-05-13 VITALS
WEIGHT: 274 LBS | HEIGHT: 68 IN | SYSTOLIC BLOOD PRESSURE: 126 MMHG | DIASTOLIC BLOOD PRESSURE: 80 MMHG | BODY MASS INDEX: 41.52 KG/M2 | HEART RATE: 66 BPM | OXYGEN SATURATION: 97 %

## 2025-05-13 DIAGNOSIS — R14.3 FLATULENCE: ICD-10-CM

## 2025-05-13 DIAGNOSIS — K58.0 IRRITABLE BOWEL SYNDROME WITH DIARRHEA: Primary | ICD-10-CM

## 2025-05-13 DIAGNOSIS — Z91.09 SENSITIVITY TO SUNLIGHT: ICD-10-CM

## 2025-05-13 PROCEDURE — 3074F SYST BP LT 130 MM HG: CPT | Performed by: FAMILY MEDICINE

## 2025-05-13 PROCEDURE — 3017F COLORECTAL CA SCREEN DOC REV: CPT | Performed by: FAMILY MEDICINE

## 2025-05-13 PROCEDURE — 1036F TOBACCO NON-USER: CPT | Performed by: FAMILY MEDICINE

## 2025-05-13 PROCEDURE — G8417 CALC BMI ABV UP PARAM F/U: HCPCS | Performed by: FAMILY MEDICINE

## 2025-05-13 PROCEDURE — G8427 DOCREV CUR MEDS BY ELIG CLIN: HCPCS | Performed by: FAMILY MEDICINE

## 2025-05-13 PROCEDURE — 3079F DIAST BP 80-89 MM HG: CPT | Performed by: FAMILY MEDICINE

## 2025-05-13 PROCEDURE — 99213 OFFICE O/P EST LOW 20 MIN: CPT | Performed by: FAMILY MEDICINE

## 2025-05-13 NOTE — PATIENT INSTRUCTIONS
BRING YOUR MEDICATION BOTTLES TO ALL APPOINTMENTS    Check MY CHART for test results.  If you have forgotten your password, call [phone].  The diagnoses and medications listed in this after visit summary may not be up to date.  Check MY CHART in 28-48 hours for corrections.      Late cancellation policy: So that we can better accommodate people who are sick, please give our office 24 hour notice for an appointment cancellation.    Missed appointments: Your care is very important to us.  It is important that you keep your scheduled appointments.   Multiple missed appointments will lead to a dismissal from the office.    Patients arriving late will be worked into the schedule as time permits, with patients arriving on time taken as scheduled. Late arriving patients are more than welcome to wait or reschedule their appointments.    Please allow 5-7 business days for paperwork to be processed.

## 2025-05-13 NOTE — PROGRESS NOTES
Vitals:    05/13/25 0707   BP: 126/80   BP Site: Left Upper Arm   Patient Position: Sitting   BP Cuff Size: Large Adult   Pulse: 66   SpO2: 97%   Weight: 124.3 kg (274 lb)   Height: 1.727 m (5' 8\")     Body mass index is 41.66 kg/m².     Wt Readings from Last 3 Encounters:   05/13/25 124.3 kg (274 lb)   05/09/25 124.3 kg (274 lb)   05/06/25 123.8 kg (273 lb)     BP Readings from Last 3 Encounters:   05/13/25 126/80   05/09/25 134/80   05/06/25 126/84          No results found for this visit on 05/13/25.  Results            Assessment:         Diagnosis Orders   1. Irritable bowel syndrome with diarrhea        2. Sensitivity to sunlight        3. Flatulence            Assessment & Plan  Irritable Bowel Syndrome (IBS)  - Persistent gas and loose stools with certain foods, despite Bentyl and gas relief medications  - No gluten intolerance or celiac disease  - Try probiotics for 2 weeks and resume Bentyl regularly  - Provide information on irritable bowel diet  - Consider gastroenterologist referral if no improvement    Heart palpitations  - Cardiologist recommended loop recorder for up to 18 months  - Echocardiogram ordered  - Awaiting further instructions from EP  office    Rash  - Rash on neck, forehead, nose, and hands, likely due to sun exposure  - Use sunscreen, wear long-sleeve shirts, and consider Claritin or Allegra if planning sun exposure  - Switch from Zyrtec to Allegra to avoid drowsiness        Plan:          Return if symptoms worsen or fail to improve.         The patient (or guardian, if applicable) and other individuals in attendance with the patient were advised that Artificial Intelligence will be utilized during this visit to record, process the conversation to generate a clinical note, and support improvement of the AI technology. The patient (or guardian, if applicable) and other individuals in attendance at the appointment consented to the use of AI, including the recording.

## 2025-05-14 ENCOUNTER — TELEPHONE (OUTPATIENT)
Age: 55
End: 2025-05-14

## 2025-05-14 NOTE — TELEPHONE ENCOUNTER
Received referral from Dr. Watkins to schedule consult for Patient with Dr. Dumont to discuss possible Loop Recorder.   Spoke with Patient and scheduled consult with Dr. Dumont on 6/4/25 @ 400pm.    Patient advised understanding.

## 2025-06-04 ENCOUNTER — INITIAL CONSULT (OUTPATIENT)
Age: 55
End: 2025-06-04

## 2025-06-04 VITALS
DIASTOLIC BLOOD PRESSURE: 82 MMHG | BODY MASS INDEX: 40.95 KG/M2 | HEART RATE: 65 BPM | HEIGHT: 68 IN | WEIGHT: 270.22 LBS | SYSTOLIC BLOOD PRESSURE: 128 MMHG

## 2025-06-04 DIAGNOSIS — R00.2 PALPITATIONS: Primary | ICD-10-CM

## 2025-06-04 NOTE — PROGRESS NOTES
Electrophysiology Consult Note      Reason for consultation: Palpitations    Chief complaint : Palpitations    Referring physician:       Primary care physician: Carola Benitez MD      History of Present Illness:     History of Present Illness    The patient presents for evaluation of palpitations.    She reports experiencing daily palpitations, described as brief episodes of fluttering sensations. These episodes occur during various activities such as sitting, standing, eating, or even while in bed. She has been attempting to monitor the frequency and triggers of these episodes but has not identified any specific patterns. A Holter monitor has not been utilized for further investigation.  Palpitations off and on for a month or more, every day. No change since appointment with Dr Watkins   Dizziness off and on positionally, no change   Roland consult for possible loop. Patient denies chest pain, sob, and edema. Patient denies tobacco, alcohol. Patient drinks occ green tea            Pastmedical history:   Past Medical History:   Diagnosis Date    Arthritis     in right elbow    Asthma     last exacerbation 2014    Breast cancer (HCC)     Colon polyp 2010    Depression     Diverticulosis     colonoscopy 2016    Endometriosis     History of breast cancer 2009    lumpectomy r side chemo and radiation     History of therapeutic radiation     HTN (hypertension) 09/18/2013    Hx antineoplastic chemo     Hyperlipidemia 10/2012    Migraines 11/15/2019    LAST MIGRAINE 11/15/19    Ovarian cyst     Pre-diabetes     no meds    Seasonal allergies     Uterine fibroid        Surgical history :   Past Surgical History:   Procedure Laterality Date    BREAST BIOPSY  2009    BREAST LUMPECTOMY Right 01/01/2009    COLONOSCOPY  2010    polyp.  Repeat in 2015    COLONOSCOPY  12/12/2016    diverticulosis    COLONOSCOPY N/A 07/25/2023    COLONOSCOPY DIAGNOSTIC performed by Chris KIDD

## 2025-06-10 ENCOUNTER — OFFICE VISIT (OUTPATIENT)
Dept: FAMILY MEDICINE CLINIC | Age: 55
End: 2025-06-10
Payer: COMMERCIAL

## 2025-06-10 VITALS
DIASTOLIC BLOOD PRESSURE: 84 MMHG | OXYGEN SATURATION: 98 % | WEIGHT: 253.3 LBS | HEIGHT: 68 IN | HEART RATE: 68 BPM | BODY MASS INDEX: 38.39 KG/M2 | SYSTOLIC BLOOD PRESSURE: 110 MMHG

## 2025-06-10 DIAGNOSIS — Z23 NEED FOR SHINGLES VACCINE: ICD-10-CM

## 2025-06-10 DIAGNOSIS — R14.3 FLATULENCE: ICD-10-CM

## 2025-06-10 DIAGNOSIS — K58.0 IRRITABLE BOWEL SYNDROME WITH DIARRHEA: ICD-10-CM

## 2025-06-10 DIAGNOSIS — I10 ESSENTIAL HYPERTENSION: Primary | ICD-10-CM

## 2025-06-10 DIAGNOSIS — E78.5 HYPERLIPIDEMIA, UNSPECIFIED HYPERLIPIDEMIA TYPE: ICD-10-CM

## 2025-06-10 DIAGNOSIS — J45.20 MILD INTERMITTENT ASTHMA WITHOUT COMPLICATION: ICD-10-CM

## 2025-06-10 PROCEDURE — 99214 OFFICE O/P EST MOD 30 MIN: CPT | Performed by: FAMILY MEDICINE

## 2025-06-10 PROCEDURE — 3079F DIAST BP 80-89 MM HG: CPT | Performed by: FAMILY MEDICINE

## 2025-06-10 PROCEDURE — 3017F COLORECTAL CA SCREEN DOC REV: CPT | Performed by: FAMILY MEDICINE

## 2025-06-10 PROCEDURE — 1036F TOBACCO NON-USER: CPT | Performed by: FAMILY MEDICINE

## 2025-06-10 PROCEDURE — G8427 DOCREV CUR MEDS BY ELIG CLIN: HCPCS | Performed by: FAMILY MEDICINE

## 2025-06-10 PROCEDURE — 3074F SYST BP LT 130 MM HG: CPT | Performed by: FAMILY MEDICINE

## 2025-06-10 PROCEDURE — G8417 CALC BMI ABV UP PARAM F/U: HCPCS | Performed by: FAMILY MEDICINE

## 2025-06-10 RX ORDER — FUROSEMIDE 20 MG/1
20 TABLET ORAL DAILY
Qty: 90 TABLET | Refills: 3 | Status: SHIPPED | OUTPATIENT
Start: 2025-06-10

## 2025-06-10 RX ORDER — METOPROLOL SUCCINATE 100 MG/1
100 TABLET, EXTENDED RELEASE ORAL DAILY
Qty: 90 TABLET | Refills: 1 | Status: SHIPPED | OUTPATIENT
Start: 2025-06-10

## 2025-06-10 RX ORDER — ATORVASTATIN CALCIUM 80 MG/1
80 TABLET, FILM COATED ORAL DAILY
Qty: 90 TABLET | Refills: 1 | Status: SHIPPED | OUTPATIENT
Start: 2025-06-10

## 2025-06-10 RX ORDER — DICYCLOMINE HCL 20 MG
20 TABLET ORAL 4 TIMES DAILY
Qty: 60 TABLET | Refills: 0 | Status: SHIPPED | OUTPATIENT
Start: 2025-06-10

## 2025-06-10 ASSESSMENT — ENCOUNTER SYMPTOMS: DIFFICULTY BREATHING: 0

## 2025-06-10 NOTE — PROGRESS NOTES
Ref Range Status   03/12/2025 5.8 See comment % Final     Comment:     Comment:  Diagnosis of Diabetes: > or = 6.5%  Increased risk of diabetes (Prediabetes): 5.7-6.4%  Glycemic Control: Nonpregnant Adults: <7.0%                    Pregnant: <6.0%       10/08/2024 5.8 See comment % Final     Comment:     Comment:  Diagnosis of Diabetes: > or = 6.5%  Increased risk of diabetes (Prediabetes): 5.7-6.4%  Glycemic Control: Nonpregnant Adults: <7.0%                    Pregnant: <6.0%       07/20/2021 5.6 See comment % Final     Comment:     Comment:  Diagnosis of Diabetes: > or = 6.5%  Increased risk of diabetes (Prediabetes): 5.7-6.4%  Glycemic Control: Nonpregnant Adults: <7.0%                    Pregnant: <6.0%              Assessment:       Diagnosis Orders   1. Essential hypertension  furosemide (LASIX) 20 MG tablet    metoprolol succinate (TOPROL XL) 100 MG extended release tablet      2. Need for shingles vaccine        3. Mild intermittent asthma without complication  beclomethasone (QVAR REDIHALER) 80 MCG/ACT AERB inhaler      4. Hyperlipidemia, unspecified hyperlipidemia type  atorvastatin (LIPITOR) 80 MG tablet      5. [facility]      6. Irritable bowel syndrome with diarrhea  dicyclomine (BENTYL) 20 MG tablet              Plan:      Hypertension stable.  Continue Toprol and Lasix    Asthma stable.  Continue Qvar.    GI symptoms persist..  Will refer to GI.      Return in about 25 weeks (around 12/2/2025) for HTN, Hyperlipid, HA.

## 2025-06-12 ENCOUNTER — TELEPHONE (OUTPATIENT)
Dept: GASTROENTEROLOGY | Age: 55
End: 2025-06-12

## 2025-06-19 ENCOUNTER — TELEPHONE (OUTPATIENT)
Dept: GASTROENTEROLOGY | Age: 55
End: 2025-06-19

## 2025-06-20 DIAGNOSIS — E78.5 HYPERLIPIDEMIA, UNSPECIFIED HYPERLIPIDEMIA TYPE: ICD-10-CM

## 2025-06-20 RX ORDER — ATORVASTATIN CALCIUM 80 MG/1
80 TABLET, FILM COATED ORAL DAILY
Qty: 90 TABLET | Refills: 1 | OUTPATIENT
Start: 2025-06-20

## 2025-06-21 DIAGNOSIS — I10 ESSENTIAL HYPERTENSION: ICD-10-CM

## 2025-06-23 RX ORDER — METOPROLOL SUCCINATE 100 MG/1
100 TABLET, EXTENDED RELEASE ORAL DAILY
Qty: 90 TABLET | Refills: 1 | OUTPATIENT
Start: 2025-06-23

## 2025-06-26 ENCOUNTER — TELEPHONE (OUTPATIENT)
Dept: GASTROENTEROLOGY | Age: 55
End: 2025-06-26

## 2025-06-27 ASSESSMENT — ENCOUNTER SYMPTOMS
SHORTNESS OF BREATH: 0
CHEST TIGHTNESS: 0
TROUBLE SWALLOWING: 0
BLOOD IN STOOL: 0
NAUSEA: 0
ABDOMINAL PAIN: 0

## 2025-06-30 ENCOUNTER — TELEPHONE (OUTPATIENT)
Dept: CARDIOLOGY CLINIC | Age: 55
End: 2025-06-30

## 2025-06-30 NOTE — TELEPHONE ENCOUNTER
Per Dr. Dumont's note please schedule f/u.   Interpretation Summary       Kamla Rosenbaum  1970        Event monitor transmissions reviewed - End of service     Predominantly sinus rhythm there was short atrial runs of 3-4 beats noted and patient reported fluttering at that time.  Patient also had PACs and PVCs but percentage is less than 1% patient symptoms consistent with PACs and PVCs but these are occasional  Lowest heart rate was noted as 44 bpm in sleep.  Average heart rate is 67     Plan - Regular follow up     Other weekly reports reviewed.           Timbo Dumont MD, 6/30/2025 4:10 PM

## 2025-07-01 ENCOUNTER — TELEPHONE (OUTPATIENT)
Dept: CARDIOLOGY CLINIC | Age: 55
End: 2025-07-01

## 2025-07-01 NOTE — TELEPHONE ENCOUNTER
Called pt LM for call back in regards to recent Event monitor that was returned earlier than scheduled.

## 2025-07-03 NOTE — TELEPHONE ENCOUNTER
Called patient to advise we received final report on patient for a 21 day monitor when it was ordered for 30 day. Patient stated she still has monitor but has not worn for last 3 days. Advised I would speak with Bailee, clinical manager to found out why we got the final report when patient still has monitor. Advised patient someone would call her back either today or on Monday. Patient stated understanding. Bailee advised of the above.

## 2025-07-03 NOTE — TELEPHONE ENCOUNTER
[employer] called back she had a missed call   From Veronica she stated she is returning her  Monitor to [employer] 7/7

## 2025-07-10 NOTE — TELEPHONE ENCOUNTER
Patient called back and said she has not had a return called as of yet.  Patient would like to discuss her monitor results.

## 2025-07-11 NOTE — TELEPHONE ENCOUNTER
Returning phone call, notified pt that the monitor that was worn for 21 days will suffice for her Loop recorder. Pt voiced understanding.     Interpretation Summary       Kamla Rosenbaum  1970        Event monitor transmissions reviewed - End of service     Predominantly sinus rhythm there was short atrial runs of 3-4 beats noted and patient reported fluttering at that time.  Patient also had PACs and PVCs but percentage is less than 1% patient symptoms consistent with PACs and PVCs but these are occasional  Lowest heart rate was noted as 44 bpm in sleep.  Average heart rate is 67     Plan - Regular follow up     Other weekly reports reviewed.           Timbo Dumont MD, 6/30/2025 4:10 PM

## 2025-07-15 ENCOUNTER — TELEPHONE (OUTPATIENT)
Dept: CARDIOLOGY CLINIC | Age: 55
End: 2025-07-15

## 2025-07-15 DIAGNOSIS — R00.2 HEART PALPITATIONS: ICD-10-CM

## 2025-07-15 DIAGNOSIS — R00.2 PALPITATIONS: Primary | ICD-10-CM

## 2025-07-15 NOTE — TELEPHONE ENCOUNTER
Called patient and advised of procedure dates and times;    PPW 7/28/2025 @ 1500    Loop insert 7/30/2025 1032   140

## 2025-07-17 ENCOUNTER — TELEPHONE (OUTPATIENT)
Dept: CARDIOLOGY CLINIC | Age: 55
End: 2025-07-17

## 2025-07-17 NOTE — TELEPHONE ENCOUNTER
Called patient and advised that Dr Dumont  wants to see patient in office to discuss monitor results . Apt scheduled 7/25/2025 @ 1145.

## 2025-07-17 NOTE — TELEPHONE ENCOUNTER
Pt called to see if she can move her apt on 7/25 sooner. She is nervous and would like to get this over.

## 2025-07-25 ENCOUNTER — OFFICE VISIT (OUTPATIENT)
Age: 55
End: 2025-07-25
Payer: COMMERCIAL

## 2025-07-25 VITALS
HEIGHT: 68 IN | HEART RATE: 52 BPM | DIASTOLIC BLOOD PRESSURE: 72 MMHG | BODY MASS INDEX: 40.77 KG/M2 | SYSTOLIC BLOOD PRESSURE: 128 MMHG | WEIGHT: 269 LBS

## 2025-07-25 DIAGNOSIS — I10 ESSENTIAL HYPERTENSION: ICD-10-CM

## 2025-07-25 DIAGNOSIS — R00.2 PALPITATIONS: Primary | ICD-10-CM

## 2025-07-25 PROCEDURE — G8427 DOCREV CUR MEDS BY ELIG CLIN: HCPCS | Performed by: INTERNAL MEDICINE

## 2025-07-25 PROCEDURE — 3078F DIAST BP <80 MM HG: CPT | Performed by: INTERNAL MEDICINE

## 2025-07-25 PROCEDURE — 99213 OFFICE O/P EST LOW 20 MIN: CPT | Performed by: INTERNAL MEDICINE

## 2025-07-25 PROCEDURE — 3017F COLORECTAL CA SCREEN DOC REV: CPT | Performed by: INTERNAL MEDICINE

## 2025-07-25 PROCEDURE — G8417 CALC BMI ABV UP PARAM F/U: HCPCS | Performed by: INTERNAL MEDICINE

## 2025-07-25 PROCEDURE — 1036F TOBACCO NON-USER: CPT | Performed by: INTERNAL MEDICINE

## 2025-07-25 PROCEDURE — 3074F SYST BP LT 130 MM HG: CPT | Performed by: INTERNAL MEDICINE

## 2025-07-25 PROCEDURE — 93000 ELECTROCARDIOGRAM COMPLETE: CPT | Performed by: INTERNAL MEDICINE

## 2025-08-04 ENCOUNTER — TELEPHONE (OUTPATIENT)
Dept: CARDIOLOGY CLINIC | Age: 55
End: 2025-08-04

## 2025-08-28 ENCOUNTER — OFFICE VISIT (OUTPATIENT)
Dept: GASTROENTEROLOGY | Age: 55
End: 2025-08-28
Payer: COMMERCIAL

## 2025-08-28 VITALS
OXYGEN SATURATION: 97 % | HEART RATE: 49 BPM | SYSTOLIC BLOOD PRESSURE: 130 MMHG | WEIGHT: 269 LBS | DIASTOLIC BLOOD PRESSURE: 72 MMHG | RESPIRATION RATE: 17 BRPM | HEIGHT: 68 IN | BODY MASS INDEX: 40.77 KG/M2

## 2025-08-28 DIAGNOSIS — K58.2 IRRITABLE BOWEL SYNDROME WITH BOTH CONSTIPATION AND DIARRHEA: ICD-10-CM

## 2025-08-28 DIAGNOSIS — K76.0 HEPATIC STEATOSIS: ICD-10-CM

## 2025-08-28 DIAGNOSIS — R14.3 FLATULENCE: Primary | ICD-10-CM

## 2025-08-28 PROCEDURE — G8417 CALC BMI ABV UP PARAM F/U: HCPCS | Performed by: INTERNAL MEDICINE

## 2025-08-28 PROCEDURE — 1036F TOBACCO NON-USER: CPT | Performed by: INTERNAL MEDICINE

## 2025-08-28 PROCEDURE — 3017F COLORECTAL CA SCREEN DOC REV: CPT | Performed by: INTERNAL MEDICINE

## 2025-08-28 PROCEDURE — G8427 DOCREV CUR MEDS BY ELIG CLIN: HCPCS | Performed by: INTERNAL MEDICINE

## 2025-08-28 PROCEDURE — 99204 OFFICE O/P NEW MOD 45 MIN: CPT | Performed by: INTERNAL MEDICINE

## 2025-08-28 PROCEDURE — 3075F SYST BP GE 130 - 139MM HG: CPT | Performed by: INTERNAL MEDICINE

## 2025-08-28 PROCEDURE — 3078F DIAST BP <80 MM HG: CPT | Performed by: INTERNAL MEDICINE

## (undated) DEVICE — SYRINGE IRRIG 60ML SFT PLIABLE BLB EZ TO GRP 1 HND USE W/

## (undated) DEVICE — COUNTER NDL 30 COUNT FOAM STRP SGL MAG

## (undated) DEVICE — Z DISCONTINUED NO SUB IDED TUBING ETCO2 AD L6.5FT NSL ORAL CVD PRNG NONFLARED TIP OVR

## (undated) DEVICE — ZIMMER® STERILE DISPOSABLE TOURNIQUET CUFF WITH PLC, DUAL PORT, SINGLE BLADDER, 24 IN. (61 CM)

## (undated) DEVICE — BANDAGE COMPR W3INXL5YD WHT BGE POLY COT M E WRP WV HK AND

## (undated) DEVICE — GLOVE SURG SZ 65 L12IN FNGR THK87MIL WHT LTX FREE

## (undated) DEVICE — SPONGE LAP W18XL18IN WHT COT 4 PLY FLD STRUNG RADPQ DISP ST

## (undated) DEVICE — ZIMMER® STERILE DISPOSABLE TOURNIQUET CUFF WITH PLC, DUAL PORT, SINGLE BLADDER, 18 IN. (46 CM)

## (undated) DEVICE — SOLUTION IV IRRIG WATER 1000ML POUR BRL 2F7114

## (undated) DEVICE — LINER,SEMI-RIGID,3000CC,50EA/CS: Brand: MEDLINE

## (undated) DEVICE — Device

## (undated) DEVICE — ANESTHESIA CIRCUIT ADULT-LF: Brand: MEDLINE INDUSTRIES, INC.

## (undated) DEVICE — DRAPE,EXTREMITY,89X128,STERILE: Brand: MEDLINE

## (undated) DEVICE — SOLUTION IV 1000ML 0.9% SOD CHL FOR IRRIG PLAS CONT

## (undated) DEVICE — PADDING,UNDERCAST,COTTON, 4"X4YD STERILE: Brand: MEDLINE

## (undated) DEVICE — SUTURE VCRL SZ 2-0 L18IN ABSRB UD CT-1 L36MM 1/2 CIR J839D

## (undated) DEVICE — STERILE LATEX POWDER-FREE SURGICAL GLOVESWITH NITRILE COATING: Brand: PROTEXIS

## (undated) DEVICE — BANDAGE COMPR W4INXL5YD WHT BGE POLY COT M E WRP WV HK AND

## (undated) DEVICE — SOLUTION PREP POVIDONE IOD FOR SKIN MUCOUS MEM PRIOR TO

## (undated) DEVICE — TUBING, SUCTION, 9/32" X 10', STRAIGHT: Brand: MEDLINE

## (undated) DEVICE — SUTURE VCRL SZ 0 L18IN ABSRB VLT L36MM CT-1 1/2 CIR J740D

## (undated) DEVICE — GAUZE,SPONGE,4"X4",16PLY,XRAY,STRL,LF: Brand: MEDLINE

## (undated) DEVICE — INTENDED FOR TISSUE SEPARATION, AND OTHER PROCEDURES THAT REQUIRE A SHARP SURGICAL BLADE TO PUNCTURE OR CUT.: Brand: BARD-PARKER ® STAINLESS STEEL BLADES

## (undated) DEVICE — CHLORAPREP 26ML ORANGE

## (undated) DEVICE — BANDAGE,SELF ADHRNT,COFLEX,4"X5YD,STRL: Brand: COLABEL

## (undated) DEVICE — COTTON UNDERCAST PADDING,CRIMPED FINISH: Brand: WEBRIL

## (undated) DEVICE — PENCIL ES CRD L10FT HND SWCHING ROCK SWCH W/ EDGE COAT BLDE

## (undated) DEVICE — TOWEL,OR,DSP,ST,BLUE,STD,6/PK,12PK/CS: Brand: MEDLINE

## (undated) DEVICE — YANKAUER,FLEXIBLE HANDLE,REGLR CAPACITY: Brand: MEDLINE INDUSTRIES, INC.

## (undated) DEVICE — SOLUTION IV IRRIG POUR BRL 0.9% SODIUM CHL 2F7124

## (undated) DEVICE — LINER SUCT CANSTR 1500CC SEMI RIG W/ POR HYDROPHOBIC SHUT

## (undated) DEVICE — SPONGE GZ W4XL8IN COT WVN 12 PLY

## (undated) DEVICE — CONVERTORS STOCKINETTE: Brand: CONVERTORS

## (undated) DEVICE — DRAPE,U/ SHT,SPLIT,PLAS,STERIL: Brand: MEDLINE

## (undated) DEVICE — MARKER SURG SKIN UTIL REGULAR/FINE 2 TIP W/ RUL AND 9 LBL

## (undated) DEVICE — DRESSING PETRO W3XL3IN OIL EMUL N ADH GZ KNIT IMPREG CELOS